# Patient Record
Sex: FEMALE | Race: WHITE | Employment: OTHER | URBAN - METROPOLITAN AREA
[De-identification: names, ages, dates, MRNs, and addresses within clinical notes are randomized per-mention and may not be internally consistent; named-entity substitution may affect disease eponyms.]

---

## 2017-01-11 ENCOUNTER — HOSPITAL ENCOUNTER (OUTPATIENT)
Dept: CARDIOLOGY | Age: 72
Discharge: HOME | End: 2017-01-11
Payer: MEDICARE

## 2017-01-11 PROCEDURE — 93299 HC INTERROG REMOTE LOOP RECORDER: CPT

## 2017-03-09 ENCOUNTER — HOSPITAL ENCOUNTER (EMERGENCY)
Age: 72
Discharge: HOME | End: 2017-03-09
Payer: MEDICARE

## 2017-03-09 VITALS
HEART RATE: 64 BPM | DIASTOLIC BLOOD PRESSURE: 89 MMHG | TEMPERATURE: 98.2 F | OXYGEN SATURATION: 96 % | SYSTOLIC BLOOD PRESSURE: 175 MMHG | RESPIRATION RATE: 18 BRPM

## 2017-03-09 DIAGNOSIS — T78.3XXA ANGIOEDEMA, INITIAL ENCOUNTER: ICD-10-CM

## 2017-03-09 DIAGNOSIS — T78.40XA ALLERGIC REACTION, INITIAL ENCOUNTER: Primary | ICD-10-CM

## 2017-03-09 PROCEDURE — 96375 TX/PRO/DX INJ NEW DRUG ADDON: CPT

## 2017-03-09 PROCEDURE — 96374 THER/PROPH/DIAG INJ IV PUSH: CPT

## 2017-03-09 PROCEDURE — 6360000002 HC RX W HCPCS: Performed by: PHYSICIAN ASSISTANT

## 2017-03-09 PROCEDURE — 96376 TX/PRO/DX INJ SAME DRUG ADON: CPT

## 2017-03-09 PROCEDURE — 99282 EMERGENCY DEPT VISIT SF MDM: CPT

## 2017-03-09 PROCEDURE — 2500000003 HC RX 250 WO HCPCS: Performed by: PHYSICIAN ASSISTANT

## 2017-03-09 PROCEDURE — S0028 INJECTION, FAMOTIDINE, 20 MG: HCPCS | Performed by: PHYSICIAN ASSISTANT

## 2017-03-09 RX ORDER — PREDNISONE 20 MG/1
50 TABLET ORAL DAILY
Qty: 5 TABLET | Refills: 0 | Status: SHIPPED | OUTPATIENT
Start: 2017-03-09 | End: 2017-03-14

## 2017-03-09 RX ORDER — DIPHENHYDRAMINE HYDROCHLORIDE 50 MG/ML
25 INJECTION INTRAMUSCULAR; INTRAVENOUS ONCE
Status: COMPLETED | OUTPATIENT
Start: 2017-03-09 | End: 2017-03-09

## 2017-03-09 RX ORDER — METHYLPREDNISOLONE SODIUM SUCCINATE 125 MG/2ML
125 INJECTION, POWDER, LYOPHILIZED, FOR SOLUTION INTRAMUSCULAR; INTRAVENOUS ONCE
Status: COMPLETED | OUTPATIENT
Start: 2017-03-09 | End: 2017-03-09

## 2017-03-09 RX ORDER — LORATADINE 10 MG/1
10 TABLET ORAL DAILY
Qty: 5 TABLET | Refills: 0 | Status: SHIPPED | OUTPATIENT
Start: 2017-03-09 | End: 2017-03-14

## 2017-03-09 RX ADMIN — FAMOTIDINE 20 MG: 10 INJECTION, SOLUTION INTRAVENOUS at 10:21

## 2017-03-09 RX ADMIN — METHYLPREDNISOLONE SODIUM SUCCINATE 125 MG: 125 INJECTION, POWDER, FOR SOLUTION INTRAMUSCULAR; INTRAVENOUS at 11:46

## 2017-03-09 RX ADMIN — DIPHENHYDRAMINE HYDROCHLORIDE 25 MG: 50 INJECTION, SOLUTION INTRAMUSCULAR; INTRAVENOUS at 11:49

## 2017-03-09 RX ADMIN — DIPHENHYDRAMINE HYDROCHLORIDE 25 MG: 50 INJECTION, SOLUTION INTRAMUSCULAR; INTRAVENOUS at 10:22

## 2017-03-09 RX ADMIN — METHYLPREDNISOLONE SODIUM SUCCINATE 125 MG: 125 INJECTION, POWDER, FOR SOLUTION INTRAMUSCULAR; INTRAVENOUS at 10:21

## 2017-03-09 ASSESSMENT — ENCOUNTER SYMPTOMS
DIARRHEA: 0
WHEEZING: 0
CHEST TIGHTNESS: 0
STRIDOR: 0
ABDOMINAL DISTENTION: 0
RHINORRHEA: 0
SHORTNESS OF BREATH: 0
COUGH: 0
NAUSEA: 0
BACK PAIN: 0
SORE THROAT: 0
CONSTIPATION: 0
COLOR CHANGE: 0
EYE DISCHARGE: 0
ABDOMINAL PAIN: 0
CHOKING: 0
VOMITING: 0

## 2017-03-27 ENCOUNTER — HOSPITAL ENCOUNTER (EMERGENCY)
Age: 72
Discharge: HOME | End: 2017-03-28
Attending: EMERGENCY MEDICINE
Payer: MEDICARE

## 2017-03-27 DIAGNOSIS — T78.3XXD ANGIOEDEMA, SUBSEQUENT ENCOUNTER: Primary | ICD-10-CM

## 2017-03-27 PROCEDURE — 6360000002 HC RX W HCPCS: Performed by: EMERGENCY MEDICINE

## 2017-03-27 PROCEDURE — 99282 EMERGENCY DEPT VISIT SF MDM: CPT

## 2017-03-27 PROCEDURE — S0028 INJECTION, FAMOTIDINE, 20 MG: HCPCS | Performed by: EMERGENCY MEDICINE

## 2017-03-27 PROCEDURE — 2500000003 HC RX 250 WO HCPCS: Performed by: EMERGENCY MEDICINE

## 2017-03-27 RX ORDER — DIPHENHYDRAMINE HYDROCHLORIDE 50 MG/ML
25 INJECTION INTRAMUSCULAR; INTRAVENOUS ONCE
Status: COMPLETED | OUTPATIENT
Start: 2017-03-27 | End: 2017-03-27

## 2017-03-27 RX ORDER — METHYLPREDNISOLONE SODIUM SUCCINATE 125 MG/2ML
125 INJECTION, POWDER, LYOPHILIZED, FOR SOLUTION INTRAMUSCULAR; INTRAVENOUS ONCE
Status: COMPLETED | OUTPATIENT
Start: 2017-03-27 | End: 2017-03-27

## 2017-03-27 RX ADMIN — DIPHENHYDRAMINE HYDROCHLORIDE 25 MG: 50 INJECTION, SOLUTION INTRAMUSCULAR; INTRAVENOUS at 22:47

## 2017-03-27 RX ADMIN — FAMOTIDINE 20 MG: 10 INJECTION INTRAVENOUS at 22:47

## 2017-03-27 RX ADMIN — METHYLPREDNISOLONE SODIUM SUCCINATE 125 MG: 125 INJECTION, POWDER, FOR SOLUTION INTRAMUSCULAR; INTRAVENOUS at 22:47

## 2017-03-27 ASSESSMENT — ENCOUNTER SYMPTOMS
EYE DISCHARGE: 0
RHINORRHEA: 0
COUGH: 0
PHOTOPHOBIA: 0
CHEST TIGHTNESS: 0
WHEEZING: 0
SHORTNESS OF BREATH: 0
TROUBLE SWALLOWING: 1
ABDOMINAL DISTENTION: 0
ABDOMINAL PAIN: 0
VOMITING: 0
SORE THROAT: 0

## 2017-03-28 ENCOUNTER — OFFICE VISIT (OUTPATIENT)
Dept: FAMILY MEDICINE CLINIC | Age: 72
End: 2017-03-28

## 2017-03-28 VITALS
HEIGHT: 65 IN | DIASTOLIC BLOOD PRESSURE: 84 MMHG | RESPIRATION RATE: 23 BRPM | OXYGEN SATURATION: 97 % | WEIGHT: 222.2 LBS | HEART RATE: 78 BPM | SYSTOLIC BLOOD PRESSURE: 144 MMHG | BODY MASS INDEX: 37.02 KG/M2 | TEMPERATURE: 98.2 F

## 2017-03-28 VITALS
TEMPERATURE: 98.6 F | OXYGEN SATURATION: 99 % | SYSTOLIC BLOOD PRESSURE: 149 MMHG | DIASTOLIC BLOOD PRESSURE: 71 MMHG | RESPIRATION RATE: 16 BRPM | HEART RATE: 78 BPM

## 2017-03-28 DIAGNOSIS — T78.3XXA ANGIOEDEMA, INITIAL ENCOUNTER: Primary | ICD-10-CM

## 2017-03-28 DIAGNOSIS — L50.9 URTICARIA: ICD-10-CM

## 2017-03-28 PROCEDURE — 96374 THER/PROPH/DIAG INJ IV PUSH: CPT

## 2017-03-28 PROCEDURE — 3014F SCREEN MAMMO DOC REV: CPT | Performed by: FAMILY MEDICINE

## 2017-03-28 PROCEDURE — G8427 DOCREV CUR MEDS BY ELIG CLIN: HCPCS | Performed by: FAMILY MEDICINE

## 2017-03-28 PROCEDURE — 4040F PNEUMOC VAC/ADMIN/RCVD: CPT | Performed by: FAMILY MEDICINE

## 2017-03-28 PROCEDURE — 1090F PRES/ABSN URINE INCON ASSESS: CPT | Performed by: FAMILY MEDICINE

## 2017-03-28 PROCEDURE — 1036F TOBACCO NON-USER: CPT | Performed by: FAMILY MEDICINE

## 2017-03-28 PROCEDURE — 3017F COLORECTAL CA SCREEN DOC REV: CPT | Performed by: FAMILY MEDICINE

## 2017-03-28 PROCEDURE — 96375 TX/PRO/DX INJ NEW DRUG ADDON: CPT

## 2017-03-28 PROCEDURE — G8417 CALC BMI ABV UP PARAM F/U: HCPCS | Performed by: FAMILY MEDICINE

## 2017-03-28 PROCEDURE — G8399 PT W/DXA RESULTS DOCUMENT: HCPCS | Performed by: FAMILY MEDICINE

## 2017-03-28 PROCEDURE — G8484 FLU IMMUNIZE NO ADMIN: HCPCS | Performed by: FAMILY MEDICINE

## 2017-03-28 PROCEDURE — 99213 OFFICE O/P EST LOW 20 MIN: CPT | Performed by: FAMILY MEDICINE

## 2017-03-28 PROCEDURE — 1123F ACP DISCUSS/DSCN MKR DOCD: CPT | Performed by: FAMILY MEDICINE

## 2017-03-28 RX ORDER — METHYLPREDNISOLONE 4 MG/1
4 TABLET ORAL SEE ADMIN INSTRUCTIONS
COMMUNITY
End: 2017-06-01

## 2017-03-28 RX ORDER — EPINEPHRINE 0.3 MG/.3ML
0.3 INJECTION SUBCUTANEOUS ONCE
Qty: 0.3 ML | Refills: 0 | Status: SHIPPED | OUTPATIENT
Start: 2017-03-28 | End: 2018-04-27 | Stop reason: SDUPTHER

## 2017-03-28 RX ORDER — METHYLPREDNISOLONE 4 MG/1
TABLET ORAL
Qty: 1 KIT | Refills: 0 | Status: SHIPPED | OUTPATIENT
Start: 2017-03-28 | End: 2017-03-28

## 2017-03-28 ASSESSMENT — ENCOUNTER SYMPTOMS
ABDOMINAL PAIN: 0
SHORTNESS OF BREATH: 0
NAUSEA: 0
VOMITING: 0

## 2017-04-13 ENCOUNTER — HOSPITAL ENCOUNTER (OUTPATIENT)
Dept: CARDIOLOGY | Age: 72
Discharge: HOME | End: 2017-04-13
Payer: MEDICARE

## 2017-04-13 PROCEDURE — 93299 HC INTERROG REMOTE LOOP RECORDER: CPT

## 2017-04-24 RX ORDER — DOXAZOSIN MESYLATE 4 MG/1
TABLET ORAL
Qty: 90 TABLET | Refills: 0 | Status: SHIPPED | OUTPATIENT
Start: 2017-04-24 | End: 2017-07-27 | Stop reason: SDUPTHER

## 2017-05-01 RX ORDER — SERTRALINE HYDROCHLORIDE 100 MG/1
TABLET, FILM COATED ORAL
Qty: 90 TABLET | Refills: 0 | Status: SHIPPED | OUTPATIENT
Start: 2017-05-01 | End: 2017-07-07 | Stop reason: SDUPTHER

## 2017-06-01 ENCOUNTER — OFFICE VISIT (OUTPATIENT)
Dept: FAMILY MEDICINE CLINIC | Age: 72
End: 2017-06-01

## 2017-06-01 VITALS
SYSTOLIC BLOOD PRESSURE: 138 MMHG | DIASTOLIC BLOOD PRESSURE: 84 MMHG | HEIGHT: 65 IN | OXYGEN SATURATION: 96 % | RESPIRATION RATE: 16 BRPM | HEART RATE: 74 BPM | TEMPERATURE: 98.9 F | BODY MASS INDEX: 36.99 KG/M2 | WEIGHT: 222 LBS

## 2017-06-01 DIAGNOSIS — M46.1 SACROILIAC INFLAMMATION (HCC): ICD-10-CM

## 2017-06-01 DIAGNOSIS — I10 ESSENTIAL HYPERTENSION: ICD-10-CM

## 2017-06-01 DIAGNOSIS — J45.41 RAD (REACTIVE AIRWAY DISEASE), MODERATE PERSISTENT, WITH ACUTE EXACERBATION: Primary | ICD-10-CM

## 2017-06-01 PROCEDURE — G8399 PT W/DXA RESULTS DOCUMENT: HCPCS | Performed by: FAMILY MEDICINE

## 2017-06-01 PROCEDURE — 3014F SCREEN MAMMO DOC REV: CPT | Performed by: FAMILY MEDICINE

## 2017-06-01 PROCEDURE — 1090F PRES/ABSN URINE INCON ASSESS: CPT | Performed by: FAMILY MEDICINE

## 2017-06-01 PROCEDURE — 1123F ACP DISCUSS/DSCN MKR DOCD: CPT | Performed by: FAMILY MEDICINE

## 2017-06-01 PROCEDURE — 4040F PNEUMOC VAC/ADMIN/RCVD: CPT | Performed by: FAMILY MEDICINE

## 2017-06-01 PROCEDURE — G8417 CALC BMI ABV UP PARAM F/U: HCPCS | Performed by: FAMILY MEDICINE

## 2017-06-01 PROCEDURE — G8427 DOCREV CUR MEDS BY ELIG CLIN: HCPCS | Performed by: FAMILY MEDICINE

## 2017-06-01 PROCEDURE — 99213 OFFICE O/P EST LOW 20 MIN: CPT | Performed by: FAMILY MEDICINE

## 2017-06-01 PROCEDURE — 1036F TOBACCO NON-USER: CPT | Performed by: FAMILY MEDICINE

## 2017-06-01 PROCEDURE — 3017F COLORECTAL CA SCREEN DOC REV: CPT | Performed by: FAMILY MEDICINE

## 2017-06-01 RX ORDER — CEFUROXIME AXETIL 250 MG/1
250 TABLET ORAL 2 TIMES DAILY
Qty: 20 TABLET | Refills: 0 | Status: SHIPPED | OUTPATIENT
Start: 2017-06-01 | End: 2017-06-11

## 2017-06-01 RX ORDER — PREDNISONE 10 MG/1
TABLET ORAL
Qty: 30 TABLET | Refills: 0 | Status: SHIPPED | OUTPATIENT
Start: 2017-06-01 | End: 2017-12-12 | Stop reason: ALTCHOICE

## 2017-07-07 RX ORDER — SERTRALINE HYDROCHLORIDE 100 MG/1
TABLET, FILM COATED ORAL
Qty: 90 TABLET | Refills: 0 | Status: SHIPPED | OUTPATIENT
Start: 2017-07-07 | End: 2017-07-27 | Stop reason: SDUPTHER

## 2017-07-24 ENCOUNTER — HOSPITAL ENCOUNTER (OUTPATIENT)
Dept: CARDIOLOGY | Age: 72
Discharge: HOME | End: 2017-07-24
Payer: MEDICARE

## 2017-07-24 PROCEDURE — 93299 HC INTERROG REMOTE LOOP RECORDER: CPT

## 2017-07-28 RX ORDER — DOXAZOSIN MESYLATE 4 MG/1
TABLET ORAL
Qty: 90 TABLET | Refills: 0 | Status: SHIPPED | OUTPATIENT
Start: 2017-07-28 | End: 2017-11-16 | Stop reason: SDUPTHER

## 2017-07-28 RX ORDER — SERTRALINE HYDROCHLORIDE 100 MG/1
TABLET, FILM COATED ORAL
Qty: 90 TABLET | Refills: 0 | Status: SHIPPED | OUTPATIENT
Start: 2017-07-28 | End: 2017-12-12 | Stop reason: SDUPTHER

## 2017-07-28 RX ORDER — CARVEDILOL 25 MG/1
TABLET ORAL
Qty: 180 TABLET | Refills: 1 | Status: SHIPPED | OUTPATIENT
Start: 2017-07-28 | End: 2017-11-16 | Stop reason: SDUPTHER

## 2017-10-24 ENCOUNTER — HOSPITAL ENCOUNTER (OUTPATIENT)
Dept: CARDIOLOGY | Age: 72
Discharge: HOME | End: 2017-10-24
Payer: MEDICARE

## 2017-10-24 PROCEDURE — 93299 HC INTERROG REMOTE LOOP RECORDER: CPT

## 2017-11-16 RX ORDER — DOXAZOSIN MESYLATE 4 MG/1
TABLET ORAL
Qty: 15 TABLET | Refills: 0 | Status: SHIPPED | OUTPATIENT
Start: 2017-11-16 | End: 2017-12-12 | Stop reason: SDUPTHER

## 2017-11-16 RX ORDER — CARVEDILOL 25 MG/1
TABLET ORAL
Qty: 30 TABLET | Refills: 0 | Status: SHIPPED | OUTPATIENT
Start: 2017-11-16 | End: 2017-12-12 | Stop reason: SDUPTHER

## 2017-12-04 ENCOUNTER — CARE COORDINATION (OUTPATIENT)
Dept: CARE COORDINATION | Age: 72
End: 2017-12-04

## 2017-12-04 NOTE — LETTER
12/4/2017              Chris Butts,[address]      [facility] have been selected by your primary care provider to participate in a Care Coordination Program that provides additional support and resources to provide a higher level of care to our patients. One of those resources is a Nurse Care Coordinator, Andrew Tapia who can offer support in managing the health of our patients who have chronic health conditions, multiple health conditions, and or complex health needs. Examples of the types of support Andrew Tapia RN will provide include service coordination (finding providers in your network and community, assistance in scheduling services, ensuring test results are available, etc), education, and promoting your ability to follow your doctor's recommended treatment plan. You have been selected to take part in this unique program that will include one on one interaction with Andrew Tapia RN. Andrew Tapia RN will work with you following some of your appointments with me in our office. She/He will also contact you via phone to help you learn and understand how to manage your health and work towards your chosen health goals. I hope that you will be as excited by this program as we are. Andrew Tapia RN will be contacting you in the next week to speak with you regarding your plan of care.     Sincerely,     Renaee Collet, MD

## 2017-12-04 NOTE — CARE COORDINATION
Ambulatory Care Coordination Note  12/4/2017  CM Risk Score: 5  Beck Mortality Risk Score: 5.58    ACC: Guerline Astudillo, MELE    Summary Note: Mailed out [facility] intro letter to follow up on previous MyChart contact. Prior to Admission medications    Medication Sig Start Date End Date Taking? Authorizing Provider   carvedilol (COREG) 25 MG tablet Take 1 tablet by mouth two  times daily with meals 11/16/17   Davidson Dozier MD   doxazosin (CARDURA) 4 MG tablet Take 1 tablet by mouth  nightly 11/16/17   Davidson Dozier MD   HYDROcodone-acetaminophen (NORCO) 5-325 MG per tablet Take 1 tablet by mouth 2 times daily as needed for Pain . 9/5/17 9/20/17  Adrienne Garcia MD   tiZANidine (ZANAFLEX) 4 MG tablet Take 1 tablet by mouth 2 times daily 9/5/17   Adrienne Garcia MD   sertraline (ZOLOFT) 100 MG tablet Take 1 tablet by mouth  daily 7/28/17   Davidson Dozier MD   ADVAIR DISKUS 500-50 MCG/DOSE diskus inhaler Use 1 puff every 12 hours 7/3/17   Davidson Dozier MD   predniSONE (DELTASONE) 10 MG tablet 40mg daily x 4d, 30mg daily x 3d, 20mg x 2d, 10mg x 1d, then d/c 6/1/17   Davidson Dozier MD   lidocaine (XYLOCAINE) 5 % ointment Apply topically as needed.  5/1/17   Adrienne Garcia MD   EPINEPHrine (EPIPEN 2-DE) 0.3 MG/0.3ML SOAJ injection Inject 0.3 mLs into the muscle once for 1 dose Use as directed for allergic reaction 3/28/17 3/28/17  Brenda Rondon MD   Multiple Vitamins-Minerals (MULTIVITAMIN PO) Take by mouth    Historical Provider, MD   Respiratory Therapy Supplies (NEBULIZER AIR TUBE/PLUGS) MISC As directed 3/7/16   Davidson Dozier MD   albuterol sulfate HFA (PROAIR HFA) 108 (90 BASE) MCG/ACT inhaler Inhale 2 puffs into the lungs every 6 hours as needed for Wheezing 1/25/16   Davidson Dozier MD   aspirin 325 MG EC tablet Take 325 mg by mouth every 6 hours as needed for Pain    Historical Provider, MD       Future Appointments  Date Time Provider [place]   12/12/2017 2:00

## 2017-12-12 ENCOUNTER — OFFICE VISIT (OUTPATIENT)
Dept: FAMILY MEDICINE CLINIC | Age: 72
End: 2017-12-12

## 2017-12-12 VITALS
WEIGHT: 218.9 LBS | OXYGEN SATURATION: 96 % | SYSTOLIC BLOOD PRESSURE: 136 MMHG | RESPIRATION RATE: 16 BRPM | DIASTOLIC BLOOD PRESSURE: 82 MMHG | BODY MASS INDEX: 36.47 KG/M2 | TEMPERATURE: 96.9 F | HEART RATE: 65 BPM | HEIGHT: 65 IN

## 2017-12-12 DIAGNOSIS — F32.A ANXIETY AND DEPRESSION: ICD-10-CM

## 2017-12-12 DIAGNOSIS — E78.00 PURE HYPERCHOLESTEROLEMIA: ICD-10-CM

## 2017-12-12 DIAGNOSIS — M17.11 PRIMARY OSTEOARTHRITIS OF RIGHT KNEE: Primary | ICD-10-CM

## 2017-12-12 DIAGNOSIS — I10 ESSENTIAL HYPERTENSION: ICD-10-CM

## 2017-12-12 DIAGNOSIS — F41.9 ANXIETY AND DEPRESSION: ICD-10-CM

## 2017-12-12 PROCEDURE — 99214 OFFICE O/P EST MOD 30 MIN: CPT | Performed by: FAMILY MEDICINE

## 2017-12-12 PROCEDURE — G8399 PT W/DXA RESULTS DOCUMENT: HCPCS | Performed by: FAMILY MEDICINE

## 2017-12-12 PROCEDURE — 1090F PRES/ABSN URINE INCON ASSESS: CPT | Performed by: FAMILY MEDICINE

## 2017-12-12 PROCEDURE — G8427 DOCREV CUR MEDS BY ELIG CLIN: HCPCS | Performed by: FAMILY MEDICINE

## 2017-12-12 PROCEDURE — 3014F SCREEN MAMMO DOC REV: CPT | Performed by: FAMILY MEDICINE

## 2017-12-12 PROCEDURE — G8484 FLU IMMUNIZE NO ADMIN: HCPCS | Performed by: FAMILY MEDICINE

## 2017-12-12 PROCEDURE — 1123F ACP DISCUSS/DSCN MKR DOCD: CPT | Performed by: FAMILY MEDICINE

## 2017-12-12 PROCEDURE — 4040F PNEUMOC VAC/ADMIN/RCVD: CPT | Performed by: FAMILY MEDICINE

## 2017-12-12 PROCEDURE — 1036F TOBACCO NON-USER: CPT | Performed by: FAMILY MEDICINE

## 2017-12-12 PROCEDURE — G8417 CALC BMI ABV UP PARAM F/U: HCPCS | Performed by: FAMILY MEDICINE

## 2017-12-12 PROCEDURE — 3017F COLORECTAL CA SCREEN DOC REV: CPT | Performed by: FAMILY MEDICINE

## 2017-12-12 RX ORDER — CARVEDILOL 25 MG/1
TABLET ORAL
Qty: 90 TABLET | Refills: 1 | Status: SHIPPED | OUTPATIENT
Start: 2017-12-12 | End: 2018-03-20 | Stop reason: SDUPTHER

## 2017-12-12 RX ORDER — DOXAZOSIN MESYLATE 4 MG/1
TABLET ORAL
Qty: 90 TABLET | Refills: 1 | Status: SHIPPED | OUTPATIENT
Start: 2017-12-12 | End: 2018-05-09 | Stop reason: SDUPTHER

## 2017-12-12 RX ORDER — SERTRALINE HYDROCHLORIDE 100 MG/1
TABLET, FILM COATED ORAL
Qty: 90 TABLET | Refills: 1 | Status: SHIPPED | OUTPATIENT
Start: 2017-12-12 | End: 2018-05-09 | Stop reason: SDUPTHER

## 2017-12-12 RX ORDER — SERTRALINE HYDROCHLORIDE 100 MG/1
TABLET, FILM COATED ORAL
Qty: 90 TABLET | Refills: 0 | Status: SHIPPED | OUTPATIENT
Start: 2017-12-12 | End: 2017-12-12 | Stop reason: SDUPTHER

## 2017-12-12 RX ORDER — CELECOXIB 200 MG/1
200 CAPSULE ORAL DAILY
Qty: 30 CAPSULE | Refills: 0 | Status: SHIPPED | OUTPATIENT
Start: 2017-12-12 | End: 2018-01-05

## 2017-12-12 ASSESSMENT — ENCOUNTER SYMPTOMS: ABDOMINAL PAIN: 0

## 2017-12-13 ENCOUNTER — CARE COORDINATION (OUTPATIENT)
Dept: CARE COORDINATION | Age: 72
End: 2017-12-13

## 2017-12-20 ENCOUNTER — OFFICE VISIT (OUTPATIENT)
Dept: FAMILY MEDICINE CLINIC | Age: 72
End: 2017-12-20

## 2017-12-20 VITALS
HEIGHT: 65 IN | OXYGEN SATURATION: 97 % | RESPIRATION RATE: 18 BRPM | DIASTOLIC BLOOD PRESSURE: 82 MMHG | BODY MASS INDEX: 36.65 KG/M2 | WEIGHT: 220 LBS | HEART RATE: 74 BPM | SYSTOLIC BLOOD PRESSURE: 132 MMHG | TEMPERATURE: 98.2 F

## 2017-12-20 DIAGNOSIS — J21.9 ACUTE BRONCHIOLITIS DUE TO UNSPECIFIED ORGANISM: Primary | ICD-10-CM

## 2017-12-20 DIAGNOSIS — J45.41 MODERATE PERSISTENT REACTIVE AIRWAY DISEASE WITH ACUTE EXACERBATION: ICD-10-CM

## 2017-12-20 PROCEDURE — 1090F PRES/ABSN URINE INCON ASSESS: CPT | Performed by: FAMILY MEDICINE

## 2017-12-20 PROCEDURE — 4040F PNEUMOC VAC/ADMIN/RCVD: CPT | Performed by: FAMILY MEDICINE

## 2017-12-20 PROCEDURE — G8417 CALC BMI ABV UP PARAM F/U: HCPCS | Performed by: FAMILY MEDICINE

## 2017-12-20 PROCEDURE — 1036F TOBACCO NON-USER: CPT | Performed by: FAMILY MEDICINE

## 2017-12-20 PROCEDURE — 3017F COLORECTAL CA SCREEN DOC REV: CPT | Performed by: FAMILY MEDICINE

## 2017-12-20 PROCEDURE — G8399 PT W/DXA RESULTS DOCUMENT: HCPCS | Performed by: FAMILY MEDICINE

## 2017-12-20 PROCEDURE — G8427 DOCREV CUR MEDS BY ELIG CLIN: HCPCS | Performed by: FAMILY MEDICINE

## 2017-12-20 PROCEDURE — G8484 FLU IMMUNIZE NO ADMIN: HCPCS | Performed by: FAMILY MEDICINE

## 2017-12-20 PROCEDURE — 1123F ACP DISCUSS/DSCN MKR DOCD: CPT | Performed by: FAMILY MEDICINE

## 2017-12-20 PROCEDURE — 3014F SCREEN MAMMO DOC REV: CPT | Performed by: FAMILY MEDICINE

## 2017-12-20 PROCEDURE — 99213 OFFICE O/P EST LOW 20 MIN: CPT | Performed by: FAMILY MEDICINE

## 2017-12-20 RX ORDER — CEFDINIR 300 MG/1
300 CAPSULE ORAL 2 TIMES DAILY
Qty: 20 CAPSULE | Refills: 0 | Status: SHIPPED | OUTPATIENT
Start: 2017-12-20 | End: 2017-12-30

## 2017-12-20 RX ORDER — PREDNISONE 10 MG/1
TABLET ORAL
Qty: 40 TABLET | Refills: 0 | Status: SHIPPED | OUTPATIENT
Start: 2017-12-20 | End: 2018-01-19 | Stop reason: ALTCHOICE

## 2017-12-20 ASSESSMENT — ENCOUNTER SYMPTOMS
CHEST TIGHTNESS: 0
COUGH: 1
GASTROINTESTINAL NEGATIVE: 1
EYES NEGATIVE: 1
RHINORRHEA: 0
SORE THROAT: 1

## 2017-12-20 NOTE — PROGRESS NOTES
Patient is seen in follow up for   Chief Complaint   Patient presents with    Cough     x 4 days     Chest Congestion     Cough   This is a chronic problem. The current episode started in the past 7 days. The problem has been waxing and waning. The problem occurs constantly. The cough is productive of sputum. Associated symptoms include a sore throat. Pertinent negatives include no fever or rhinorrhea. She has tried nothing for the symptoms. The treatment provided no relief.        Past Medical History:   Diagnosis Date    Hyperlipidemia     Hypertension     Stroke (cerebrum) ([facility] [address] 08/01/2015    TIA (transient ischemic attack)      Patient Active Problem List    Diagnosis Date Noted    Sacroiliac inflammation ([facility] [address] 08/23/2016    Spondylosis of lumbosacral joint without myelopathy 06/14/2016    RAD (reactive airway disease) 06/19/2014    Hypertension     Hyperlipidemia      Past Surgical History:   Procedure Laterality Date    APPENDECTOMY      CATARACT REMOVAL      HAND SURGERY      RIGHT    TONSILLECTOMY AND ADENOIDECTOMY       Family History   Problem Relation Age of Onset    Cancer Mother      BREAST, COLON    High Blood Pressure Father      Social History     Social History    Marital status:      Spouse name: N/A    Number of children: N/A    Years of education: N/A     Social History Main Topics    Smoking status: Never Smoker    Smokeless tobacco: Never Used    Alcohol use No    Drug use: No    Sexual activity: Not Asked     Other Topics Concern    None     Social History Narrative    ** Merged History Encounter **          Current Outpatient Prescriptions   Medication Sig Dispense Refill    predniSONE (DELTASONE) 10 MG tablet 4 po for 4 days 3 po for 4 days 2 po for 4 days 1 po for 4 days 40 tablet 0    cefdinir (OMNICEF) 300 MG capsule Take 1 capsule by mouth 2 times daily for 10 days 20 capsule 0    sertraline (ZOLOFT) 100 MG tablet Take 1 tablet by mouth  daily 90 tablet 1    carvedilol (COREG) 25 MG tablet Take 1 tablet by mouth two  times daily with meals 90 tablet 1    doxazosin (CARDURA) 4 MG tablet Take 1 tablet by mouth  nightly 90 tablet 1    celecoxib (CELEBREX) 200 MG capsule Take 1 capsule by mouth daily 30 capsule 0    tiZANidine (ZANAFLEX) 4 MG tablet Take 1 tablet by mouth 2 times daily 60 tablet 2    ADVAIR DISKUS 500-50 MCG/DOSE diskus inhaler Use 1 puff every 12 hours 120 each 0    lidocaine (XYLOCAINE) 5 % ointment Apply topically as needed. 2 Tube 0    Multiple Vitamins-Minerals (MULTIVITAMIN PO) Take by mouth      Respiratory Therapy Supplies (NEBULIZER AIR TUBE/PLUGS) MISC As directed 1 each 1    albuterol sulfate HFA (PROAIR HFA) 108 (90 BASE) MCG/ACT inhaler Inhale 2 puffs into the lungs every 6 hours as needed for Wheezing 1 Inhaler 3    aspirin 325 MG EC tablet Take 325 mg by mouth every 6 hours as needed for Pain      HYDROcodone-acetaminophen (NORCO) 5-325 MG per tablet Take 1 tablet by mouth 2 times daily as needed for Pain . 30 tablet 0    EPINEPHrine (EPIPEN 2-DE) 0.3 MG/0.3ML SOAJ injection Inject 0.3 mLs into the muscle once for 1 dose Use as directed for allergic reaction 0.3 mL 0     No current facility-administered medications for this visit. Current Outpatient Prescriptions on File Prior to Visit   Medication Sig Dispense Refill    sertraline (ZOLOFT) 100 MG tablet Take 1 tablet by mouth  daily 90 tablet 1    carvedilol (COREG) 25 MG tablet Take 1 tablet by mouth two  times daily with meals 90 tablet 1    doxazosin (CARDURA) 4 MG tablet Take 1 tablet by mouth  nightly 90 tablet 1    celecoxib (CELEBREX) 200 MG capsule Take 1 capsule by mouth daily 30 capsule 0    tiZANidine (ZANAFLEX) 4 MG tablet Take 1 tablet by mouth 2 times daily 60 tablet 2    ADVAIR DISKUS 500-50 MCG/DOSE diskus inhaler Use 1 puff every 12 hours 120 each 0    lidocaine (XYLOCAINE) 5 % ointment Apply topically as needed.  2 Tube 0    Multiple Vitamins-Minerals (MULTIVITAMIN PO) Take by mouth      Respiratory Therapy Supplies (NEBULIZER AIR TUBE/PLUGS) MISC As directed 1 each 1    albuterol sulfate HFA (PROAIR HFA) 108 (90 BASE) MCG/ACT inhaler Inhale 2 puffs into the lungs every 6 hours as needed for Wheezing 1 Inhaler 3    aspirin 325 MG EC tablet Take 325 mg by mouth every 6 hours as needed for Pain      HYDROcodone-acetaminophen (NORCO) 5-325 MG per tablet Take 1 tablet by mouth 2 times daily as needed for Pain . 30 tablet 0    EPINEPHrine (EPIPEN 2-DE) 0.3 MG/0.3ML SOAJ injection Inject 0.3 mLs into the muscle once for 1 dose Use as directed for allergic reaction 0.3 mL 0     No current facility-administered medications on file prior to visit. Allergies   Allergen Reactions    Praluent [Alirocumab] Anaphylaxis    Questran [Cholestyramine] Anaphylaxis and Swelling    Floxin [Ofloxacin]     Lidocaine Oint & Men-Meth Jemal Swelling    Lovaza [Omega-3-Acid Ethyl Esters]     Niacin And Related     Statins     Tricor [Fenofibrate]      Health Maintenance   Topic Date Due    DTaP/Tdap/Td vaccine (1 - Tdap) 04/13/1964    Pneumococcal low/med risk (2 of 2 - PPSV23) 10/28/2017    Breast cancer screen  06/15/2018    Lipid screen  03/05/2021    Colon cancer screen colonoscopy  03/19/2023    Zostavax vaccine  Completed    DEXA (modify frequency per FRAX score)  Completed    Flu vaccine  Completed       Review of Systems    Review of Systems   Constitutional: Negative for activity change, appetite change, fatigue and fever. HENT: Positive for congestion and sore throat. Negative for rhinorrhea. Eyes: Negative. Respiratory: Positive for cough. Negative for chest tightness. Cardiovascular: Negative. Gastrointestinal: Negative. Endocrine: Negative. Genitourinary: Negative. Musculoskeletal: Negative. Skin: Negative. Neurological: Negative for dizziness, light-headedness and numbness. Hematological: Negative.

## 2018-01-04 ENCOUNTER — TELEPHONE (OUTPATIENT)
Dept: FAMILY MEDICINE CLINIC | Age: 73
End: 2018-01-04

## 2018-01-04 NOTE — TELEPHONE ENCOUNTER
Pt finished with all meds, steroids and such and is still having terrible wheezing, wants to know what you advise now. Please let pt know.

## 2018-01-05 ENCOUNTER — OFFICE VISIT (OUTPATIENT)
Dept: FAMILY MEDICINE CLINIC | Age: 73
End: 2018-01-05

## 2018-01-05 VITALS
OXYGEN SATURATION: 96 % | RESPIRATION RATE: 18 BRPM | DIASTOLIC BLOOD PRESSURE: 80 MMHG | HEART RATE: 76 BPM | TEMPERATURE: 98.6 F | SYSTOLIC BLOOD PRESSURE: 130 MMHG

## 2018-01-05 DIAGNOSIS — J32.9 CHRONIC SINUSITIS, UNSPECIFIED LOCATION: ICD-10-CM

## 2018-01-05 DIAGNOSIS — J45.41 MODERATE PERSISTENT REACTIVE AIRWAY DISEASE WITH ACUTE EXACERBATION: Primary | ICD-10-CM

## 2018-01-05 PROCEDURE — G8417 CALC BMI ABV UP PARAM F/U: HCPCS | Performed by: FAMILY MEDICINE

## 2018-01-05 PROCEDURE — 1036F TOBACCO NON-USER: CPT | Performed by: FAMILY MEDICINE

## 2018-01-05 PROCEDURE — 3014F SCREEN MAMMO DOC REV: CPT | Performed by: FAMILY MEDICINE

## 2018-01-05 PROCEDURE — 99213 OFFICE O/P EST LOW 20 MIN: CPT | Performed by: FAMILY MEDICINE

## 2018-01-05 PROCEDURE — G8484 FLU IMMUNIZE NO ADMIN: HCPCS | Performed by: FAMILY MEDICINE

## 2018-01-05 PROCEDURE — G8427 DOCREV CUR MEDS BY ELIG CLIN: HCPCS | Performed by: FAMILY MEDICINE

## 2018-01-05 PROCEDURE — G8399 PT W/DXA RESULTS DOCUMENT: HCPCS | Performed by: FAMILY MEDICINE

## 2018-01-05 PROCEDURE — 3017F COLORECTAL CA SCREEN DOC REV: CPT | Performed by: FAMILY MEDICINE

## 2018-01-05 PROCEDURE — 1090F PRES/ABSN URINE INCON ASSESS: CPT | Performed by: FAMILY MEDICINE

## 2018-01-05 PROCEDURE — 1123F ACP DISCUSS/DSCN MKR DOCD: CPT | Performed by: FAMILY MEDICINE

## 2018-01-05 PROCEDURE — 4040F PNEUMOC VAC/ADMIN/RCVD: CPT | Performed by: FAMILY MEDICINE

## 2018-01-05 RX ORDER — CLARITHROMYCIN 500 MG/1
500 TABLET, COATED ORAL 2 TIMES DAILY
Qty: 20 TABLET | Refills: 0 | Status: SHIPPED | OUTPATIENT
Start: 2018-01-05 | End: 2018-01-15

## 2018-01-05 RX ORDER — PREDNISONE 10 MG/1
TABLET ORAL
Qty: 30 TABLET | Refills: 0 | Status: SHIPPED | OUTPATIENT
Start: 2018-01-05 | End: 2018-01-19 | Stop reason: ALTCHOICE

## 2018-01-05 ASSESSMENT — ENCOUNTER SYMPTOMS
ABDOMINAL PAIN: 0
VOMITING: 0
CHEST TIGHTNESS: 1

## 2018-01-05 NOTE — PROGRESS NOTES
0    tiZANidine (ZANAFLEX) 4 MG tablet Take 1 tablet by mouth 2 times daily 60 tablet 2    lidocaine (XYLOCAINE) 5 % ointment Apply topically as needed. 2 Tube 0    EPINEPHrine (EPIPEN 2-DE) 0.3 MG/0.3ML SOAJ injection Inject 0.3 mLs into the muscle once for 1 dose Use as directed for allergic reaction 0.3 mL 0    Multiple Vitamins-Minerals (MULTIVITAMIN PO) Take by mouth      Respiratory Therapy Supplies (NEBULIZER AIR TUBE/PLUGS) MISC As directed 1 each 1    albuterol sulfate HFA (PROAIR HFA) 108 (90 BASE) MCG/ACT inhaler Inhale 2 puffs into the lungs every 6 hours as needed for Wheezing 1 Inhaler 3    aspirin 325 MG EC tablet Take 325 mg by mouth every 6 hours as needed for Pain       No current facility-administered medications for this visit. Family History   Problem Relation Age of Onset    Cancer Mother      BREAST, COLON    High Blood Pressure Father      Past Medical History:   Diagnosis Date    Hyperlipidemia     Hypertension     Stroke (cerebrum) ([facility] [address] 08/01/2015    TIA (transient ischemic attack)      Objective:   /80   Pulse 76   Temp 98.6 °F (37 °C) (Tympanic)   Resp 18   SpO2 96%     Physical Exam  Heent: T.Ms normal Nares patent but mucosa swollen Mild pharyngeal injection. No                Exudate. No lip or tongue lesions. Neck: Minimal anter cervical adenopathy. No masses or thyroid asymmetry  Lungs: faint end expiratory wheeze bilaterally. No rales. Breath sounds equal  Heart: Rate reg   No murmur  Gen;:  No acute distress  Assessment:      1.  Moderate persistent reactive airway disease with acute exacerbation  XR CHEST STANDARD (2 VW)   2. Chronic sinusitis, unspecified location         Plan:    continue inhalers   Orders Placed This Encounter   Procedures    XR CHEST STANDARD (2 VW)     Standing Status:   Future     Number of Occurrences:   1     Standing Expiration Date:   1/5/2019     Orders Placed This Encounter   Medications    predniSONE ([place] [facility]) 10

## 2018-01-10 ENCOUNTER — TELEPHONE (OUTPATIENT)
Dept: FAMILY MEDICINE CLINIC | Age: 73
End: 2018-01-10

## 2018-01-19 ENCOUNTER — OFFICE VISIT (OUTPATIENT)
Dept: FAMILY MEDICINE CLINIC | Age: 73
End: 2018-01-19

## 2018-01-19 VITALS
WEIGHT: 221 LBS | SYSTOLIC BLOOD PRESSURE: 122 MMHG | BODY MASS INDEX: 36.82 KG/M2 | TEMPERATURE: 99.4 F | OXYGEN SATURATION: 94 % | RESPIRATION RATE: 17 BRPM | DIASTOLIC BLOOD PRESSURE: 68 MMHG | HEIGHT: 65 IN | HEART RATE: 72 BPM

## 2018-01-19 DIAGNOSIS — J45.41 RAD (REACTIVE AIRWAY DISEASE), MODERATE PERSISTENT, WITH ACUTE EXACERBATION: Primary | ICD-10-CM

## 2018-01-19 PROCEDURE — 3017F COLORECTAL CA SCREEN DOC REV: CPT | Performed by: FAMILY MEDICINE

## 2018-01-19 PROCEDURE — G8417 CALC BMI ABV UP PARAM F/U: HCPCS | Performed by: FAMILY MEDICINE

## 2018-01-19 PROCEDURE — 99213 OFFICE O/P EST LOW 20 MIN: CPT | Performed by: FAMILY MEDICINE

## 2018-01-19 PROCEDURE — 4040F PNEUMOC VAC/ADMIN/RCVD: CPT | Performed by: FAMILY MEDICINE

## 2018-01-19 PROCEDURE — 3014F SCREEN MAMMO DOC REV: CPT | Performed by: FAMILY MEDICINE

## 2018-01-19 PROCEDURE — G8399 PT W/DXA RESULTS DOCUMENT: HCPCS | Performed by: FAMILY MEDICINE

## 2018-01-19 PROCEDURE — G8484 FLU IMMUNIZE NO ADMIN: HCPCS | Performed by: FAMILY MEDICINE

## 2018-01-19 PROCEDURE — G8427 DOCREV CUR MEDS BY ELIG CLIN: HCPCS | Performed by: FAMILY MEDICINE

## 2018-01-19 PROCEDURE — 1090F PRES/ABSN URINE INCON ASSESS: CPT | Performed by: FAMILY MEDICINE

## 2018-01-19 PROCEDURE — 1123F ACP DISCUSS/DSCN MKR DOCD: CPT | Performed by: FAMILY MEDICINE

## 2018-01-19 PROCEDURE — 1036F TOBACCO NON-USER: CPT | Performed by: FAMILY MEDICINE

## 2018-01-19 ASSESSMENT — ENCOUNTER SYMPTOMS: ABDOMINAL PAIN: 0

## 2018-01-23 ENCOUNTER — HOSPITAL ENCOUNTER (OUTPATIENT)
Dept: CARDIOLOGY | Age: 73
Discharge: HOME | End: 2018-01-23
Payer: MEDICARE

## 2018-01-23 PROCEDURE — 93299 HC INTERROG REMOTE LOOP RECORDER: CPT

## 2018-03-20 DIAGNOSIS — I10 ESSENTIAL HYPERTENSION: ICD-10-CM

## 2018-03-20 RX ORDER — CARVEDILOL 25 MG/1
TABLET ORAL
Qty: 180 TABLET | Refills: 0 | Status: SHIPPED | OUTPATIENT
Start: 2018-03-20 | End: 2018-05-10 | Stop reason: SDUPTHER

## 2018-04-23 ENCOUNTER — HOSPITAL ENCOUNTER (OUTPATIENT)
Dept: CARDIOLOGY | Age: 73
Discharge: HOME | End: 2018-04-23
Payer: MEDICARE

## 2018-04-23 PROCEDURE — 93299 HC INTERROG REMOTE LOOP RECORDER: CPT

## 2018-04-27 ENCOUNTER — OFFICE VISIT (OUTPATIENT)
Dept: FAMILY MEDICINE CLINIC | Age: 73
End: 2018-04-27
Payer: MEDICARE

## 2018-04-27 VITALS
TEMPERATURE: 97.8 F | SYSTOLIC BLOOD PRESSURE: 122 MMHG | RESPIRATION RATE: 15 BRPM | WEIGHT: 217 LBS | HEIGHT: 65 IN | BODY MASS INDEX: 36.15 KG/M2 | HEART RATE: 61 BPM | DIASTOLIC BLOOD PRESSURE: 68 MMHG

## 2018-04-27 DIAGNOSIS — R31.9 HEMATURIA, UNSPECIFIED TYPE: ICD-10-CM

## 2018-04-27 DIAGNOSIS — R31.9 HEMATURIA, UNSPECIFIED TYPE: Primary | ICD-10-CM

## 2018-04-27 DIAGNOSIS — R22.0 TONGUE SWELLING: ICD-10-CM

## 2018-04-27 LAB
BILIRUBIN, POC: NORMAL
BLOOD URINE, POC: NORMAL
CLARITY, POC: NORMAL
COLOR, POC: NORMAL
GLUCOSE URINE, POC: NORMAL
KETONES, POC: NORMAL
LEUKOCYTE EST, POC: NORMAL
NITRITE, POC: NORMAL
PH, POC: 6
PROTEIN, POC: NORMAL
SPECIFIC GRAVITY, POC: 1.02
UROBILINOGEN, POC: NORMAL

## 2018-04-27 PROCEDURE — 81002 URINALYSIS NONAUTO W/O SCOPE: CPT | Performed by: FAMILY MEDICINE

## 2018-04-27 PROCEDURE — 1036F TOBACCO NON-USER: CPT | Performed by: FAMILY MEDICINE

## 2018-04-27 PROCEDURE — 3017F COLORECTAL CA SCREEN DOC REV: CPT | Performed by: FAMILY MEDICINE

## 2018-04-27 PROCEDURE — 99213 OFFICE O/P EST LOW 20 MIN: CPT | Performed by: FAMILY MEDICINE

## 2018-04-27 PROCEDURE — 1090F PRES/ABSN URINE INCON ASSESS: CPT | Performed by: FAMILY MEDICINE

## 2018-04-27 PROCEDURE — 1123F ACP DISCUSS/DSCN MKR DOCD: CPT | Performed by: FAMILY MEDICINE

## 2018-04-27 PROCEDURE — G8399 PT W/DXA RESULTS DOCUMENT: HCPCS | Performed by: FAMILY MEDICINE

## 2018-04-27 PROCEDURE — G8427 DOCREV CUR MEDS BY ELIG CLIN: HCPCS | Performed by: FAMILY MEDICINE

## 2018-04-27 PROCEDURE — 4040F PNEUMOC VAC/ADMIN/RCVD: CPT | Performed by: FAMILY MEDICINE

## 2018-04-27 PROCEDURE — G8417 CALC BMI ABV UP PARAM F/U: HCPCS | Performed by: FAMILY MEDICINE

## 2018-04-27 RX ORDER — EPINEPHRINE 0.3 MG/.3ML
0.3 INJECTION SUBCUTANEOUS ONCE
Qty: 0.3 ML | Refills: 0 | Status: SHIPPED | OUTPATIENT
Start: 2018-04-27 | End: 2018-05-09 | Stop reason: SDUPTHER

## 2018-04-27 ASSESSMENT — ENCOUNTER SYMPTOMS
ABDOMINAL PAIN: 0
SHORTNESS OF BREATH: 0

## 2018-04-29 LAB — URINE CULTURE, ROUTINE: NORMAL

## 2018-05-03 ENCOUNTER — TELEPHONE (OUTPATIENT)
Dept: FAMILY MEDICINE CLINIC | Age: 73
End: 2018-05-03

## 2018-05-04 ENCOUNTER — HOSPITAL ENCOUNTER (OUTPATIENT)
Dept: CT IMAGING | Age: 73
Discharge: HOME | End: 2018-05-06
Payer: MEDICARE

## 2018-05-04 VITALS
HEART RATE: 61 BPM | BODY MASS INDEX: 35.82 KG/M2 | HEIGHT: 65 IN | RESPIRATION RATE: 16 BRPM | WEIGHT: 215 LBS | DIASTOLIC BLOOD PRESSURE: 85 MMHG | SYSTOLIC BLOOD PRESSURE: 150 MMHG

## 2018-05-04 DIAGNOSIS — R31.9 HEMATURIA, UNSPECIFIED TYPE: ICD-10-CM

## 2018-05-04 DIAGNOSIS — R31.9 HEMATURIA, UNSPECIFIED TYPE: Primary | ICD-10-CM

## 2018-05-04 PROCEDURE — 74176 CT ABD & PELVIS W/O CONTRAST: CPT

## 2018-05-09 RX ORDER — EPINEPHRINE 0.3 MG/.3ML
0.3 INJECTION SUBCUTANEOUS ONCE
Qty: 0.3 ML | Refills: 0 | Status: ON HOLD | OUTPATIENT
Start: 2018-05-09 | End: 2019-05-16

## 2018-05-10 DIAGNOSIS — I10 ESSENTIAL HYPERTENSION: ICD-10-CM

## 2018-05-10 RX ORDER — CARVEDILOL 25 MG/1
TABLET ORAL
Qty: 180 TABLET | Refills: 0 | Status: SHIPPED | OUTPATIENT
Start: 2018-05-10 | End: 2018-08-14 | Stop reason: SDUPTHER

## 2018-05-15 ENCOUNTER — OFFICE VISIT (OUTPATIENT)
Dept: FAMILY MEDICINE CLINIC | Age: 73
End: 2018-05-15
Payer: MEDICARE

## 2018-05-15 VITALS
HEIGHT: 65 IN | DIASTOLIC BLOOD PRESSURE: 80 MMHG | HEART RATE: 66 BPM | TEMPERATURE: 99 F | SYSTOLIC BLOOD PRESSURE: 122 MMHG | WEIGHT: 222.1 LBS | OXYGEN SATURATION: 96 % | RESPIRATION RATE: 16 BRPM | BODY MASS INDEX: 37 KG/M2

## 2018-05-15 DIAGNOSIS — E78.00 PURE HYPERCHOLESTEROLEMIA: ICD-10-CM

## 2018-05-15 DIAGNOSIS — K42.9 PERIUMBILICAL HERNIA: ICD-10-CM

## 2018-05-15 DIAGNOSIS — I10 ESSENTIAL HYPERTENSION: ICD-10-CM

## 2018-05-15 DIAGNOSIS — R31.9 HEMATURIA, UNSPECIFIED TYPE: Primary | ICD-10-CM

## 2018-05-15 DIAGNOSIS — F41.9 ANXIETY AND DEPRESSION: ICD-10-CM

## 2018-05-15 DIAGNOSIS — R53.83 OTHER FATIGUE: ICD-10-CM

## 2018-05-15 DIAGNOSIS — F32.A ANXIETY AND DEPRESSION: ICD-10-CM

## 2018-05-15 PROCEDURE — 99214 OFFICE O/P EST MOD 30 MIN: CPT | Performed by: FAMILY MEDICINE

## 2018-05-15 PROCEDURE — 3017F COLORECTAL CA SCREEN DOC REV: CPT | Performed by: FAMILY MEDICINE

## 2018-05-15 PROCEDURE — G8427 DOCREV CUR MEDS BY ELIG CLIN: HCPCS | Performed by: FAMILY MEDICINE

## 2018-05-15 PROCEDURE — 3288F FALL RISK ASSESSMENT DOCD: CPT | Performed by: FAMILY MEDICINE

## 2018-05-15 PROCEDURE — 1036F TOBACCO NON-USER: CPT | Performed by: FAMILY MEDICINE

## 2018-05-15 PROCEDURE — 4040F PNEUMOC VAC/ADMIN/RCVD: CPT | Performed by: FAMILY MEDICINE

## 2018-05-15 PROCEDURE — 1123F ACP DISCUSS/DSCN MKR DOCD: CPT | Performed by: FAMILY MEDICINE

## 2018-05-15 PROCEDURE — G8417 CALC BMI ABV UP PARAM F/U: HCPCS | Performed by: FAMILY MEDICINE

## 2018-05-15 PROCEDURE — G8510 SCR DEP NEG, NO PLAN REQD: HCPCS | Performed by: FAMILY MEDICINE

## 2018-05-15 PROCEDURE — 1090F PRES/ABSN URINE INCON ASSESS: CPT | Performed by: FAMILY MEDICINE

## 2018-05-15 PROCEDURE — G8399 PT W/DXA RESULTS DOCUMENT: HCPCS | Performed by: FAMILY MEDICINE

## 2018-05-15 ASSESSMENT — ENCOUNTER SYMPTOMS
ABDOMINAL PAIN: 0
SHORTNESS OF BREATH: 0

## 2018-05-15 ASSESSMENT — PATIENT HEALTH QUESTIONNAIRE - PHQ9
2. FEELING DOWN, DEPRESSED OR HOPELESS: 0
SUM OF ALL RESPONSES TO PHQ9 QUESTIONS 1 & 2: 0
SUM OF ALL RESPONSES TO PHQ QUESTIONS 1-9: 0
1. LITTLE INTEREST OR PLEASURE IN DOING THINGS: 0

## 2018-05-24 LAB
A/G RATIO: 1.3 (ref 0.9–2.4)
ALBUMIN: 4 G/DL (ref 3.4–5)
ALP BLD-CCNC: 71 U/L (ref 45–117)
ALT SERPL-CCNC: 12 U/L (ref 7–45)
ANION GAP SERPL CALCULATED.3IONS-SCNC: 12 MMOL/L (ref 10–20)
AST SERPL-CCNC: 23 U/L (ref 13–39)
BASOPHILS # BLD: 0.7 % (ref 0.1–1.2)
BASOPHILS ABSOLUTE: 0.04 10*3/UL (ref 0.01–0.07)
BICARBONATE: 30 MMOL/L (ref 21–32)
BILIRUB SERPL-MCNC: 0.4 MG/DL (ref 0–1.2)
BUN / CREAT RATIO: 21 (ref 5–25)
CALCIUM SERPL-MCNC: 9.2 MG/DL (ref 8.6–10.3)
CHLORIDE BLD-SCNC: 102 MMOL/L (ref 98–107)
CHOLESTEROL/HDL RATIO: 4.3
CHOLESTEROL: 173 MG/DL
CREAT SERPL-MCNC: 0.7 MG/DL (ref 0.5–1.05)
EOSINOPHIL # BLD: 9.8 % (ref 0–8.1)
EOSINOPHILS ABSOLUTE: 0.55 10*3/UL (ref 0.04–0.5)
ERYTHROCYTE [DISTWIDTH] IN BLOOD BY AUTOMATED COUNT: 13.2 % (ref 12–15.4)
ERYTHROCYTE [DISTWIDTH] IN BLOOD BY AUTOMATED COUNT: 45 FL (ref 39.3–48.6)
FOLATE: >23.3 NG/ML
GFR CALCULATED: >60
GLUCOSE: 107 MG/DL (ref 70–100)
HCT VFR BLD CALC: 40.3 % (ref 36.5–46.6)
HDLC SERPL-MCNC: 40 MG/DL
HEMOGLOBIN: 13 G/DL (ref 11.8–15.3)
IMMATURE GRANS (ABS): 0 10*3/UL
IMMATURE GRANULOCYTES: 0 %
LDL CHOLESTEROL CALCULATED: 91 MG/DL
LYMPHOCYTES # BLD: 24.9 % (ref 15.7–50.5)
LYMPHOCYTES ABSOLUTE: 1.4 10*3/UL (ref 0.4–2.84)
MCH RBC QN AUTO: 29.4 PG (ref 27.5–33)
MCHC RBC AUTO-ENTMCNC: 32.3 G/DL (ref 30.1–35)
MCV RBC AUTO: 91.2 FL (ref 85.4–100)
MONOCYTES # BLD: 7.3 % (ref 4.8–12.7)
MONOCYTES ABSOLUTE: 0.41 10*3/UL (ref 0.25–0.83)
NEUTROPHILS ABSOLUTE: 3.23 10*3/UL (ref 1.95–6.85)
NEUTROPHILS: 57.3 % (ref 36.8–73.2)
PLATELET # BLD: 182 10*3/UL (ref 155–404)
PMV BLD AUTO: 9.7 FL (ref 9.9–12.1)
POTASSIUM SERPL-SCNC: 3.8 MMOL/L (ref 3.5–5.1)
RBC: 4.42 10*6/UL (ref 3.85–5.1)
SODIUM BLD-SCNC: 140 MMOL/L (ref 136–145)
TOTAL PROTEIN: 7.1 G/DL (ref 6.4–8.2)
TRIGL SERPL-MCNC: 208 MG/DL
TSH SERPL DL<=0.05 MIU/L-ACNC: 3.47 MU/L (ref 0.44–3.98)
UREA NITROGEN: 15 MG/DL (ref 6–23)
VITAMIN B-12: 616 PG/ML (ref 211–911)
VLDLC SERPL CALC-MCNC: 42 MG/DL
WBC: 5.6 10*3/UL (ref 4.4–9.9)

## 2018-05-25 ENCOUNTER — TELEPHONE (OUTPATIENT)
Dept: FAMILY MEDICINE CLINIC | Age: 73
End: 2018-05-25

## 2018-05-25 ENCOUNTER — OFFICE VISIT (OUTPATIENT)
Dept: FAMILY MEDICINE CLINIC | Age: 73
End: 2018-05-25
Payer: MEDICARE

## 2018-05-25 VITALS
HEART RATE: 70 BPM | OXYGEN SATURATION: 98 % | WEIGHT: 222.4 LBS | SYSTOLIC BLOOD PRESSURE: 148 MMHG | DIASTOLIC BLOOD PRESSURE: 88 MMHG | BODY MASS INDEX: 37.05 KG/M2 | RESPIRATION RATE: 14 BRPM | HEIGHT: 65 IN | TEMPERATURE: 99.3 F

## 2018-05-25 DIAGNOSIS — I10 ESSENTIAL HYPERTENSION: ICD-10-CM

## 2018-05-25 DIAGNOSIS — F32.A ANXIETY AND DEPRESSION: Primary | ICD-10-CM

## 2018-05-25 DIAGNOSIS — F41.9 ANXIETY AND DEPRESSION: Primary | ICD-10-CM

## 2018-05-25 DIAGNOSIS — R53.83 OTHER FATIGUE: ICD-10-CM

## 2018-05-25 DIAGNOSIS — M23.91 LOCKING OF RIGHT KNEE: ICD-10-CM

## 2018-05-25 DIAGNOSIS — R31.9 HEMATURIA, UNSPECIFIED TYPE: ICD-10-CM

## 2018-05-25 DIAGNOSIS — E78.00 PURE HYPERCHOLESTEROLEMIA: ICD-10-CM

## 2018-05-25 DIAGNOSIS — M25.361 KNEE INSTABILITY, RIGHT: ICD-10-CM
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
## 2018-05-25 PROCEDURE — 4040F PNEUMOC VAC/ADMIN/RCVD: CPT | Performed by: FAMILY MEDICINE

## 2018-05-25 PROCEDURE — G8399 PT W/DXA RESULTS DOCUMENT: HCPCS | Performed by: FAMILY MEDICINE

## 2018-05-25 PROCEDURE — 1090F PRES/ABSN URINE INCON ASSESS: CPT | Performed by: FAMILY MEDICINE

## 2018-05-25 PROCEDURE — 1123F ACP DISCUSS/DSCN MKR DOCD: CPT | Performed by: FAMILY MEDICINE

## 2018-05-25 PROCEDURE — 3017F COLORECTAL CA SCREEN DOC REV: CPT | Performed by: FAMILY MEDICINE

## 2018-05-25 PROCEDURE — G8427 DOCREV CUR MEDS BY ELIG CLIN: HCPCS | Performed by: FAMILY MEDICINE

## 2018-05-25 PROCEDURE — G8417 CALC BMI ABV UP PARAM F/U: HCPCS | Performed by: FAMILY MEDICINE

## 2018-05-25 PROCEDURE — 99214 OFFICE O/P EST MOD 30 MIN: CPT | Performed by: FAMILY MEDICINE

## 2018-05-25 PROCEDURE — 1036F TOBACCO NON-USER: CPT | Performed by: FAMILY MEDICINE

## 2018-05-25 RX ORDER — CELECOXIB 200 MG/1
200 CAPSULE ORAL DAILY
Qty: 30 CAPSULE | Refills: 0 | Status: ON HOLD | OUTPATIENT
Start: 2018-05-25 | End: 2019-05-16

## 2018-05-26 ENCOUNTER — PATIENT MESSAGE (OUTPATIENT)
Dept: FAMILY MEDICINE CLINIC | Age: 73
End: 2018-05-26

## 2018-05-28 ASSESSMENT — ENCOUNTER SYMPTOMS
SHORTNESS OF BREATH: 0
ABDOMINAL PAIN: 0

## 2018-05-29 ENCOUNTER — PATIENT MESSAGE (OUTPATIENT)
Dept: FAMILY MEDICINE CLINIC | Age: 73
End: 2018-05-29

## 2018-05-29 ENCOUNTER — TELEPHONE (OUTPATIENT)
Dept: FAMILY MEDICINE CLINIC | Age: 73
End: 2018-05-29

## 2018-06-05 ENCOUNTER — HOSPITAL ENCOUNTER (OUTPATIENT)
Dept: MRI IMAGING | Age: 73
Discharge: HOME | End: 2018-06-07
Payer: MEDICARE

## 2018-06-05 DIAGNOSIS — M23.91 LOCKING OF RIGHT KNEE: ICD-10-CM
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
## 2018-06-05 DIAGNOSIS — M25.361 KNEE INSTABILITY, RIGHT: ICD-10-CM

## 2018-06-05 PROCEDURE — 73721 MRI JNT OF LWR EXTRE W/O DYE: CPT

## 2018-06-08 ENCOUNTER — PATIENT MESSAGE (OUTPATIENT)
Dept: FAMILY MEDICINE CLINIC | Age: 73
End: 2018-06-08

## 2018-06-13 ENCOUNTER — OFFICE VISIT (OUTPATIENT)
Dept: FAMILY MEDICINE CLINIC | Age: 73
End: 2018-06-13
Payer: MEDICARE

## 2018-06-13 VITALS
SYSTOLIC BLOOD PRESSURE: 118 MMHG | OXYGEN SATURATION: 96 % | HEART RATE: 65 BPM | WEIGHT: 220.9 LBS | TEMPERATURE: 98.4 F | BODY MASS INDEX: 36.8 KG/M2 | DIASTOLIC BLOOD PRESSURE: 82 MMHG | HEIGHT: 65 IN | RESPIRATION RATE: 14 BRPM

## 2018-06-13 DIAGNOSIS — S83.241A ACUTE MEDIAL MENISCUS TEAR OF RIGHT KNEE, INITIAL ENCOUNTER: Primary | ICD-10-CM

## 2018-06-13 DIAGNOSIS — F41.9 ANXIETY AND DEPRESSION: ICD-10-CM

## 2018-06-13 DIAGNOSIS — F32.A ANXIETY AND DEPRESSION: ICD-10-CM

## 2018-06-13 PROCEDURE — 3017F COLORECTAL CA SCREEN DOC REV: CPT | Performed by: FAMILY MEDICINE

## 2018-06-13 PROCEDURE — 99213 OFFICE O/P EST LOW 20 MIN: CPT | Performed by: FAMILY MEDICINE

## 2018-06-13 PROCEDURE — 4040F PNEUMOC VAC/ADMIN/RCVD: CPT | Performed by: FAMILY MEDICINE

## 2018-06-13 PROCEDURE — 1036F TOBACCO NON-USER: CPT | Performed by: FAMILY MEDICINE

## 2018-06-13 PROCEDURE — G8399 PT W/DXA RESULTS DOCUMENT: HCPCS | Performed by: FAMILY MEDICINE

## 2018-06-13 PROCEDURE — 1090F PRES/ABSN URINE INCON ASSESS: CPT | Performed by: FAMILY MEDICINE

## 2018-06-13 PROCEDURE — 1123F ACP DISCUSS/DSCN MKR DOCD: CPT | Performed by: FAMILY MEDICINE

## 2018-06-13 PROCEDURE — G8417 CALC BMI ABV UP PARAM F/U: HCPCS | Performed by: FAMILY MEDICINE

## 2018-06-13 PROCEDURE — G8427 DOCREV CUR MEDS BY ELIG CLIN: HCPCS | Performed by: FAMILY MEDICINE

## 2018-06-13 RX ORDER — SERTRALINE HYDROCHLORIDE 100 MG/1
TABLET, FILM COATED ORAL
Qty: 135 TABLET | Refills: 0 | Status: SHIPPED | OUTPATIENT
Start: 2018-06-13 | End: 2018-08-08 | Stop reason: SDUPTHER

## 2018-06-19 ENCOUNTER — OFFICE VISIT (OUTPATIENT)
Dept: UROLOGY | Age: 73
End: 2018-06-19
Payer: MEDICARE

## 2018-06-19 VITALS
HEIGHT: 65 IN | SYSTOLIC BLOOD PRESSURE: 134 MMHG | HEART RATE: 64 BPM | WEIGHT: 220 LBS | BODY MASS INDEX: 36.65 KG/M2 | DIASTOLIC BLOOD PRESSURE: 80 MMHG

## 2018-06-19 DIAGNOSIS — R31.0 GROSS HEMATURIA: Primary | ICD-10-CM

## 2018-06-19 LAB
BILIRUBIN, POC: NORMAL
BLOOD URINE, POC: NORMAL
CLARITY, POC: CLEAR
COLOR, POC: YELLOW
GLUCOSE URINE, POC: 100
KETONES, POC: 15
LEUKOCYTE EST, POC: NORMAL
NITRITE, POC: NORMAL
PH, POC: 5.5
PROTEIN, POC: 30
SPECIFIC GRAVITY, POC: >1.03
UROBILINOGEN, POC: 0.2

## 2018-06-19 PROCEDURE — 1090F PRES/ABSN URINE INCON ASSESS: CPT | Performed by: UROLOGY

## 2018-06-19 PROCEDURE — G8399 PT W/DXA RESULTS DOCUMENT: HCPCS | Performed by: UROLOGY

## 2018-06-19 PROCEDURE — 1036F TOBACCO NON-USER: CPT | Performed by: UROLOGY

## 2018-06-19 PROCEDURE — 99203 OFFICE O/P NEW LOW 30 MIN: CPT | Performed by: UROLOGY

## 2018-06-19 PROCEDURE — 81003 URINALYSIS AUTO W/O SCOPE: CPT | Performed by: UROLOGY

## 2018-06-19 PROCEDURE — 1123F ACP DISCUSS/DSCN MKR DOCD: CPT | Performed by: UROLOGY

## 2018-06-19 PROCEDURE — G8427 DOCREV CUR MEDS BY ELIG CLIN: HCPCS | Performed by: UROLOGY

## 2018-06-19 PROCEDURE — 3017F COLORECTAL CA SCREEN DOC REV: CPT | Performed by: UROLOGY

## 2018-06-19 PROCEDURE — 4040F PNEUMOC VAC/ADMIN/RCVD: CPT | Performed by: UROLOGY

## 2018-06-19 PROCEDURE — G8417 CALC BMI ABV UP PARAM F/U: HCPCS | Performed by: UROLOGY

## 2018-06-19 RX ORDER — MELOXICAM 15 MG/1
15 TABLET ORAL
Status: ON HOLD | COMMUNITY
Start: 2018-06-19 | End: 2019-05-16

## 2018-06-19 ASSESSMENT — ENCOUNTER SYMPTOMS
RESPIRATORY NEGATIVE: 1
GASTROINTESTINAL NEGATIVE: 1

## 2018-07-02 ENCOUNTER — HOSPITAL ENCOUNTER (OUTPATIENT)
Dept: GENERAL RADIOLOGY | Age: 73
Discharge: HOME | End: 2018-07-04
Payer: MEDICARE

## 2018-07-02 ENCOUNTER — HOSPITAL ENCOUNTER (OUTPATIENT)
Dept: CT IMAGING | Age: 73
Discharge: HOME | End: 2018-07-04
Payer: MEDICARE

## 2018-07-02 VITALS — BODY MASS INDEX: 35.82 KG/M2 | WEIGHT: 215 LBS | HEIGHT: 65 IN

## 2018-07-02 DIAGNOSIS — R31.0 GROSS HEMATURIA: ICD-10-CM

## 2018-07-02 PROCEDURE — 74018 RADEX ABDOMEN 1 VIEW: CPT

## 2018-07-02 PROCEDURE — 2580000003 HC RX 258: Performed by: UROLOGY

## 2018-07-02 PROCEDURE — 74178 CT ABD&PLV WO CNTR FLWD CNTR: CPT

## 2018-07-02 PROCEDURE — 6360000004 HC RX CONTRAST MEDICATION: Performed by: UROLOGY

## 2018-07-02 RX ORDER — SODIUM CHLORIDE 0.9 % (FLUSH) 0.9 %
10 SYRINGE (ML) INJECTION
Status: COMPLETED | OUTPATIENT
Start: 2018-07-02 | End: 2018-07-02

## 2018-07-02 RX ADMIN — IOPAMIDOL 100 ML: 755 INJECTION, SOLUTION INTRAVENOUS at 14:35

## 2018-07-02 RX ADMIN — Medication 10 ML: at 14:36

## 2018-07-20 ENCOUNTER — PROCEDURE VISIT (OUTPATIENT)
Dept: UROLOGY | Age: 73
End: 2018-07-20
Payer: MEDICARE

## 2018-07-20 VITALS
SYSTOLIC BLOOD PRESSURE: 138 MMHG | BODY MASS INDEX: 36.32 KG/M2 | HEIGHT: 65 IN | WEIGHT: 218 LBS | HEART RATE: 62 BPM | DIASTOLIC BLOOD PRESSURE: 90 MMHG

## 2018-07-20 DIAGNOSIS — R31.0 GROSS HEMATURIA: Primary | ICD-10-CM

## 2018-07-20 LAB
BILIRUBIN, POC: ABNORMAL
BLOOD URINE, POC: ABNORMAL
CLARITY, POC: CLEAR
COLOR, POC: YELLOW
GLUCOSE URINE, POC: 100
KETONES, POC: ABNORMAL
LEUKOCYTE EST, POC: ABNORMAL
NITRITE, POC: ABNORMAL
PH, POC: 5.5
PROTEIN, POC: 100
SPECIFIC GRAVITY, POC: 1.02
UROBILINOGEN, POC: 0.2

## 2018-07-20 PROCEDURE — G8427 DOCREV CUR MEDS BY ELIG CLIN: HCPCS | Performed by: UROLOGY

## 2018-07-20 PROCEDURE — 1101F PT FALLS ASSESS-DOCD LE1/YR: CPT | Performed by: UROLOGY

## 2018-07-20 PROCEDURE — 1090F PRES/ABSN URINE INCON ASSESS: CPT | Performed by: UROLOGY

## 2018-07-20 PROCEDURE — 3017F COLORECTAL CA SCREEN DOC REV: CPT | Performed by: UROLOGY

## 2018-07-20 PROCEDURE — 52000 CYSTOURETHROSCOPY: CPT | Performed by: UROLOGY

## 2018-07-20 PROCEDURE — 81003 URINALYSIS AUTO W/O SCOPE: CPT | Performed by: UROLOGY

## 2018-07-20 PROCEDURE — G8417 CALC BMI ABV UP PARAM F/U: HCPCS | Performed by: UROLOGY

## 2018-07-20 PROCEDURE — 1123F ACP DISCUSS/DSCN MKR DOCD: CPT | Performed by: UROLOGY

## 2018-07-20 PROCEDURE — G8399 PT W/DXA RESULTS DOCUMENT: HCPCS | Performed by: UROLOGY

## 2018-07-20 PROCEDURE — 1036F TOBACCO NON-USER: CPT | Performed by: UROLOGY

## 2018-07-20 PROCEDURE — 4040F PNEUMOC VAC/ADMIN/RCVD: CPT | Performed by: UROLOGY

## 2018-07-20 PROCEDURE — 99213 OFFICE O/P EST LOW 20 MIN: CPT | Performed by: UROLOGY

## 2018-07-20 RX ORDER — CEPHALEXIN 500 MG/1
500 CAPSULE ORAL ONCE
Qty: 1 CAPSULE | Refills: 0 | COMMUNITY
Start: 2018-07-20 | End: 2018-07-20

## 2018-07-20 ASSESSMENT — ENCOUNTER SYMPTOMS: ABDOMINAL PAIN: 0

## 2018-07-20 NOTE — PROGRESS NOTES
gas pattern. There is symmetrical excretion of contrast in renal collecting systems. The left proximal renal collecting system is slightly more dilated than the right side. The scan    shows a cyst medial to the renal pelvis, impressing upon the pelvis. The cyst may partially obstruct the left renal pelvis. Both ureters are not dilated. The uterus is anteverted and impresses upon the superior aspect of bladder. Bladder is otherwise    unremarkable. Small bowel is nondilated. No bowel obstruction. There is no evidence of free air. No pathologic calcifications are identified. There are degenerative changes in the lower lumbar spine.           Impression       MINIMAL DILATATION OF THE LEFT PROXIMAL RENAL COLLECTING SYSTEM. THERE IS A KNOWN CYST AT THE LEVEL OF THE RENAL PELVIS WHICH MAY BE PARTIALLY OBSTRUCTING THE LEFT UPJ. THERE IS CONTRAST IN THE URETERS AND URETERS ARE NOT DILATED.       NONOBSTRUCTIVE BOWEL GAS PATTERN.       NO FREE AIR.         7/3/2018 12:19 PM Hubert, Chpo Incoming Radiant Results From [facility] Balaji Ramos MD Results   Radiation Dose Estimates     No radiation information found for this patient   Narrative   EXAMINATION: CT UROGRAM CT RENAL MASS PROTOCOL        DATE AND TIME:7/2/2018 1:55 PM       CLINICAL HISTORY: Hematuria R31.0 Gross hematuria ICD10        COMPARISON: May 4, 2018       Technique: Initial precontrast axial images were performed through the abdomen.  Following this, helical CT was performed through the abdomen and pelvis utilizing 100 cc of Optiray 320.  Images were obtained in the nephrographic and excretory renal    phases.  Coronal reconstructions were performed during excretory renal phase. All CT scans at this facility use dose modulation, iterative reconstruction, and/or weight based dosing when appropriate to reduce radiation dose to as low as reasonably    achievable.  No enteric contrast was administered.       Findings:Kidneys:          Kidneys: No  2:20 PM Unknown   100    Bilirubin, UA 07/20/2018  2:20 PM Unknown   small    Ketones, UA 07/20/2018  2:20 PM Unknown   trace    Spec Grav, UA 07/20/2018  2:20 PM Unknown   1.025    Blood, UA POC 07/20/2018  2:20 PM Unknown   neg    pH, UA 07/20/2018  2:20 PM Unknown   5.5    Protein, UA POC 07/20/2018  2:20 PM Unknown   100    Urobilinogen, UA 07/20/2018  2:20 PM Unknown   0.2    Leukocytes, UA 07/20/2018  2:20 PM Unknown   small    Nitrite, UA 07/20/2018  2:20 PM Unknown   neg          Cystoscopy Procedure Note    Pre-operative Diagnosis: Gross hematuria    Post-operative Diagnosis: Same plus benign findings    Surgeon: Tai Mak     Assistants: Sidney Parks. ZOE Momin    Anesthesia: Local anesthesia topical 2% lidocaine gel    Procedure Details   The risks, benefits, complications, treatment options, and expected outcomes were discussed with the patient. The patient concurred with the proposed plan, giving informed consent. Cystoscopy was performed today under local anesthesia, using sterile technique. The patient was placed in the supine position, prepped and draped in the usual sterile fashion. A flexible cystoscope was used to inspect the entire bladder including retroflexion. Findings:  Urethra:normal  Bladder: Normal mucosa without bladder tumors, stones, clots or FB  Ureteral orifice(s) were normal . Ureteral orifice(s) were in the normal location. Specimens: None                 Complications:  None; patient tolerated the procedure well. Disposition: To home. Condition: stable    Assessment: This is a 74 yo female with h/o HTN, CVA/TIA causing balance issues, OA/Lt total knee, Asthma, and now a negative hematuria evaluation. The CT shows no stones. I reassured the patient of these findings and she will follow-up if gets recurrent hematuria. Plan:      1. F/U prn  2.  Keflex 500 mg po x one prior

## 2018-07-25 ENCOUNTER — HOSPITAL ENCOUNTER (OUTPATIENT)
Dept: CARDIOLOGY | Age: 73
Discharge: HOME | End: 2018-07-25
Payer: MEDICARE

## 2018-07-25 PROCEDURE — 93299 HC INTERROG REMOTE LOOP RECORDER: CPT

## 2018-08-08 DIAGNOSIS — F41.9 ANXIETY AND DEPRESSION: ICD-10-CM

## 2018-08-08 DIAGNOSIS — F32.A ANXIETY AND DEPRESSION: ICD-10-CM

## 2018-08-08 RX ORDER — SERTRALINE HYDROCHLORIDE 100 MG/1
TABLET, FILM COATED ORAL
Qty: 135 TABLET | Refills: 0 | Status: SHIPPED | OUTPATIENT
Start: 2018-08-08 | End: 2018-11-05 | Stop reason: SDUPTHER

## 2018-08-14 DIAGNOSIS — I10 ESSENTIAL HYPERTENSION: ICD-10-CM

## 2018-08-14 RX ORDER — CARVEDILOL 25 MG/1
TABLET ORAL
Qty: 180 TABLET | Refills: 0 | Status: SHIPPED | OUTPATIENT
Start: 2018-08-14 | End: 2018-11-05 | Stop reason: SDUPTHER

## 2018-08-14 RX ORDER — DOXAZOSIN MESYLATE 4 MG/1
TABLET ORAL
Qty: 90 TABLET | Refills: 0 | Status: SHIPPED | OUTPATIENT
Start: 2018-08-14 | End: 2018-11-05 | Stop reason: SDUPTHER

## 2018-10-22 ENCOUNTER — HOSPITAL ENCOUNTER (OUTPATIENT)
Dept: CARDIOLOGY | Age: 73
Discharge: HOME | End: 2018-10-22
Payer: MEDICARE

## 2018-10-22 PROCEDURE — 93299 HC INTERROG REMOTE LOOP RECORDER: CPT

## 2018-11-05 DIAGNOSIS — F32.A ANXIETY AND DEPRESSION: ICD-10-CM

## 2018-11-05 DIAGNOSIS — F41.9 ANXIETY AND DEPRESSION: ICD-10-CM

## 2018-11-05 DIAGNOSIS — I10 ESSENTIAL HYPERTENSION: ICD-10-CM

## 2018-11-05 RX ORDER — DOXAZOSIN MESYLATE 4 MG/1
TABLET ORAL
Qty: 90 TABLET | Refills: 0 | Status: SHIPPED | OUTPATIENT
Start: 2018-11-05 | End: 2019-01-12 | Stop reason: SDUPTHER

## 2018-11-05 RX ORDER — CARVEDILOL 25 MG/1
TABLET ORAL
Qty: 180 TABLET | Refills: 0 | Status: SHIPPED | OUTPATIENT
Start: 2018-11-05 | End: 2019-01-12 | Stop reason: SDUPTHER

## 2018-11-05 RX ORDER — SERTRALINE HYDROCHLORIDE 100 MG/1
TABLET, FILM COATED ORAL
Qty: 135 TABLET | Refills: 0 | Status: SHIPPED | OUTPATIENT
Start: 2018-11-05 | End: 2019-01-12 | Stop reason: SDUPTHER

## 2018-11-07 ENCOUNTER — HOSPITAL ENCOUNTER (OUTPATIENT)
Dept: WOMENS IMAGING | Age: 73
Discharge: HOME | End: 2018-11-09
Payer: MEDICARE

## 2018-11-07 DIAGNOSIS — Z12.31 ENCOUNTER FOR SCREENING MAMMOGRAM FOR BREAST CANCER: ICD-10-CM

## 2018-11-07 PROCEDURE — 77063 BREAST TOMOSYNTHESIS BI: CPT

## 2018-12-26 ENCOUNTER — OFFICE VISIT (OUTPATIENT)
Dept: FAMILY MEDICINE CLINIC | Age: 73
End: 2018-12-26
Payer: MEDICARE

## 2018-12-26 ENCOUNTER — HOSPITAL ENCOUNTER (EMERGENCY)
Age: 73
Discharge: HOME | End: 2018-12-26
Attending: EMERGENCY MEDICINE
Payer: MEDICARE

## 2018-12-26 VITALS
OXYGEN SATURATION: 98 % | WEIGHT: 215 LBS | HEIGHT: 65 IN | HEART RATE: 94 BPM | DIASTOLIC BLOOD PRESSURE: 74 MMHG | TEMPERATURE: 98.7 F | SYSTOLIC BLOOD PRESSURE: 141 MMHG | BODY MASS INDEX: 35.82 KG/M2 | RESPIRATION RATE: 20 BRPM

## 2018-12-26 VITALS
WEIGHT: 224.6 LBS | HEIGHT: 65 IN | SYSTOLIC BLOOD PRESSURE: 132 MMHG | HEART RATE: 63 BPM | TEMPERATURE: 98.7 F | OXYGEN SATURATION: 99 % | BODY MASS INDEX: 37.42 KG/M2 | DIASTOLIC BLOOD PRESSURE: 84 MMHG

## 2018-12-26 DIAGNOSIS — H10.33 ACUTE CONJUNCTIVITIS OF BOTH EYES, UNSPECIFIED ACUTE CONJUNCTIVITIS TYPE: Primary | ICD-10-CM

## 2018-12-26 DIAGNOSIS — H01.00B BLEPHARITIS OF BOTH UPPER AND LOWER EYELID OF LEFT EYE, UNSPECIFIED TYPE: ICD-10-CM

## 2018-12-26 DIAGNOSIS — B30.9 ACUTE VIRAL CONJUNCTIVITIS OF BOTH EYES: Primary | ICD-10-CM

## 2018-12-26 PROCEDURE — 99282 EMERGENCY DEPT VISIT SF MDM: CPT

## 2018-12-26 PROCEDURE — 99211 OFF/OP EST MAY X REQ PHY/QHP: CPT | Performed by: NURSE PRACTITIONER

## 2018-12-26 RX ORDER — PREDNISOLONE ACETATE 10 MG/ML
1 SUSPENSION/ DROPS OPHTHALMIC 4 TIMES DAILY
Qty: 1 BOTTLE | Refills: 0 | Status: SHIPPED | OUTPATIENT
Start: 2018-12-26 | End: 2019-01-02

## 2018-12-26 RX ORDER — AMOXICILLIN AND CLAVULANATE POTASSIUM 875; 125 MG/1; MG/1
1 TABLET, FILM COATED ORAL 2 TIMES DAILY
Qty: 20 TABLET | Refills: 0 | Status: SHIPPED | OUTPATIENT
Start: 2018-12-26 | End: 2019-01-05

## 2018-12-26 ASSESSMENT — VISUAL ACUITY
OD: 20/40
OU: 20/30
OS: 20/50

## 2018-12-26 ASSESSMENT — ENCOUNTER SYMPTOMS
BACK PAIN: 0
SHORTNESS OF BREATH: 0
EYE REDNESS: 1
COUGH: 0
PHOTOPHOBIA: 0
ABDOMINAL PAIN: 0
EYE PAIN: 1
EYE ITCHING: 0
EYE DISCHARGE: 1

## 2018-12-26 ASSESSMENT — PAIN SCALES - GENERAL: PAINLEVEL_OUTOF10: 5

## 2018-12-26 NOTE — ED PROVIDER NOTES
DISPOSITION/PLAN   DISPOSITION Decision To Discharge 12/26/2018 02:07:58 PM          RUMA Mensah CNP (electronically signed)  Attending Emergency Physician        RUMA Mensah CNP  12/26/18 [id number]    Attending Supervisory Note/Shared Visit   I have personally performed a face to face diagnostic evaluation on this patient. I have reviewed the mid-levels findings and agree.   History and Exam by me shows Conjunctivitis and preseptal cellulitis      Alida Kirby DO  Attending Emergency Physician         Alida Kirby DO  12/26/18 [id number]

## 2018-12-29 ENCOUNTER — OFFICE VISIT (OUTPATIENT)
Dept: FAMILY MEDICINE CLINIC | Age: 73
End: 2018-12-29
Payer: MEDICARE

## 2018-12-29 VITALS
HEART RATE: 80 BPM | BODY MASS INDEX: 35.78 KG/M2 | DIASTOLIC BLOOD PRESSURE: 68 MMHG | HEIGHT: 65 IN | TEMPERATURE: 98.3 F | SYSTOLIC BLOOD PRESSURE: 122 MMHG | RESPIRATION RATE: 18 BRPM

## 2018-12-29 DIAGNOSIS — F41.9 ANXIETY AND DEPRESSION: ICD-10-CM

## 2018-12-29 DIAGNOSIS — J45.41 RAD (REACTIVE AIRWAY DISEASE), MODERATE PERSISTENT, WITH ACUTE EXACERBATION: ICD-10-CM

## 2018-12-29 DIAGNOSIS — F32.A ANXIETY AND DEPRESSION: ICD-10-CM

## 2018-12-29 DIAGNOSIS — I10 ESSENTIAL HYPERTENSION: Primary | ICD-10-CM

## 2018-12-29 DIAGNOSIS — H10.33 ACUTE CONJUNCTIVITIS OF BOTH EYES, UNSPECIFIED ACUTE CONJUNCTIVITIS TYPE: ICD-10-CM

## 2018-12-29 PROCEDURE — 1090F PRES/ABSN URINE INCON ASSESS: CPT | Performed by: FAMILY MEDICINE

## 2018-12-29 PROCEDURE — 1036F TOBACCO NON-USER: CPT | Performed by: FAMILY MEDICINE

## 2018-12-29 PROCEDURE — G8399 PT W/DXA RESULTS DOCUMENT: HCPCS | Performed by: FAMILY MEDICINE

## 2018-12-29 PROCEDURE — 1123F ACP DISCUSS/DSCN MKR DOCD: CPT | Performed by: FAMILY MEDICINE

## 2018-12-29 PROCEDURE — 99214 OFFICE O/P EST MOD 30 MIN: CPT | Performed by: FAMILY MEDICINE

## 2018-12-29 PROCEDURE — 3017F COLORECTAL CA SCREEN DOC REV: CPT | Performed by: FAMILY MEDICINE

## 2018-12-29 PROCEDURE — G8484 FLU IMMUNIZE NO ADMIN: HCPCS | Performed by: FAMILY MEDICINE

## 2018-12-29 PROCEDURE — G8417 CALC BMI ABV UP PARAM F/U: HCPCS | Performed by: FAMILY MEDICINE

## 2018-12-29 PROCEDURE — 4040F PNEUMOC VAC/ADMIN/RCVD: CPT | Performed by: FAMILY MEDICINE

## 2018-12-29 PROCEDURE — G8427 DOCREV CUR MEDS BY ELIG CLIN: HCPCS | Performed by: FAMILY MEDICINE

## 2018-12-29 PROCEDURE — 1101F PT FALLS ASSESS-DOCD LE1/YR: CPT | Performed by: FAMILY MEDICINE

## 2018-12-29 RX ORDER — LOTEPREDNOL ETABONATE 5 MG/G
OINTMENT OPHTHALMIC
Refills: 0 | Status: ON HOLD | COMMUNITY
Start: 2018-12-26 | End: 2019-05-16

## 2018-12-29 RX ORDER — DOXYCYCLINE HYCLATE 50 MG/1
TABLET, FILM COATED ORAL
COMMUNITY
End: 2018-12-29 | Stop reason: ALTCHOICE

## 2018-12-29 ASSESSMENT — ENCOUNTER SYMPTOMS
ABDOMINAL PAIN: 0
SHORTNESS OF BREATH: 0

## 2018-12-29 NOTE — PROGRESS NOTES
History Main Topics    Smoking status: Never Smoker    Smokeless tobacco: Never Used    Alcohol use No    Drug use: No    Sexual activity: Not Asked     Other Topics Concern    None     Social History Narrative    ** Merged History Encounter **          Current Outpatient Prescriptions   Medication Sig Dispense Refill    fluorometholone (FML) 0.1 % ophthalmic ointment Apply lightly to skin around eyes three times a day x 3 days, then twice a day x 3 days then once a day x 3 days then d/c      LOTEMAX 0.5 % OINT   0    ADVAIR DISKUS 500-50 MCG/DOSE diskus inhaler USE 1 PUFF EVERY 12 HOURS 120 each 0    prednisoLONE acetate (PRED FORTE) 1 % ophthalmic suspension Place 1 drop into both eyes 4 times daily for 7 days 1 Bottle 0    amoxicillin-clavulanate (AUGMENTIN) 875-125 MG per tablet Take 1 tablet by mouth 2 times daily for 10 days 20 tablet 0    HYDROcodone-acetaminophen (NORCO) 5-325 MG per tablet Take 1 tablet by mouth 2 times daily as needed for Pain for up to 30 days. . 30 tablet 0    carvedilol (COREG) 25 MG tablet TAKE 1 TABLET BY MOUTH TWO  TIMES DAILY WITH MEALS 180 tablet 0    doxazosin (CARDURA) 4 MG tablet TAKE 1 TABLET BY MOUTH  NIGHTLY 90 tablet 0    sertraline (ZOLOFT) 100 MG tablet TAKE 1 AND 1/2 TABLETS  DAILY 135 tablet 0    meloxicam (MOBIC) 15 MG tablet Take 15 mg by mouth      celecoxib (CELEBREX) 200 MG capsule Take 1 capsule by mouth daily 30 capsule 0    albuterol sulfate HFA (PROAIR HFA) 108 (90 Base) MCG/ACT inhaler Inhale 2 puffs into the lungs every 6 hours as needed for Wheezing 1 Inhaler 0    tiZANidine (ZANAFLEX) 4 MG tablet Take 1 tablet by mouth 2 times daily 60 tablet 2    lidocaine (XYLOCAINE) 5 % ointment Apply topically as needed.  2 Tube 0    Multiple Vitamins-Minerals (MULTIVITAMIN PO) Take by mouth daily       Respiratory Therapy Supplies (NEBULIZER AIR TUBE/PLUGS) MISC As directed 1 each 1    aspirin 325 MG EC tablet Take 325 mg by mouth every 6 hours as

## 2019-01-12 DIAGNOSIS — I10 ESSENTIAL HYPERTENSION: ICD-10-CM

## 2019-01-12 DIAGNOSIS — F32.A ANXIETY AND DEPRESSION: ICD-10-CM

## 2019-01-12 DIAGNOSIS — F41.9 ANXIETY AND DEPRESSION: ICD-10-CM

## 2019-01-13 RX ORDER — CARVEDILOL 25 MG/1
TABLET ORAL
Qty: 180 TABLET | Refills: 1 | Status: SHIPPED | OUTPATIENT
Start: 2019-01-13 | End: 2019-07-29 | Stop reason: SDUPTHER

## 2019-01-13 RX ORDER — DOXAZOSIN MESYLATE 4 MG/1
TABLET ORAL
Qty: 90 TABLET | Refills: 1 | Status: SHIPPED | OUTPATIENT
Start: 2019-01-13 | End: 2019-07-29 | Stop reason: SDUPTHER

## 2019-01-13 RX ORDER — SERTRALINE HYDROCHLORIDE 100 MG/1
TABLET, FILM COATED ORAL
Qty: 135 TABLET | Refills: 1 | Status: SHIPPED | OUTPATIENT
Start: 2019-01-13 | End: 2019-08-07 | Stop reason: SDUPTHER

## 2019-01-21 ENCOUNTER — HOSPITAL ENCOUNTER (OUTPATIENT)
Dept: CARDIOLOGY | Age: 74
Discharge: HOME | End: 2019-01-21
Payer: MEDICARE

## 2019-01-21 PROCEDURE — 93299 HC INTERROG REMOTE LOOP RECORDER: CPT

## 2019-03-01 ENCOUNTER — OFFICE VISIT (OUTPATIENT)
Dept: FAMILY MEDICINE CLINIC | Age: 74
End: 2019-03-01
Payer: MEDICARE

## 2019-03-01 VITALS
RESPIRATION RATE: 16 BRPM | OXYGEN SATURATION: 97 % | WEIGHT: 225 LBS | HEIGHT: 65 IN | SYSTOLIC BLOOD PRESSURE: 136 MMHG | TEMPERATURE: 97.6 F | DIASTOLIC BLOOD PRESSURE: 82 MMHG | HEART RATE: 63 BPM | BODY MASS INDEX: 37.49 KG/M2

## 2019-03-01 DIAGNOSIS — I10 ESSENTIAL HYPERTENSION: ICD-10-CM

## 2019-03-01 DIAGNOSIS — E78.00 PURE HYPERCHOLESTEROLEMIA: ICD-10-CM

## 2019-03-01 DIAGNOSIS — Z23 NEED FOR PROPHYLACTIC VACCINATION AND INOCULATION AGAINST VARICELLA: ICD-10-CM

## 2019-03-01 DIAGNOSIS — F32.A ANXIETY AND DEPRESSION: ICD-10-CM

## 2019-03-01 DIAGNOSIS — Z23 NEED FOR PROPHYLACTIC VACCINATION AGAINST STREPTOCOCCUS PNEUMONIAE (PNEUMOCOCCUS): ICD-10-CM

## 2019-03-01 DIAGNOSIS — F41.9 ANXIETY AND DEPRESSION: ICD-10-CM

## 2019-03-01 DIAGNOSIS — M47.817 SPONDYLOSIS OF LUMBOSACRAL JOINT WITHOUT MYELOPATHY: ICD-10-CM

## 2019-03-01 DIAGNOSIS — M46.1 SACROILIAC INFLAMMATION (HCC): ICD-10-CM

## 2019-03-01 DIAGNOSIS — K42.9 PERIUMBILICAL HERNIA: ICD-10-CM

## 2019-03-01 DIAGNOSIS — Z23 NEED FOR PROPHYLACTIC VACCINATION WITH TETANUS-DIPHTHERIA (TD): ICD-10-CM

## 2019-03-01 DIAGNOSIS — Z00.00 ROUTINE GENERAL MEDICAL EXAMINATION AT A HEALTH CARE FACILITY: Primary | ICD-10-CM

## 2019-03-01 DIAGNOSIS — J45.41 RAD (REACTIVE AIRWAY DISEASE), MODERATE PERSISTENT, WITH ACUTE EXACERBATION: ICD-10-CM

## 2019-03-01 PROCEDURE — G8484 FLU IMMUNIZE NO ADMIN: HCPCS | Performed by: FAMILY MEDICINE

## 2019-03-01 PROCEDURE — G0438 PPPS, INITIAL VISIT: HCPCS | Performed by: FAMILY MEDICINE

## 2019-03-01 PROCEDURE — 4040F PNEUMOC VAC/ADMIN/RCVD: CPT | Performed by: FAMILY MEDICINE

## 2019-03-01 RX ORDER — TETANUS AND DIPHTHERIA TOXOIDS ADSORBED 2; 2 [LF]/.5ML; [LF]/.5ML
0.5 INJECTION INTRAMUSCULAR ONCE
Qty: 0.5 ML | Refills: 0 | Status: SHIPPED | OUTPATIENT
Start: 2019-03-01 | End: 2019-03-01

## 2019-03-01 ASSESSMENT — ANXIETY QUESTIONNAIRES: GAD7 TOTAL SCORE: 1

## 2019-03-01 ASSESSMENT — PATIENT HEALTH QUESTIONNAIRE - PHQ9
SUM OF ALL RESPONSES TO PHQ QUESTIONS 1-9: 2
SUM OF ALL RESPONSES TO PHQ QUESTIONS 1-9: 2

## 2019-03-01 ASSESSMENT — LIFESTYLE VARIABLES: HOW OFTEN DO YOU HAVE A DRINK CONTAINING ALCOHOL: 0

## 2019-04-24 ENCOUNTER — HOSPITAL ENCOUNTER (OUTPATIENT)
Dept: CARDIOLOGY | Age: 74
Discharge: HOME | End: 2019-04-24
Payer: MEDICARE

## 2019-04-24 PROCEDURE — 93299 HC INTERROG REMOTE LOOP RECORDER: CPT

## 2019-05-01 ENCOUNTER — OFFICE VISIT (OUTPATIENT)
Dept: FAMILY MEDICINE CLINIC | Age: 74
End: 2019-05-01
Payer: MEDICARE

## 2019-05-01 VITALS
HEIGHT: 65 IN | SYSTOLIC BLOOD PRESSURE: 126 MMHG | RESPIRATION RATE: 10 BRPM | OXYGEN SATURATION: 100 % | HEART RATE: 69 BPM | WEIGHT: 226.2 LBS | TEMPERATURE: 98.5 F | BODY MASS INDEX: 37.69 KG/M2 | DIASTOLIC BLOOD PRESSURE: 82 MMHG

## 2019-05-01 DIAGNOSIS — R35.0 FREQUENCY OF URINATION: Primary | ICD-10-CM

## 2019-05-01 DIAGNOSIS — K42.9 PERIUMBILICAL HERNIA: ICD-10-CM

## 2019-05-01 DIAGNOSIS — K40.90 UNILATERAL INGUINAL HERNIA WITHOUT OBSTRUCTION OR GANGRENE, RECURRENCE NOT SPECIFIED: ICD-10-CM

## 2019-05-01 LAB
BILIRUBIN, POC: ABNORMAL
BLOOD URINE, POC: ABNORMAL
CLARITY, POC: ABNORMAL
COLOR, POC: ABNORMAL
GLUCOSE URINE, POC: ABNORMAL
KETONES, POC: ABNORMAL
LEUKOCYTE EST, POC: ABNORMAL
NITRITE, POC: ABNORMAL
PH, POC: 6
PROTEIN, POC: ABNORMAL
SPECIFIC GRAVITY, POC: 1.02
UROBILINOGEN, POC: ABNORMAL

## 2019-05-01 PROCEDURE — 3017F COLORECTAL CA SCREEN DOC REV: CPT | Performed by: FAMILY MEDICINE

## 2019-05-01 PROCEDURE — 4040F PNEUMOC VAC/ADMIN/RCVD: CPT | Performed by: FAMILY MEDICINE

## 2019-05-01 PROCEDURE — G8427 DOCREV CUR MEDS BY ELIG CLIN: HCPCS | Performed by: FAMILY MEDICINE

## 2019-05-01 PROCEDURE — 1123F ACP DISCUSS/DSCN MKR DOCD: CPT | Performed by: FAMILY MEDICINE

## 2019-05-01 PROCEDURE — G8417 CALC BMI ABV UP PARAM F/U: HCPCS | Performed by: FAMILY MEDICINE

## 2019-05-01 PROCEDURE — G8399 PT W/DXA RESULTS DOCUMENT: HCPCS | Performed by: FAMILY MEDICINE

## 2019-05-01 PROCEDURE — 81003 URINALYSIS AUTO W/O SCOPE: CPT | Performed by: FAMILY MEDICINE

## 2019-05-01 PROCEDURE — 1090F PRES/ABSN URINE INCON ASSESS: CPT | Performed by: FAMILY MEDICINE

## 2019-05-01 PROCEDURE — 99213 OFFICE O/P EST LOW 20 MIN: CPT | Performed by: FAMILY MEDICINE

## 2019-05-01 PROCEDURE — 1036F TOBACCO NON-USER: CPT | Performed by: FAMILY MEDICINE

## 2019-05-01 RX ORDER — NITROFURANTOIN 25; 75 MG/1; MG/1
100 CAPSULE ORAL 2 TIMES DAILY
Qty: 14 CAPSULE | Refills: 0 | Status: SHIPPED | OUTPATIENT
Start: 2019-05-01 | End: 2019-05-15

## 2019-05-01 ASSESSMENT — ENCOUNTER SYMPTOMS
ABDOMINAL DISTENTION: 0
BACK PAIN: 0

## 2019-05-01 NOTE — PROGRESS NOTES
Subjective:      Patient ID: Sushila Orosco is a 75 y.o. female    HPI  Here with foul odor to urine and increased frequency of urination and some urinary urgency. Normal bm yesterday. No hematuria. Would like to get referral for inguinal and umbilical hernia which she had noted previously    Review of Systems   Constitutional: Negative for chills and fever. Gastrointestinal: Negative for abdominal distention. Musculoskeletal: Negative for back pain. Skin: Negative for rash. Neurological: Negative for weakness.      Reviewed allergy, medical, social, surgical, family and med list changes and updated   Files     Social History     Socioeconomic History    Marital status:      Spouse name: None    Number of children: None    Years of education: None    Highest education level: None   Occupational History    None   Social Needs    Financial resource strain: None    Food insecurity:     Worry: None     Inability: None    Transportation needs:     Medical: None     Non-medical: None   Tobacco Use    Smoking status: Never Smoker    Smokeless tobacco: Never Used   Substance and Sexual Activity    Alcohol use: No    Drug use: No    Sexual activity: None   Lifestyle    Physical activity:     Days per week: None     Minutes per session: None    Stress: None   Relationships    Social connections:     Talks on phone: None     Gets together: None     Attends [employer] service: None     Active member of club or organization: None     Attends meetings of clubs or organizations: None     Relationship status: None    Intimate partner violence:     Fear of current or ex partner: None     Emotionally abused: None     Physically abused: None     Forced sexual activity: None   Other Topics Concern    None   Social History Narrative    ** Merged History Encounter **          Current Outpatient Medications   Medication Sig Dispense Refill    carvedilol (COREG) 25 MG tablet TAKE 1 TABLET BY MOUTH TWO TIMES DAILY WITH MEALS 180 tablet 1    sertraline (ZOLOFT) 100 MG tablet TAKE 1 AND 1/2 TABLETS  DAILY 135 tablet 1    doxazosin (CARDURA) 4 MG tablet TAKE 1 TABLET BY MOUTH  NIGHTLY 90 tablet 1    fluorometholone (FML) 0.1 % ophthalmic ointment Apply lightly to skin around eyes three times a day x 3 days, then twice a day x 3 days then once a day x 3 days then d/c      LOTEMAX 0.5 % OINT   0    ADVAIR DISKUS 500-50 MCG/DOSE diskus inhaler USE 1 PUFF EVERY 12 HOURS 120 each 0    meloxicam (MOBIC) 15 MG tablet Take 15 mg by mouth      celecoxib (CELEBREX) 200 MG capsule Take 1 capsule by mouth daily 30 capsule 0    albuterol sulfate HFA (PROAIR HFA) 108 (90 Base) MCG/ACT inhaler Inhale 2 puffs into the lungs every 6 hours as needed for Wheezing 1 Inhaler 0    tiZANidine (ZANAFLEX) 4 MG tablet Take 1 tablet by mouth 2 times daily 60 tablet 2    lidocaine (XYLOCAINE) 5 % ointment Apply topically as needed. 2 Tube 0    Multiple Vitamins-Minerals (MULTIVITAMIN PO) Take by mouth daily       Respiratory Therapy Supplies (NEBULIZER AIR TUBE/PLUGS) MISC As directed 1 each 1    aspirin 325 MG EC tablet Take 325 mg by mouth every 6 hours as needed for Pain      HYDROcodone-acetaminophen (NORCO) 5-325 MG per tablet Take 1 tablet by mouth 2 times daily as needed for Pain for up to 30 days. 30 tablet 0    EPINEPHrine (EPIPEN 2-DE) 0.3 MG/0.3ML SOAJ injection Inject 0.3 mLs into the muscle once for 1 dose Use as directed for allergic reaction.  Please give generic 0.3 mL 0     Current Facility-Administered Medications   Medication Dose Route Frequency Provider Last Rate Last Dose    betamethasone acetate-betamethasone sodium phosphate (CELESTONE) injection 6 mg  6 mg Intra-Lesional Once Treasure Cespedes MD         Family History   Problem Relation Age of Onset    Cancer Mother         BREAST, COLON    High Blood Pressure Father      Past Medical History:   Diagnosis Date    Hyperlipidemia     Hypertension  Stroke (cerebrum) ([facility] [address] 08/01/2015    TIA (transient ischemic attack)      Objective:   /82   Pulse 69   Temp 98.5 °F (36.9 °C) (Tympanic)   Resp 10   Ht 5' 5\" (1.651 m)   Wt 226 lb 3.2 oz (102.6 kg)   LMP  (LMP Unknown)   SpO2 100%   BMI 37.64 kg/m²     Physical Exam  Lungs:Clear  Equal b.s bilaterally  Heart:  Rate reg     No murmur  Back:       No  CVA tenderness  Abdomen: B.S present   Soft            No Tenderness          No Rebound. Not reducible periumbilical hernia noted                     No hepatosplenomegaly. No masses. Gen:      No acute distress  Skin:      No rashes   Assessment:        Diagnosis Orders   1. Frequency of urination  POCT Urinalysis No Micro (Auto)    Urine Culture   2. Periumbilical hernia     3. Unilateral inguinal hernia without obstruction or gangrene, recurrence not specified        Plan:      Orders Placed This Encounter   Procedures    Urine Culture     Standing Status:   Future     Standing Expiration Date:   5/1/2020     Order Specific Question:   Specify (ex-cath, midstream, cysto, etc)?      Answer:   clean catch   Rivas Pickett MD, General Surgery, [place]     Referral Priority:   Routine     Referral Type:   Eval and Treat     Referral Reason:   Specialty Services Required     Referred to Provider:   Shira Foreman MD     Requested Specialty:   General Surgery     Number of Visits Requested:   1    POCT Urinalysis No Micro (Auto)     Orders Placed This Encounter   Medications    nitrofurantoin, macrocrystal-monohydrate, (MACROBID) 100 MG capsule     Sig: Take 1 capsule by mouth 2 times daily for 14 days     Dispense:  14 capsule     Refill:  0   f/u in next few months after fasting blood work

## 2019-05-02 DIAGNOSIS — R35.0 FREQUENCY OF URINATION: ICD-10-CM

## 2019-05-04 LAB — URINE CULTURE, ROUTINE: NORMAL

## 2019-05-15 ENCOUNTER — OFFICE VISIT (OUTPATIENT)
Dept: SURGERY | Age: 74
End: 2019-05-15
Payer: MEDICARE

## 2019-05-15 VITALS
BODY MASS INDEX: 37.85 KG/M2 | SYSTOLIC BLOOD PRESSURE: 128 MMHG | WEIGHT: 227.2 LBS | DIASTOLIC BLOOD PRESSURE: 78 MMHG | HEIGHT: 65 IN | TEMPERATURE: 97.1 F

## 2019-05-15 DIAGNOSIS — K42.0 UMBILICAL HERNIA WITH OBSTRUCTION, WITHOUT GANGRENE: ICD-10-CM

## 2019-05-15 DIAGNOSIS — K40.90 LEFT INGUINAL HERNIA: ICD-10-CM

## 2019-05-15 PROCEDURE — G8427 DOCREV CUR MEDS BY ELIG CLIN: HCPCS | Performed by: SURGERY

## 2019-05-15 PROCEDURE — G8399 PT W/DXA RESULTS DOCUMENT: HCPCS | Performed by: SURGERY

## 2019-05-15 PROCEDURE — 1123F ACP DISCUSS/DSCN MKR DOCD: CPT | Performed by: SURGERY

## 2019-05-15 PROCEDURE — 1090F PRES/ABSN URINE INCON ASSESS: CPT | Performed by: SURGERY

## 2019-05-15 PROCEDURE — G8417 CALC BMI ABV UP PARAM F/U: HCPCS | Performed by: SURGERY

## 2019-05-15 PROCEDURE — 1036F TOBACCO NON-USER: CPT | Performed by: SURGERY

## 2019-05-15 PROCEDURE — 4040F PNEUMOC VAC/ADMIN/RCVD: CPT | Performed by: SURGERY

## 2019-05-15 PROCEDURE — 99202 OFFICE O/P NEW SF 15 MIN: CPT | Performed by: SURGERY

## 2019-05-15 PROCEDURE — 3017F COLORECTAL CA SCREEN DOC REV: CPT | Performed by: SURGERY

## 2019-05-16 ENCOUNTER — HOSPITAL ENCOUNTER (OUTPATIENT)
Dept: CARDIAC CATH/INVASIVE PROCEDURES | Age: 74
Discharge: HOME | End: 2019-05-16
Attending: INTERNAL MEDICINE | Admitting: INTERNAL MEDICINE
Payer: MEDICARE

## 2019-05-16 PROCEDURE — 6360000002 HC RX W HCPCS

## 2019-05-16 PROCEDURE — 6360000002 HC RX W HCPCS: Performed by: INTERNAL MEDICINE

## 2019-05-16 PROCEDURE — 2500000003 HC RX 250 WO HCPCS

## 2019-05-16 PROCEDURE — 33286 RMVL SUBQ CAR RHYTHM MNTR: CPT | Performed by: INTERNAL MEDICINE

## 2019-05-16 PROCEDURE — 6370000000 HC RX 637 (ALT 250 FOR IP)

## 2019-05-16 PROCEDURE — 2580000003 HC RX 258: Performed by: INTERNAL MEDICINE

## 2019-05-16 RX ORDER — SODIUM CHLORIDE 0.9 % (FLUSH) 0.9 %
10 SYRINGE (ML) INJECTION EVERY 12 HOURS SCHEDULED
Status: DISCONTINUED | OUTPATIENT
Start: 2019-05-16 | End: 2019-05-16 | Stop reason: HOSPADM

## 2019-05-16 RX ORDER — SODIUM CHLORIDE 9 MG/ML
INJECTION, SOLUTION INTRAVENOUS CONTINUOUS
Status: DISCONTINUED | OUTPATIENT
Start: 2019-05-16 | End: 2019-05-16 | Stop reason: HOSPADM

## 2019-05-16 RX ADMIN — SODIUM CHLORIDE: 9 INJECTION, SOLUTION INTRAVENOUS at 13:32

## 2019-05-16 RX ADMIN — DEXTROSE MONOHYDRATE 1 G: 5 INJECTION INTRAVENOUS at 13:32

## 2019-05-16 NOTE — PROGRESS NOTES
Patient is resting in bed with family at the bedside. Offered something to drink. Dr. Lalo Murray talked with the patient and family.

## 2019-05-16 NOTE — PROGRESS NOTES
Patient will ambulate on the unit, return to cart and remove saline lock. Will give discharge instructions and verbalize understanding.

## 2019-05-17 NOTE — OP NOTE
[address]                      [facility], [address] [facility]                                OPERATIVE REPORT    PATIENT NAME: Ho Fisher                      :        1945  MED REC NO:   [id number]                            ROOM:  ACCOUNT NO:   [de-identified]                           ADMIT DATE: 2019  PROVIDER:     Nereida Harris MD    DATE OF PROCEDURE:  2019    OPERATION PERFORMED:  Removal of loop recorder. PATIENT'S HISTORY:  Please refer to the history and physical on the  patient's medical chart. History of loop recorder implanted for syncope  and palpitations. So far the patient is doing well. The patient  requests removal of her loop recorder. PROCEDURE NARRATIVE:  The risks, benefits, and possible complications  were explained to the patient and informed consent was obtained. The  patient was in a fasting state. A grounding pad was placed. Self-adhesive anterior and posterior defibrillation pads were applied. Defibrillator waveform was set to biphasic. The patient was setup for  continuous monitoring of 12-lead EKG and pulse oximetry. Blood pressure  was monitored. The procedure was performed under IV conscious sedation. The upper chest was prepped and draped in the usual sterile fashion. After preoperative IV antibiotic was completely infused, subcutaneous  tissues just around the device pocket were infiltrated with lidocaine 1%  for local anesthesia. A small incision was made with a #15 scalpel. The loop recorder was removed with traction and counter traction. The  skin was closed with Dermabond. Pressure dressing was applied. The  patient left the EP laboratory in stable condition. COMPLICATIONS:  The patient tolerated the procedure well without any  complications or incidents. No complications occurred. SUMMARY:  Successful removal of a loop recorder. FOLLOWUP:  1.   The patient will be seen in the office in 7 days for wound  assessment. 2.  The patient was instructed accordingly for bleeding, swelling, or  signs of infection.         Sangeeta Lin MD    D: 05/16/2019 14:24:52       T: 05/16/2019 15:06:21     [email or web address]  Job#: [id number]     Doc#: [id number]    CC:

## 2019-05-20 ENCOUNTER — HOSPITAL ENCOUNTER (OUTPATIENT)
Dept: PREADMISSION TESTING | Age: 74
Discharge: HOME | End: 2019-05-24
Payer: MEDICARE

## 2019-05-20 VITALS
RESPIRATION RATE: 22 BRPM | HEART RATE: 58 BPM | DIASTOLIC BLOOD PRESSURE: 87 MMHG | TEMPERATURE: 97.7 F | BODY MASS INDEX: 38.05 KG/M2 | SYSTOLIC BLOOD PRESSURE: 163 MMHG | WEIGHT: 228.4 LBS | HEIGHT: 65 IN | OXYGEN SATURATION: 98 %

## 2019-05-20 LAB
ANION GAP SERPL CALCULATED.3IONS-SCNC: 15 MEQ/L (ref 9–15)
BUN BLDV-MCNC: 15 MG/DL (ref 8–23)
CALCIUM SERPL-MCNC: 9.2 MG/DL (ref 8.5–9.9)
CHLORIDE BLD-SCNC: 101 MEQ/L (ref 95–107)
CO2: 25 MEQ/L (ref 20–31)
CREAT SERPL-MCNC: 0.75 MG/DL (ref 0.5–0.9)
EKG ATRIAL RATE: 57 BPM
EKG P AXIS: 62 DEGREES
EKG P-R INTERVAL: 138 MS
EKG Q-T INTERVAL: 470 MS
EKG QRS DURATION: 76 MS
EKG QTC CALCULATION (BAZETT): 457 MS
EKG R AXIS: -3 DEGREES
EKG T AXIS: 30 DEGREES
EKG VENTRICULAR RATE: 57 BPM
GFR AFRICAN AMERICAN: >60
GFR NON-AFRICAN AMERICAN: >60
GLUCOSE BLD-MCNC: 108 MG/DL (ref 70–99)
HCT VFR BLD CALC: 37.5 % (ref 37–47)
HEMOGLOBIN: 12.8 G/DL (ref 12–16)
MCH RBC QN AUTO: 30.7 PG (ref 27–31.3)
MCHC RBC AUTO-ENTMCNC: 34.3 % (ref 33–37)
MCV RBC AUTO: 89.6 FL (ref 82–100)
PDW BLD-RTO: 13.9 % (ref 11.5–14.5)
PLATELET # BLD: 192 K/UL (ref 130–400)
POTASSIUM SERPL-SCNC: 3.9 MEQ/L (ref 3.4–4.9)
RBC # BLD: 4.19 M/UL (ref 4.2–5.4)
SODIUM BLD-SCNC: 141 MEQ/L (ref 135–144)
WBC # BLD: 6.6 K/UL (ref 4.8–10.8)

## 2019-05-20 PROCEDURE — 85027 COMPLETE CBC AUTOMATED: CPT

## 2019-05-20 PROCEDURE — 80048 BASIC METABOLIC PNL TOTAL CA: CPT

## 2019-05-20 PROCEDURE — 93005 ELECTROCARDIOGRAM TRACING: CPT

## 2019-05-20 RX ORDER — SODIUM CHLORIDE, SODIUM LACTATE, POTASSIUM CHLORIDE, CALCIUM CHLORIDE 600; 310; 30; 20 MG/100ML; MG/100ML; MG/100ML; MG/100ML
INJECTION, SOLUTION INTRAVENOUS CONTINUOUS
Status: CANCELLED | OUTPATIENT
Start: 2019-05-20

## 2019-05-20 RX ORDER — SODIUM CHLORIDE 0.9 % (FLUSH) 0.9 %
10 SYRINGE (ML) INJECTION EVERY 12 HOURS SCHEDULED
Status: CANCELLED | OUTPATIENT
Start: 2019-05-20

## 2019-05-20 RX ORDER — SODIUM CHLORIDE 0.9 % (FLUSH) 0.9 %
10 SYRINGE (ML) INJECTION PRN
Status: CANCELLED | OUTPATIENT
Start: 2019-05-20

## 2019-05-20 RX ORDER — SODIUM CHLORIDE 9 MG/ML
INJECTION, SOLUTION INTRAVENOUS CONTINUOUS
Status: CANCELLED | OUTPATIENT
Start: 2019-05-23

## 2019-05-22 ENCOUNTER — ANESTHESIA EVENT (OUTPATIENT)
Dept: OPERATING ROOM | Age: 74
End: 2019-05-22
Payer: MEDICARE

## 2019-05-22 NOTE — ANESTHESIA PRE PROCEDURE
Department of Anesthesiology  Preprocedure Note       Name:  Fili Fitzpatrick   Age:  75 y.o.  :  1945                                          MRN:  [id number]         Date:  2019      Surgeon: Ariel Greer):  Yoli Morris MD    Procedure: REPAIR UMBILICAL (N/A )  AND LEFT INGUINAL HERNIA'S, 90 MINS, LATEX FREE (Left )    Medications prior to admission:   Prior to Admission medications    Medication Sig Start Date End Date Taking? Authorizing Provider   HYDROcodone-acetaminophen (NORCO) 5-325 MG per tablet Take 0.5 tablets by mouth 2 times daily as needed for Pain for up to 30 days.  19 Yes Rich Jaime MD   carvedilol (COREG) 25 MG tablet TAKE 1 TABLET BY MOUTH TWO  TIMES DAILY WITH MEALS  Patient taking differently: Take 25 mg by mouth 2 times daily TAKE 1 TABLET BY MOUTH TWO  TIMES DAILY WITH MEALS 19  Yes Rafy Oliveros MD   sertraline (ZOLOFT) 100 MG tablet TAKE 1 AND 1/2 TABLETS  DAILY  Patient taking differently: Take 150 mg by mouth daily TAKE 1 AND 1/2 TABLETS  DAILY 19  Yes Rafy Oliveros MD   doxazosin (CARDURA) 4 MG tablet TAKE 1 TABLET BY MOUTH  NIGHTLY  Patient taking differently: Take 4 mg by mouth nightly TAKE 1 TABLET BY MOUTH  NIGHTLY 19  Yes aRfy Oliveros MD   ADVAIR DISKUS 500-50 MCG/DOSE diskus inhaler USE 1 PUFF EVERY 12 HOURS 18   Rafy Oliveros MD   Multiple Vitamins-Minerals (MULTIVITAMIN PO) Take by mouth daily     Historical Provider, MD       Current medications:    Current Facility-Administered Medications   Medication Dose Route Frequency Provider Last Rate Last Dose    sodium chloride flush 0.9 % injection 10 mL  10 mL Intravenous 2 times per day RUMA Baumann CNP        sodium chloride flush 0.9 % injection 10 mL  10 mL Intravenous PRN RUMA Baumann CNP        0.9 % sodium chloride infusion   Intravenous Continuous Yoli Morris  mL/hr at 19 0639      cefTRIAXone (ROCEPHIN) 1 g IVPB in 50 Encounters:   05/23/19 228 lb (103.4 kg)   05/20/19 228 lb 6.4 oz (103.6 kg)   05/15/19 227 lb 3.2 oz (103.1 kg)     Body mass index is 37.94 kg/m². CBC:   Lab Results   Component Value Date    WBC 6.6 05/20/2019    RBC 4.19 05/20/2019    RBC 4.42 05/24/2018    HGB 12.8 05/20/2019    HCT 37.5 05/20/2019    MCV 89.6 05/20/2019    RDW 13.9 05/20/2019     05/20/2019       CMP:   Lab Results   Component Value Date     05/20/2019    K 3.9 05/20/2019     05/20/2019    CO2 25 05/20/2019    BUN 15 05/20/2019    CREATININE 0.75 05/20/2019    GFRAA >60.0 05/20/2019    AGRATIO 1.3 05/24/2018    LABGLOM >60.0 05/20/2019    GLUCOSE 108 05/20/2019    GLUCOSE 107 05/24/2018    PROT 7.1 05/24/2018    CALCIUM 9.2 05/20/2019    BILITOT 0.4 05/24/2018    ALKPHOS 71 05/24/2018    AST 23 05/24/2018    ALT 12 05/24/2018       POC Tests: No results for input(s): POCGLU, POCNA, POCK, POCCL, POCBUN, POCHEMO, POCHCT in the last 72 hours.     Coags:   Lab Results   Component Value Date    PROTIME 9.9 08/04/2015    PROTIME 10.1 11/22/2011    INR 1.0 08/04/2015    APTT 33.9 08/04/2015       HCG (If Applicable): No results found for: PREGTESTUR, PREGSERUM, HCG, HCGQUANT     ABGs: No results found for: PHART, PO2ART, MLU3OYJ, RGB8QFO, BEART, L1YOTXAN     Type & Screen (If Applicable):  No results found for: LABABO, [address]    Anesthesia Evaluation  Patient summary reviewed and Nursing notes reviewed no history of anesthetic complications:   Airway: Mallampati: II  TM distance: >3 FB   Neck ROM: full  Mouth opening: > = 3 FB Dental: normal exam         Pulmonary:Negative Pulmonary ROS and normal exam  breath sounds clear to auscultation                             Cardiovascular:  Exercise tolerance: good (>4 METS),   (+) hypertension:, dysrhythmias: atrial fibrillation, hyperlipidemia      ECG reviewed  Rhythm: regular  Rate: normal           Beta Blocker:  Dose within 24 Hrs      ROS comment: Sinus bradycardia  Otherwise

## 2019-05-23 ENCOUNTER — ANESTHESIA (OUTPATIENT)
Dept: OPERATING ROOM | Age: 74
End: 2019-05-23
Payer: MEDICARE

## 2019-05-23 ENCOUNTER — HOSPITAL ENCOUNTER (OUTPATIENT)
Age: 74
Setting detail: OUTPATIENT SURGERY
Discharge: HOME | End: 2019-05-23
Attending: SURGERY | Admitting: SURGERY
Payer: MEDICARE

## 2019-05-23 VITALS — TEMPERATURE: 97.7 F | SYSTOLIC BLOOD PRESSURE: 238 MMHG | DIASTOLIC BLOOD PRESSURE: 102 MMHG | OXYGEN SATURATION: 95 %

## 2019-05-23 VITALS
OXYGEN SATURATION: 98 % | TEMPERATURE: 98.5 F | DIASTOLIC BLOOD PRESSURE: 63 MMHG | HEART RATE: 88 BPM | RESPIRATION RATE: 16 BRPM | BODY MASS INDEX: 37.99 KG/M2 | WEIGHT: 228 LBS | SYSTOLIC BLOOD PRESSURE: 133 MMHG | HEIGHT: 65 IN

## 2019-05-23 DIAGNOSIS — G89.18 POST-OP PAIN: ICD-10-CM

## 2019-05-23 DIAGNOSIS — K40.90 LEFT INGUINAL HERNIA: ICD-10-CM

## 2019-05-23 DIAGNOSIS — K42.0 UMBILICAL HERNIA WITH OBSTRUCTION, WITHOUT GANGRENE: Primary | ICD-10-CM

## 2019-05-23 PROCEDURE — 6370000000 HC RX 637 (ALT 250 FOR IP): Performed by: STUDENT IN AN ORGANIZED HEALTH CARE EDUCATION/TRAINING PROGRAM

## 2019-05-23 PROCEDURE — 94640 AIRWAY INHALATION TREATMENT: CPT

## 2019-05-23 PROCEDURE — 2500000003 HC RX 250 WO HCPCS: Performed by: NURSE ANESTHETIST, CERTIFIED REGISTERED

## 2019-05-23 PROCEDURE — 3600000002 HC SURGERY LEVEL 2 BASE: Performed by: SURGERY

## 2019-05-23 PROCEDURE — 6370000000 HC RX 637 (ALT 250 FOR IP): Performed by: SURGERY

## 2019-05-23 PROCEDURE — 7100000010 HC PHASE II RECOVERY - FIRST 15 MIN: Performed by: SURGERY

## 2019-05-23 PROCEDURE — 6360000002 HC RX W HCPCS: Performed by: STUDENT IN AN ORGANIZED HEALTH CARE EDUCATION/TRAINING PROGRAM

## 2019-05-23 PROCEDURE — 2580000003 HC RX 258: Performed by: SURGERY

## 2019-05-23 PROCEDURE — 7100000001 HC PACU RECOVERY - ADDTL 15 MIN: Performed by: SURGERY

## 2019-05-23 PROCEDURE — 49505 PRP I/HERN INIT REDUC >5 YR: CPT | Performed by: SURGERY

## 2019-05-23 PROCEDURE — 7100000000 HC PACU RECOVERY - FIRST 15 MIN: Performed by: SURGERY

## 2019-05-23 PROCEDURE — 3700000001 HC ADD 15 MINUTES (ANESTHESIA): Performed by: SURGERY

## 2019-05-23 PROCEDURE — 2580000003 HC RX 258: Performed by: NURSE ANESTHETIST, CERTIFIED REGISTERED

## 2019-05-23 PROCEDURE — 76942 ECHO GUIDE FOR BIOPSY: CPT | Performed by: STUDENT IN AN ORGANIZED HEALTH CARE EDUCATION/TRAINING PROGRAM

## 2019-05-23 PROCEDURE — 6360000002 HC RX W HCPCS: Performed by: NURSE ANESTHETIST, CERTIFIED REGISTERED

## 2019-05-23 PROCEDURE — 3700000000 HC ANESTHESIA ATTENDED CARE: Performed by: SURGERY

## 2019-05-23 PROCEDURE — 7100000011 HC PHASE II RECOVERY - ADDTL 15 MIN: Performed by: SURGERY

## 2019-05-23 PROCEDURE — 6360000002 HC RX W HCPCS: Performed by: SURGERY

## 2019-05-23 PROCEDURE — 2709999900 HC NON-CHARGEABLE SUPPLY: Performed by: SURGERY

## 2019-05-23 PROCEDURE — 94760 N-INVAS EAR/PLS OXIMETRY 1: CPT

## 2019-05-23 PROCEDURE — 49587 REPAIR UMBILICAL HERN,5+Y/O,STRANG: CPT | Performed by: SURGERY

## 2019-05-23 PROCEDURE — 6360000002 HC RX W HCPCS: Performed by: ANESTHESIOLOGY

## 2019-05-23 PROCEDURE — 3600000012 HC SURGERY LEVEL 2 ADDTL 15MIN: Performed by: SURGERY

## 2019-05-23 RX ORDER — DEXAMETHASONE SODIUM PHOSPHATE 10 MG/ML
INJECTION INTRAMUSCULAR; INTRAVENOUS PRN
Status: DISCONTINUED | OUTPATIENT
Start: 2019-05-23 | End: 2019-05-23 | Stop reason: SDUPTHER

## 2019-05-23 RX ORDER — MORPHINE SULFATE 2 MG/ML
2 INJECTION, SOLUTION INTRAMUSCULAR; INTRAVENOUS
Status: DISCONTINUED | OUTPATIENT
Start: 2019-05-23 | End: 2019-05-23 | Stop reason: HOSPADM

## 2019-05-23 RX ORDER — WOUND DRESSING ADHESIVE - LIQUID
LIQUID MISCELLANEOUS PRN
Status: DISCONTINUED | OUTPATIENT
Start: 2019-05-23 | End: 2019-05-23 | Stop reason: ALTCHOICE

## 2019-05-23 RX ORDER — FENTANYL CITRATE 50 UG/ML
INJECTION, SOLUTION INTRAMUSCULAR; INTRAVENOUS PRN
Status: DISCONTINUED | OUTPATIENT
Start: 2019-05-23 | End: 2019-05-23 | Stop reason: SDUPTHER

## 2019-05-23 RX ORDER — ALBUTEROL SULFATE 2.5 MG/3ML
SOLUTION RESPIRATORY (INHALATION)
Status: DISCONTINUED
Start: 2019-05-23 | End: 2019-05-23 | Stop reason: HOSPADM

## 2019-05-23 RX ORDER — HYDRALAZINE HYDROCHLORIDE 20 MG/ML
INJECTION INTRAMUSCULAR; INTRAVENOUS PRN
Status: DISCONTINUED | OUTPATIENT
Start: 2019-05-23 | End: 2019-05-23 | Stop reason: SDUPTHER

## 2019-05-23 RX ORDER — SODIUM CHLORIDE 0.9 % (FLUSH) 0.9 %
10 SYRINGE (ML) INJECTION PRN
Status: DISCONTINUED | OUTPATIENT
Start: 2019-05-23 | End: 2019-05-23 | Stop reason: HOSPADM

## 2019-05-23 RX ORDER — SODIUM CHLORIDE 0.9 % (FLUSH) 0.9 %
10 SYRINGE (ML) INJECTION EVERY 12 HOURS SCHEDULED
Status: DISCONTINUED | OUTPATIENT
Start: 2019-05-23 | End: 2019-05-23 | Stop reason: HOSPADM

## 2019-05-23 RX ORDER — HYDROCODONE BITARTRATE AND ACETAMINOPHEN 5; 325 MG/1; MG/1
2 TABLET ORAL PRN
Status: COMPLETED | OUTPATIENT
Start: 2019-05-23 | End: 2019-05-23

## 2019-05-23 RX ORDER — OXYCODONE HYDROCHLORIDE AND ACETAMINOPHEN 5; 325 MG/1; MG/1
1 TABLET ORAL EVERY 6 HOURS PRN
Qty: 28 TABLET | Refills: 0 | Status: SHIPPED | OUTPATIENT
Start: 2019-05-23 | End: 2019-05-30

## 2019-05-23 RX ORDER — LIDOCAINE HYDROCHLORIDE 20 MG/ML
INJECTION, SOLUTION INTRAVENOUS PRN
Status: DISCONTINUED | OUTPATIENT
Start: 2019-05-23 | End: 2019-05-23 | Stop reason: SDUPTHER

## 2019-05-23 RX ORDER — PROPOFOL 10 MG/ML
INJECTION, EMULSION INTRAVENOUS PRN
Status: DISCONTINUED | OUTPATIENT
Start: 2019-05-23 | End: 2019-05-23 | Stop reason: SDUPTHER

## 2019-05-23 RX ORDER — HYDROCODONE BITARTRATE AND ACETAMINOPHEN 5; 325 MG/1; MG/1
1 TABLET ORAL PRN
Status: COMPLETED | OUTPATIENT
Start: 2019-05-23 | End: 2019-05-23

## 2019-05-23 RX ORDER — OXYCODONE HYDROCHLORIDE AND ACETAMINOPHEN 5; 325 MG/1; MG/1
2 TABLET ORAL EVERY 4 HOURS PRN
Status: DISCONTINUED | OUTPATIENT
Start: 2019-05-23 | End: 2019-05-23 | Stop reason: HOSPADM

## 2019-05-23 RX ORDER — MORPHINE SULFATE 4 MG/ML
4 INJECTION, SOLUTION INTRAMUSCULAR; INTRAVENOUS
Status: DISCONTINUED | OUTPATIENT
Start: 2019-05-23 | End: 2019-05-23 | Stop reason: HOSPADM

## 2019-05-23 RX ORDER — ROCURONIUM BROMIDE 10 MG/ML
INJECTION, SOLUTION INTRAVENOUS PRN
Status: DISCONTINUED | OUTPATIENT
Start: 2019-05-23 | End: 2019-05-23 | Stop reason: SDUPTHER

## 2019-05-23 RX ORDER — DIPHENHYDRAMINE HYDROCHLORIDE 50 MG/ML
12.5 INJECTION INTRAMUSCULAR; INTRAVENOUS
Status: DISCONTINUED | OUTPATIENT
Start: 2019-05-23 | End: 2019-05-23 | Stop reason: HOSPADM

## 2019-05-23 RX ORDER — METOCLOPRAMIDE HYDROCHLORIDE 5 MG/ML
10 INJECTION INTRAMUSCULAR; INTRAVENOUS
Status: DISCONTINUED | OUTPATIENT
Start: 2019-05-23 | End: 2019-05-23 | Stop reason: HOSPADM

## 2019-05-23 RX ORDER — OXYCODONE HYDROCHLORIDE AND ACETAMINOPHEN 5; 325 MG/1; MG/1
1 TABLET ORAL EVERY 4 HOURS PRN
Status: DISCONTINUED | OUTPATIENT
Start: 2019-05-23 | End: 2019-05-23 | Stop reason: HOSPADM

## 2019-05-23 RX ORDER — ONDANSETRON 2 MG/ML
4 INJECTION INTRAMUSCULAR; INTRAVENOUS EVERY 6 HOURS PRN
Status: DISCONTINUED | OUTPATIENT
Start: 2019-05-23 | End: 2019-05-23 | Stop reason: HOSPADM

## 2019-05-23 RX ORDER — ALBUTEROL SULFATE 2.5 MG/3ML
2.5 SOLUTION RESPIRATORY (INHALATION) ONCE
Status: COMPLETED | OUTPATIENT
Start: 2019-05-23 | End: 2019-05-23

## 2019-05-23 RX ORDER — ROPIVACAINE HYDROCHLORIDE 5 MG/ML
INJECTION, SOLUTION EPIDURAL; INFILTRATION; PERINEURAL
Status: DISCONTINUED | OUTPATIENT
Start: 2019-05-23 | End: 2019-05-23 | Stop reason: SDUPTHER

## 2019-05-23 RX ORDER — ONDANSETRON 2 MG/ML
4 INJECTION INTRAMUSCULAR; INTRAVENOUS
Status: COMPLETED | OUTPATIENT
Start: 2019-05-23 | End: 2019-05-23

## 2019-05-23 RX ORDER — SODIUM CHLORIDE, SODIUM LACTATE, POTASSIUM CHLORIDE, CALCIUM CHLORIDE 600; 310; 30; 20 MG/100ML; MG/100ML; MG/100ML; MG/100ML
INJECTION, SOLUTION INTRAVENOUS CONTINUOUS PRN
Status: DISCONTINUED | OUTPATIENT
Start: 2019-05-23 | End: 2019-05-23 | Stop reason: SDUPTHER

## 2019-05-23 RX ORDER — MEPERIDINE HYDROCHLORIDE 25 MG/ML
12.5 INJECTION INTRAMUSCULAR; INTRAVENOUS; SUBCUTANEOUS EVERY 5 MIN PRN
Status: DISCONTINUED | OUTPATIENT
Start: 2019-05-23 | End: 2019-05-23 | Stop reason: HOSPADM

## 2019-05-23 RX ORDER — FENTANYL CITRATE 50 UG/ML
50 INJECTION, SOLUTION INTRAMUSCULAR; INTRAVENOUS EVERY 10 MIN PRN
Status: DISCONTINUED | OUTPATIENT
Start: 2019-05-23 | End: 2019-05-23 | Stop reason: HOSPADM

## 2019-05-23 RX ORDER — SODIUM CHLORIDE 9 MG/ML
INJECTION, SOLUTION INTRAVENOUS CONTINUOUS
Status: DISCONTINUED | OUTPATIENT
Start: 2019-05-23 | End: 2019-05-23 | Stop reason: HOSPADM

## 2019-05-23 RX ORDER — MAGNESIUM HYDROXIDE 1200 MG/15ML
LIQUID ORAL CONTINUOUS PRN
Status: COMPLETED | OUTPATIENT
Start: 2019-05-23 | End: 2019-05-23

## 2019-05-23 RX ADMIN — SODIUM CHLORIDE: 9 INJECTION, SOLUTION INTRAVENOUS at 09:43

## 2019-05-23 RX ADMIN — SODIUM CHLORIDE, POTASSIUM CHLORIDE, SODIUM LACTATE AND CALCIUM CHLORIDE: 600; 310; 30; 20 INJECTION, SOLUTION INTRAVENOUS at 07:28

## 2019-05-23 RX ADMIN — ROCURONIUM BROMIDE 50 MG: 10 INJECTION INTRAVENOUS at 07:34

## 2019-05-23 RX ADMIN — ALBUTEROL SULFATE 2.5 MG: 2.5 SOLUTION RESPIRATORY (INHALATION) at 08:52

## 2019-05-23 RX ADMIN — LIDOCAINE HYDROCHLORIDE 100 MG: 20 INJECTION, SOLUTION INTRAVENOUS at 07:34

## 2019-05-23 RX ADMIN — FENTANYL CITRATE 25 MCG: 50 INJECTION, SOLUTION INTRAMUSCULAR; INTRAVENOUS at 08:52

## 2019-05-23 RX ADMIN — SUGAMMADEX 200 MG: 100 INJECTION, SOLUTION INTRAVENOUS at 08:36

## 2019-05-23 RX ADMIN — DEXAMETHASONE SODIUM PHOSPHATE 10 MG: 10 INJECTION INTRAMUSCULAR; INTRAVENOUS at 07:40

## 2019-05-23 RX ADMIN — ONDANSETRON 4 MG: 2 INJECTION INTRAMUSCULAR; INTRAVENOUS at 08:26

## 2019-05-23 RX ADMIN — ROPIVACAINE HYDROCHLORIDE 30 ML: 5 INJECTION, SOLUTION EPIDURAL; INFILTRATION; PERINEURAL at 09:34

## 2019-05-23 RX ADMIN — ALBUTEROL SULFATE 2.5 MG: 2.5 SOLUTION RESPIRATORY (INHALATION) at 11:18

## 2019-05-23 RX ADMIN — CEFTRIAXONE SODIUM 1 G: 1 INJECTION, POWDER, FOR SOLUTION INTRAMUSCULAR; INTRAVENOUS at 07:38

## 2019-05-23 RX ADMIN — HYDRALAZINE HYDROCHLORIDE 10 MG: 20 INJECTION INTRAMUSCULAR; INTRAVENOUS at 08:53

## 2019-05-23 RX ADMIN — FENTANYL CITRATE 50 MCG: 50 INJECTION, SOLUTION INTRAMUSCULAR; INTRAVENOUS at 07:34

## 2019-05-23 RX ADMIN — SODIUM CHLORIDE: 9 INJECTION, SOLUTION INTRAVENOUS at 06:39

## 2019-05-23 RX ADMIN — FENTANYL CITRATE 25 MCG: 50 INJECTION, SOLUTION INTRAMUSCULAR; INTRAVENOUS at 08:55

## 2019-05-23 RX ADMIN — PROPOFOL 200 MG: 10 INJECTION, EMULSION INTRAVENOUS at 07:34

## 2019-05-23 RX ADMIN — HYDROCODONE BITARTRATE AND ACETAMINOPHEN 1 TABLET: 5; 325 TABLET ORAL at 10:29

## 2019-05-23 ASSESSMENT — PULMONARY FUNCTION TESTS
PIF_VALUE: 22
PIF_VALUE: 16
PIF_VALUE: 24
PIF_VALUE: 1
PIF_VALUE: 22
PIF_VALUE: 23
PIF_VALUE: 11
PIF_VALUE: 2
PIF_VALUE: 23
PIF_VALUE: 4
PIF_VALUE: 23
PIF_VALUE: 24
PIF_VALUE: 22
PIF_VALUE: 20
PIF_VALUE: 20
PIF_VALUE: 22
PIF_VALUE: 23
PIF_VALUE: 23
PIF_VALUE: 21
PIF_VALUE: 2
PIF_VALUE: 0
PIF_VALUE: 0
PIF_VALUE: 24
PIF_VALUE: 36
PIF_VALUE: 24
PIF_VALUE: 19
PIF_VALUE: 23
PIF_VALUE: 23
PIF_VALUE: 16
PIF_VALUE: 3
PIF_VALUE: 22
PIF_VALUE: 21
PIF_VALUE: 22
PIF_VALUE: 24
PIF_VALUE: 29
PIF_VALUE: 28
PIF_VALUE: 25
PIF_VALUE: 3
PIF_VALUE: 23
PIF_VALUE: 0
PIF_VALUE: 25
PIF_VALUE: 32
PIF_VALUE: 23
PIF_VALUE: 21
PIF_VALUE: 5
PIF_VALUE: 24
PIF_VALUE: 22
PIF_VALUE: 2
PIF_VALUE: 22
PIF_VALUE: 23
PIF_VALUE: 22
PIF_VALUE: 21
PIF_VALUE: 22
PIF_VALUE: 22
PIF_VALUE: 24
PIF_VALUE: 21
PIF_VALUE: 22
PIF_VALUE: 4
PIF_VALUE: 15
PIF_VALUE: 24
PIF_VALUE: 22
PIF_VALUE: 15
PIF_VALUE: 2
PIF_VALUE: 23
PIF_VALUE: 23
PIF_VALUE: 22
PIF_VALUE: 20
PIF_VALUE: 23
PIF_VALUE: 16
PIF_VALUE: 24
PIF_VALUE: 25
PIF_VALUE: 24
PIF_VALUE: 21
PIF_VALUE: 23
PIF_VALUE: 23
PIF_VALUE: 22
PIF_VALUE: 20
PIF_VALUE: 30
PIF_VALUE: 23
PIF_VALUE: 22
PIF_VALUE: 23
PIF_VALUE: 25
PIF_VALUE: 23
PIF_VALUE: 43
PIF_VALUE: 25
PIF_VALUE: 37
PIF_VALUE: 22
PIF_VALUE: 22

## 2019-05-23 ASSESSMENT — PAIN - FUNCTIONAL ASSESSMENT: PAIN_FUNCTIONAL_ASSESSMENT: 0-10

## 2019-05-23 ASSESSMENT — PAIN SCALES - GENERAL
PAINLEVEL_OUTOF10: 0
PAINLEVEL_OUTOF10: 5

## 2019-05-23 NOTE — ADDENDUM NOTE
Addendum  created 05/23/19 6160 by Paul Plunkett, DO    Child order released for a procedure order, Intraprocedure Blocks edited, Sign clinical note

## 2019-05-23 NOTE — PROGRESS NOTES
Pt voices no pain. Very drowsy and o2 added at 3L and enc deep breaths. Will monitor.   Abd binder applied and ice packs as ordered

## 2019-05-23 NOTE — H&P
See attached note from the office. I have reviewed the history and physical and examined the patient. I find no relevant changes. I have reviewed with the patient and/or family members, during the preoperative office visit the risks, benefits, and alternatives to the procedure.     Maricruz Lawler MD

## 2019-05-23 NOTE — OP NOTE
[address] [place] [address], [address] [facility]                                OPERATIVE REPORT    PATIENT NAME: Chiqui Valverde                      :        1945  MED REC NO:   [id number]                            ROOM:  ACCOUNT NO:   [de-identified]                           ADMIT DATE: 2019  PROVIDER:     Annie Velasquez MD    DATE OF PROCEDURE:  2019    PREOPERATIVE DIAGNOSES:  Reducible left inguinal hernia, reducible  umbilical hernia. POSTOPERATIVE DIAGNOSES:  Reducible left inguinal hernia, reducible  umbilical hernia. PROCEDURES PERFORMED:  Primary repair of left inguinal hernia and  umbilical hernia. SURGEON:  Annie Velasquez MD.    ANESTHESIA:  General endotracheal.    CLINICAL NOTE:  This woman was referred because of left groin pain. Imaging confirmed the presence of a fat-filled left inguinal hernia as  well as a fat-filled umbilical hernia. She desires repair of both  hernia sites. Details of the surgical procedure were reviewed with her. Risks of infection, bleeding, bowel injury, recurrent hernia, etc. were  discussed. Her questions were answered, and she is agreeable to  proceed. OPERATIVE REPORT:  The patient is brought to surgery and placed supine. Flowtrons were applied to the lower extremities. The entire abdomen was  widely prepped with Betadine, and she was sterilely draped. I began  with repair of the left inguinal hernia. A transverse incision was made  parallel to the inguinal ligament. This was carried down through a very  generous subcutaneous fat layer. A Weitlaner was set up for retraction. The patient's abdominal pannus needed to be retracted superiorly by the  first assist throughout the case. The fascia of the external oblique  and inguinal canal was identified and cleared. There was fat protruding  from the external ring.   The round ligament was quite thin and flimsy  and was divided using clamps and ligated with 3-0 Vicryl ties. The  external oblique was then opened along its fibers. These were splayed  medial and lateral.  The ilioinguinal nerve was identified, tagged, and  held in a protected position throughout the case. The hernia sac was  then lifted and  out and appeared to be an indirect sac. It  was inverted and a high ligation done using 0 Vicryl. The floor of the  inguinal canal was then imbricated using 0-Prolene in an interrupted  figure-of-eight manner. The floor of the inguinal canal was actually  fairly sturdy. She was rinsed. The external oblique was reapproximated  using 3-0 Vicryl continuous and the skin was closed in layers using 3-0  Vicryl interrupted deep and 4-0 Vicryl in a subcuticular manner on the  skin. We then moved to the umbilicus. A transverse incision was made  just inferior to the umbilicus. This was carried down through skin and  subcutaneous tissue. The fascia was identified. Handheld tractors were  used. The umbilical sac was skeletonized and opened, it contained only  fat. This was debrided back to the level of the fascia. There was some  bleeding from the edges of the fascia and this was controlled with 3-0  Vicryl sutures. The hernia defect was small on the order of 1 cm and  was closed primarily using 0-Prolene in an interrupted figure-of-eight  manner. The umbilical skin was tacked to the repair. The skin incision  was closed in layers using 3-0 Vicryl interrupted deep and 4-0 Vicryl in  a subcuticular manner on the skin. She was dressed with Steri-Strips,  gauze and tape. Blood loss was minimal.  She was extubated and taken to  the recovery room. The operative details were discussed with her   and children.       Carol Marin MD    D: 05/23/2019 11:34:27       T: 05/23/2019 11:42:28     [email or web address]  Job#: [id number]     Doc#: [id number]    CC:

## 2019-05-24 PROCEDURE — 93010 ELECTROCARDIOGRAM REPORT: CPT | Performed by: INTERNAL MEDICINE

## 2019-05-31 ENCOUNTER — OFFICE VISIT (OUTPATIENT)
Dept: SURGERY | Age: 74
End: 2019-05-31

## 2019-05-31 VITALS
SYSTOLIC BLOOD PRESSURE: 130 MMHG | DIASTOLIC BLOOD PRESSURE: 80 MMHG | HEIGHT: 65 IN | BODY MASS INDEX: 37.65 KG/M2 | WEIGHT: 226 LBS | TEMPERATURE: 97.2 F

## 2019-05-31 DIAGNOSIS — Z09 SURGICAL FOLLOW-UP CARE: Primary | ICD-10-CM

## 2019-05-31 PROBLEM — K42.0 UMBILICAL HERNIA WITH OBSTRUCTION, WITHOUT GANGRENE: Status: RESOLVED | Noted: 2019-05-15 | Resolved: 2019-05-31

## 2019-05-31 PROBLEM — K40.90 LEFT INGUINAL HERNIA: Status: RESOLVED | Noted: 2019-05-15 | Resolved: 2019-05-31

## 2019-05-31 PROCEDURE — 99024 POSTOP FOLLOW-UP VISIT: CPT | Performed by: SURGERY

## 2019-06-03 RX ORDER — EPINEPHRINE 0.3 MG/.3ML
0.3 INJECTION SUBCUTANEOUS ONCE
Qty: 0.3 ML | Refills: 0 | Status: SHIPPED | OUTPATIENT
Start: 2019-06-03 | End: 2021-12-15

## 2019-06-03 NOTE — TELEPHONE ENCOUNTER
Ayla Carmen is requesting medication refill. Rx requested:  Requested Prescriptions     Pending Prescriptions Disp Refills    EPINEPHrine (EPIPEN 2-DE) 0.3 MG/0.3ML SOAJ injection 0.3 mL 0     Sig: Inject 0.3 mLs into the muscle once for 1 dose Use as directed for allergic reaction.  Please give generic       Last Office Visit:   5/1/2019        Next Visit Date:  Future Appointments   Date Time Provider Guero Bradley   6/14/2019  9:30 AM Demetrice Drake  [address]

## 2019-07-29 DIAGNOSIS — I10 ESSENTIAL HYPERTENSION: ICD-10-CM

## 2019-07-29 RX ORDER — DOXAZOSIN MESYLATE 4 MG/1
TABLET ORAL
Qty: 90 TABLET | Refills: 0 | Status: SHIPPED | OUTPATIENT
Start: 2019-07-29 | End: 2019-10-24 | Stop reason: SDUPTHER

## 2019-07-29 RX ORDER — CARVEDILOL 25 MG/1
TABLET ORAL
Qty: 180 TABLET | Refills: 0 | Status: SHIPPED | OUTPATIENT
Start: 2019-07-29 | End: 2019-10-24 | Stop reason: SDUPTHER

## 2019-08-07 ENCOUNTER — TELEPHONE (OUTPATIENT)
Dept: FAMILY MEDICINE CLINIC | Age: 74
End: 2019-08-07

## 2019-08-07 DIAGNOSIS — F32.A ANXIETY AND DEPRESSION: ICD-10-CM

## 2019-08-07 DIAGNOSIS — F41.9 ANXIETY AND DEPRESSION: ICD-10-CM

## 2019-08-07 RX ORDER — SERTRALINE HYDROCHLORIDE 100 MG/1
TABLET, FILM COATED ORAL
Qty: 135 TABLET | Refills: 0 | Status: SHIPPED | OUTPATIENT
Start: 2019-08-07 | End: 2019-08-13 | Stop reason: SDUPTHER

## 2019-08-12 NOTE — TELEPHONE ENCOUNTER
[facility] mail order sent this request but it was sent to [facility] instead. The patient no longer uses [facility], can this be resent over to [facility] mail order pharmacy (I did take out [facility] as a preferred pharmacy)?

## 2019-08-13 DIAGNOSIS — F41.9 ANXIETY AND DEPRESSION: ICD-10-CM

## 2019-08-13 DIAGNOSIS — F32.A ANXIETY AND DEPRESSION: ICD-10-CM

## 2019-08-13 RX ORDER — SERTRALINE HYDROCHLORIDE 100 MG/1
TABLET, FILM COATED ORAL
Qty: 135 TABLET | Refills: 0 | Status: SHIPPED | OUTPATIENT
Start: 2019-08-13 | End: 2019-10-24 | Stop reason: SDUPTHER

## 2019-08-24 ENCOUNTER — HOSPITAL ENCOUNTER (OUTPATIENT)
Dept: MRI IMAGING | Age: 74
Discharge: HOME | End: 2019-08-26
Payer: MEDICARE

## 2019-08-24 DIAGNOSIS — M47.817 SPONDYLOSIS OF LUMBOSACRAL JOINT WITHOUT MYELOPATHY: ICD-10-CM

## 2019-08-24 DIAGNOSIS — G89.4 CHRONIC PAIN SYNDROME: ICD-10-CM

## 2019-08-24 PROCEDURE — 72148 MRI LUMBAR SPINE W/O DYE: CPT

## 2019-10-17 ENCOUNTER — OFFICE VISIT (OUTPATIENT)
Dept: FAMILY MEDICINE CLINIC | Age: 74
End: 2019-10-17
Payer: MEDICARE

## 2019-10-17 VITALS
WEIGHT: 222.8 LBS | TEMPERATURE: 96.8 F | HEART RATE: 78 BPM | BODY MASS INDEX: 37.12 KG/M2 | SYSTOLIC BLOOD PRESSURE: 130 MMHG | DIASTOLIC BLOOD PRESSURE: 72 MMHG | HEIGHT: 65 IN | OXYGEN SATURATION: 96 %

## 2019-10-17 DIAGNOSIS — J40 BRONCHITIS: Primary | ICD-10-CM

## 2019-10-17 DIAGNOSIS — R05.9 COUGH: ICD-10-CM

## 2019-10-17 PROCEDURE — 94640 AIRWAY INHALATION TREATMENT: CPT | Performed by: NURSE PRACTITIONER

## 2019-10-17 PROCEDURE — 1090F PRES/ABSN URINE INCON ASSESS: CPT | Performed by: NURSE PRACTITIONER

## 2019-10-17 PROCEDURE — G8399 PT W/DXA RESULTS DOCUMENT: HCPCS | Performed by: NURSE PRACTITIONER

## 2019-10-17 PROCEDURE — 1036F TOBACCO NON-USER: CPT | Performed by: NURSE PRACTITIONER

## 2019-10-17 PROCEDURE — 4040F PNEUMOC VAC/ADMIN/RCVD: CPT | Performed by: NURSE PRACTITIONER

## 2019-10-17 PROCEDURE — 99214 OFFICE O/P EST MOD 30 MIN: CPT | Performed by: NURSE PRACTITIONER

## 2019-10-17 PROCEDURE — 1123F ACP DISCUSS/DSCN MKR DOCD: CPT | Performed by: NURSE PRACTITIONER

## 2019-10-17 PROCEDURE — 3017F COLORECTAL CA SCREEN DOC REV: CPT | Performed by: NURSE PRACTITIONER

## 2019-10-17 PROCEDURE — G8417 CALC BMI ABV UP PARAM F/U: HCPCS | Performed by: NURSE PRACTITIONER

## 2019-10-17 PROCEDURE — G8484 FLU IMMUNIZE NO ADMIN: HCPCS | Performed by: NURSE PRACTITIONER

## 2019-10-17 PROCEDURE — G8427 DOCREV CUR MEDS BY ELIG CLIN: HCPCS | Performed by: NURSE PRACTITIONER

## 2019-10-17 RX ORDER — ALBUTEROL SULFATE 2.5 MG/3ML
2.5 SOLUTION RESPIRATORY (INHALATION) ONCE
Status: COMPLETED | OUTPATIENT
Start: 2019-10-17 | End: 2019-10-17

## 2019-10-17 RX ORDER — DOXYCYCLINE HYCLATE 100 MG
100 TABLET ORAL 2 TIMES DAILY
Qty: 20 TABLET | Refills: 0 | Status: SHIPPED | OUTPATIENT
Start: 2019-10-17 | End: 2019-10-27

## 2019-10-17 RX ORDER — BENZONATATE 100 MG/1
100 CAPSULE ORAL 3 TIMES DAILY PRN
Qty: 30 CAPSULE | Refills: 0 | Status: SHIPPED | OUTPATIENT
Start: 2019-10-17 | End: 2019-10-24 | Stop reason: ALTCHOICE

## 2019-10-17 RX ORDER — ALBUTEROL SULFATE 2.5 MG/3ML
2.5 SOLUTION RESPIRATORY (INHALATION) EVERY 6 HOURS PRN
Qty: 120 EACH | Refills: 0 | Status: SHIPPED | OUTPATIENT
Start: 2019-10-17 | End: 2020-11-11 | Stop reason: SDUPTHER

## 2019-10-17 RX ORDER — ALBUTEROL SULFATE 90 UG/1
2 AEROSOL, METERED RESPIRATORY (INHALATION) EVERY 6 HOURS PRN
Qty: 1 INHALER | Refills: 0 | Status: SHIPPED | OUTPATIENT
Start: 2019-10-17 | End: 2022-10-13

## 2019-10-17 RX ADMIN — ALBUTEROL SULFATE 2.5 MG: 2.5 SOLUTION RESPIRATORY (INHALATION) at 11:51

## 2019-10-17 ASSESSMENT — ENCOUNTER SYMPTOMS
VOMITING: 0
RHINORRHEA: 1
SINUS PRESSURE: 0
COUGH: 1
SORE THROAT: 1
SHORTNESS OF BREATH: 0
WHEEZING: 1
NAUSEA: 0

## 2019-10-24 ENCOUNTER — OFFICE VISIT (OUTPATIENT)
Dept: FAMILY MEDICINE CLINIC | Age: 74
End: 2019-10-24
Payer: MEDICARE

## 2019-10-24 VITALS
OXYGEN SATURATION: 97 % | DIASTOLIC BLOOD PRESSURE: 70 MMHG | SYSTOLIC BLOOD PRESSURE: 128 MMHG | HEIGHT: 65 IN | WEIGHT: 222 LBS | BODY MASS INDEX: 36.99 KG/M2 | TEMPERATURE: 98.2 F | HEART RATE: 68 BPM

## 2019-10-24 DIAGNOSIS — F41.9 ANXIETY AND DEPRESSION: ICD-10-CM

## 2019-10-24 DIAGNOSIS — J40 BRONCHITIS: Primary | ICD-10-CM

## 2019-10-24 DIAGNOSIS — I10 ESSENTIAL HYPERTENSION: ICD-10-CM

## 2019-10-24 DIAGNOSIS — F32.A ANXIETY AND DEPRESSION: ICD-10-CM

## 2019-10-24 PROCEDURE — 4040F PNEUMOC VAC/ADMIN/RCVD: CPT | Performed by: NURSE PRACTITIONER

## 2019-10-24 PROCEDURE — G8427 DOCREV CUR MEDS BY ELIG CLIN: HCPCS | Performed by: NURSE PRACTITIONER

## 2019-10-24 PROCEDURE — G8417 CALC BMI ABV UP PARAM F/U: HCPCS | Performed by: NURSE PRACTITIONER

## 2019-10-24 PROCEDURE — G8484 FLU IMMUNIZE NO ADMIN: HCPCS | Performed by: NURSE PRACTITIONER

## 2019-10-24 PROCEDURE — G8399 PT W/DXA RESULTS DOCUMENT: HCPCS | Performed by: NURSE PRACTITIONER

## 2019-10-24 PROCEDURE — 1090F PRES/ABSN URINE INCON ASSESS: CPT | Performed by: NURSE PRACTITIONER

## 2019-10-24 PROCEDURE — 3017F COLORECTAL CA SCREEN DOC REV: CPT | Performed by: NURSE PRACTITIONER

## 2019-10-24 PROCEDURE — 1123F ACP DISCUSS/DSCN MKR DOCD: CPT | Performed by: NURSE PRACTITIONER

## 2019-10-24 PROCEDURE — 99214 OFFICE O/P EST MOD 30 MIN: CPT | Performed by: NURSE PRACTITIONER

## 2019-10-24 PROCEDURE — 1036F TOBACCO NON-USER: CPT | Performed by: NURSE PRACTITIONER

## 2019-10-24 RX ORDER — CARVEDILOL 25 MG/1
TABLET ORAL
Qty: 180 TABLET | Refills: 0 | Status: SHIPPED | OUTPATIENT
Start: 2019-10-24 | End: 2019-11-05 | Stop reason: SDUPTHER

## 2019-10-24 RX ORDER — DOXAZOSIN MESYLATE 4 MG/1
TABLET ORAL
Qty: 90 TABLET | Refills: 0 | Status: SHIPPED | OUTPATIENT
Start: 2019-10-24 | End: 2019-11-05 | Stop reason: SDUPTHER

## 2019-10-24 RX ORDER — SERTRALINE HYDROCHLORIDE 100 MG/1
TABLET, FILM COATED ORAL
Qty: 180 TABLET | Refills: 0 | Status: SHIPPED | OUTPATIENT
Start: 2019-10-24 | End: 2020-03-02 | Stop reason: SDUPTHER

## 2019-10-24 ASSESSMENT — ENCOUNTER SYMPTOMS
WHEEZING: 0
COUGH: 1
BLURRED VISION: 0
RHINORRHEA: 0
ORTHOPNEA: 0
SORE THROAT: 0
SHORTNESS OF BREATH: 0

## 2019-12-02 ENCOUNTER — OFFICE VISIT (OUTPATIENT)
Dept: FAMILY MEDICINE CLINIC | Age: 74
End: 2019-12-02
Payer: MEDICARE

## 2019-12-02 VITALS
SYSTOLIC BLOOD PRESSURE: 120 MMHG | OXYGEN SATURATION: 95 % | TEMPERATURE: 97.6 F | DIASTOLIC BLOOD PRESSURE: 80 MMHG | BODY MASS INDEX: 37.32 KG/M2 | WEIGHT: 224 LBS | HEART RATE: 87 BPM | HEIGHT: 65 IN | RESPIRATION RATE: 16 BRPM

## 2019-12-02 DIAGNOSIS — J45.40 MODERATE PERSISTENT ASTHMA WITHOUT COMPLICATION: ICD-10-CM

## 2019-12-02 DIAGNOSIS — F41.9 ANXIETY AND DEPRESSION: ICD-10-CM

## 2019-12-02 DIAGNOSIS — E78.00 PURE HYPERCHOLESTEROLEMIA: ICD-10-CM

## 2019-12-02 DIAGNOSIS — F32.A ANXIETY AND DEPRESSION: ICD-10-CM

## 2019-12-02 DIAGNOSIS — I10 ESSENTIAL HYPERTENSION: Primary | ICD-10-CM

## 2019-12-02 PROCEDURE — G8399 PT W/DXA RESULTS DOCUMENT: HCPCS | Performed by: FAMILY MEDICINE

## 2019-12-02 PROCEDURE — 1123F ACP DISCUSS/DSCN MKR DOCD: CPT | Performed by: FAMILY MEDICINE

## 2019-12-02 PROCEDURE — G8484 FLU IMMUNIZE NO ADMIN: HCPCS | Performed by: FAMILY MEDICINE

## 2019-12-02 PROCEDURE — G8427 DOCREV CUR MEDS BY ELIG CLIN: HCPCS | Performed by: FAMILY MEDICINE

## 2019-12-02 PROCEDURE — 99214 OFFICE O/P EST MOD 30 MIN: CPT | Performed by: FAMILY MEDICINE

## 2019-12-02 PROCEDURE — G8417 CALC BMI ABV UP PARAM F/U: HCPCS | Performed by: FAMILY MEDICINE

## 2019-12-02 PROCEDURE — 4040F PNEUMOC VAC/ADMIN/RCVD: CPT | Performed by: FAMILY MEDICINE

## 2019-12-02 PROCEDURE — 1090F PRES/ABSN URINE INCON ASSESS: CPT | Performed by: FAMILY MEDICINE

## 2019-12-02 PROCEDURE — 1036F TOBACCO NON-USER: CPT | Performed by: FAMILY MEDICINE

## 2019-12-02 PROCEDURE — 3017F COLORECTAL CA SCREEN DOC REV: CPT | Performed by: FAMILY MEDICINE

## 2019-12-02 ASSESSMENT — ENCOUNTER SYMPTOMS
SHORTNESS OF BREATH: 0
ABDOMINAL PAIN: 0

## 2019-12-10 LAB
ALBUMIN SERPL-MCNC: 4.1 G/DL
ALP BLD-CCNC: 79 U/L
ALT SERPL-CCNC: 14 U/L
ANION GAP SERPL CALCULATED.3IONS-SCNC: 16 MMOL/L
AST SERPL-CCNC: 25 U/L
BASOPHILS ABSOLUTE: 0.03 /ΜL
BASOPHILS RELATIVE PERCENT: 0.5 %
BILIRUB SERPL-MCNC: 0.3 MG/DL (ref 0.1–1.4)
BUN BLDV-MCNC: 10 MG/DL
CALCIUM SERPL-MCNC: 9 MG/DL
CHLORIDE BLD-SCNC: 102 MMOL/L
CHOLESTEROL, TOTAL: 204 MG/DL
CHOLESTEROL/HDL RATIO: 2.6
CO2: 25 MMOL/L
CREAT SERPL-MCNC: 0.66 MG/DL
EOSINOPHILS ABSOLUTE: 0.38 /ΜL
EOSINOPHILS RELATIVE PERCENT: 6.6 %
GFR CALCULATED: >60
GLUCOSE BLD-MCNC: 108 MG/DL
HCT VFR BLD CALC: 41 % (ref 36–46)
HDLC SERPL-MCNC: 43 MG/DL (ref 35–70)
HEMOGLOBIN: 13.4 G/DL (ref 12–16)
LDL CHOLESTEROL CALCULATED: 161 MG/DL (ref 0–160)
LYMPHOCYTES ABSOLUTE: 1.24 /ΜL
LYMPHOCYTES RELATIVE PERCENT: 21.6 %
MCH RBC QN AUTO: 29.7 PG
MCHC RBC AUTO-ENTMCNC: 32.7 G/DL
MCV RBC AUTO: 90.9 FL
MONOCYTES ABSOLUTE: 0.33 /ΜL
MONOCYTES RELATIVE PERCENT: 5.7 %
NEUTROPHILS ABSOLUTE: 3.76 /ΜL
NEUTROPHILS RELATIVE PERCENT: 65.8 %
PDW BLD-RTO: 14.1 %
PLATELET # BLD: 194 K/ΜL
PMV BLD AUTO: 10.3 FL
POTASSIUM SERPL-SCNC: 3.8 MMOL/L
RBC # BLD: 4.51 10^6/ΜL
SODIUM BLD-SCNC: 143 MMOL/L
TOTAL PROTEIN: 7.2
TRIGL SERPL-MCNC: 246 MG/DL
VLDLC SERPL CALC-MCNC: 49 MG/DL
WBC # BLD: 5.74 10^3/ML

## 2020-01-31 ENCOUNTER — TELEPHONE (OUTPATIENT)
Dept: FAMILY MEDICINE CLINIC | Age: 75
End: 2020-01-31

## 2020-03-02 ENCOUNTER — OFFICE VISIT (OUTPATIENT)
Dept: FAMILY MEDICINE CLINIC | Age: 75
End: 2020-03-02
Payer: MEDICARE

## 2020-03-02 VITALS
OXYGEN SATURATION: 95 % | DIASTOLIC BLOOD PRESSURE: 82 MMHG | HEIGHT: 65 IN | HEART RATE: 62 BPM | BODY MASS INDEX: 36.65 KG/M2 | WEIGHT: 220 LBS | SYSTOLIC BLOOD PRESSURE: 130 MMHG | RESPIRATION RATE: 14 BRPM | TEMPERATURE: 97.8 F

## 2020-03-02 VITALS
OXYGEN SATURATION: 95 % | HEART RATE: 62 BPM | TEMPERATURE: 97.8 F | BODY MASS INDEX: 36.65 KG/M2 | WEIGHT: 220 LBS | SYSTOLIC BLOOD PRESSURE: 130 MMHG | DIASTOLIC BLOOD PRESSURE: 82 MMHG | HEIGHT: 65 IN

## 2020-03-02 PROBLEM — J45.40 MODERATE PERSISTENT ASTHMA WITHOUT COMPLICATION: Status: ACTIVE | Noted: 2020-03-02

## 2020-03-02 PROBLEM — E66.01 MORBIDLY OBESE (HCC): Status: ACTIVE | Noted: 2020-03-02

## 2020-03-02 PROCEDURE — 1123F ACP DISCUSS/DSCN MKR DOCD: CPT | Performed by: FAMILY MEDICINE

## 2020-03-02 PROCEDURE — G8484 FLU IMMUNIZE NO ADMIN: HCPCS | Performed by: NURSE PRACTITIONER

## 2020-03-02 PROCEDURE — 1090F PRES/ABSN URINE INCON ASSESS: CPT | Performed by: FAMILY MEDICINE

## 2020-03-02 PROCEDURE — G8417 CALC BMI ABV UP PARAM F/U: HCPCS | Performed by: FAMILY MEDICINE

## 2020-03-02 PROCEDURE — 3017F COLORECTAL CA SCREEN DOC REV: CPT | Performed by: FAMILY MEDICINE

## 2020-03-02 PROCEDURE — 1123F ACP DISCUSS/DSCN MKR DOCD: CPT | Performed by: NURSE PRACTITIONER

## 2020-03-02 PROCEDURE — 1036F TOBACCO NON-USER: CPT | Performed by: FAMILY MEDICINE

## 2020-03-02 PROCEDURE — 4040F PNEUMOC VAC/ADMIN/RCVD: CPT | Performed by: NURSE PRACTITIONER

## 2020-03-02 PROCEDURE — 99214 OFFICE O/P EST MOD 30 MIN: CPT | Performed by: FAMILY MEDICINE

## 2020-03-02 PROCEDURE — G8510 SCR DEP NEG, NO PLAN REQD: HCPCS | Performed by: FAMILY MEDICINE

## 2020-03-02 PROCEDURE — G0439 PPPS, SUBSEQ VISIT: HCPCS | Performed by: NURSE PRACTITIONER

## 2020-03-02 PROCEDURE — 3017F COLORECTAL CA SCREEN DOC REV: CPT | Performed by: NURSE PRACTITIONER

## 2020-03-02 PROCEDURE — G8427 DOCREV CUR MEDS BY ELIG CLIN: HCPCS | Performed by: FAMILY MEDICINE

## 2020-03-02 PROCEDURE — 3288F FALL RISK ASSESSMENT DOCD: CPT | Performed by: FAMILY MEDICINE

## 2020-03-02 PROCEDURE — G8484 FLU IMMUNIZE NO ADMIN: HCPCS | Performed by: FAMILY MEDICINE

## 2020-03-02 PROCEDURE — G8399 PT W/DXA RESULTS DOCUMENT: HCPCS | Performed by: FAMILY MEDICINE

## 2020-03-02 PROCEDURE — 4040F PNEUMOC VAC/ADMIN/RCVD: CPT | Performed by: FAMILY MEDICINE

## 2020-03-02 RX ORDER — CARVEDILOL 25 MG/1
TABLET ORAL
Qty: 180 TABLET | Refills: 1 | Status: SHIPPED | OUTPATIENT
Start: 2020-03-02 | End: 2020-09-02 | Stop reason: SDUPTHER

## 2020-03-02 RX ORDER — SERTRALINE HYDROCHLORIDE 100 MG/1
TABLET, FILM COATED ORAL
Qty: 135 TABLET | Refills: 1 | Status: SHIPPED | OUTPATIENT
Start: 2020-03-02 | End: 2020-08-13

## 2020-03-02 RX ORDER — DOXAZOSIN MESYLATE 4 MG/1
TABLET ORAL
Qty: 90 TABLET | Refills: 1 | Status: SHIPPED | OUTPATIENT
Start: 2020-03-02 | End: 2020-09-02 | Stop reason: SDUPTHER

## 2020-03-02 ASSESSMENT — PATIENT HEALTH QUESTIONNAIRE - PHQ9
SUM OF ALL RESPONSES TO PHQ QUESTIONS 1-9: 0
SUM OF ALL RESPONSES TO PHQ QUESTIONS 1-9: 0
2. FEELING DOWN, DEPRESSED OR HOPELESS: 0
SUM OF ALL RESPONSES TO PHQ9 QUESTIONS 1 & 2: 0
1. LITTLE INTEREST OR PLEASURE IN DOING THINGS: 0

## 2020-03-02 ASSESSMENT — ENCOUNTER SYMPTOMS
SHORTNESS OF BREATH: 0
ABDOMINAL PAIN: 0

## 2020-03-02 ASSESSMENT — LIFESTYLE VARIABLES: HOW OFTEN DO YOU HAVE A DRINK CONTAINING ALCOHOL: 0

## 2020-03-02 NOTE — PROGRESS NOTES
Subjective:      Patient ID: Cassandra Tinoco is a 75 y.o. female. HPI  Here in follow up for htn and lipids and depression and rad. Using advair once daily    Ambulating better since knee replacement and is feeling less depressed since last visit. Would like to remain on current dose of zoloft. Review of Systems   Constitutional: Positive for activity change. Respiratory: Negative for shortness of breath. Cardiovascular: Negative for chest pain. Gastrointestinal: Negative for abdominal pain. Musculoskeletal: Positive for arthralgias. Skin: Negative for rash. Neurological: Negative for dizziness and weakness. Treatment Adherence:   Medication compliance:  compliant most of the time  Diet compliance:  compliant most of the time  Weight trend: decreasing  Current exercise: no regular exercise  Barriers: none    Hypertension:  Home blood pressure monitoring: No.  She is adherent to a low sodium diet. Patient denies chest pain. Antihypertensive medication side effects: no medication side effects noted. Use of agents associated with hypertension: none. Sodium (mmol/L)   Date Value   12/10/2019 143    BUN (mg/dL)   Date Value   12/10/2019 10    Glucose (mg/dL)   Date Value   12/10/2019 108   05/24/2018 107 (H)      Potassium (mmol/L)   Date Value   12/10/2019 3.8    CREATININE (no units)   Date Value   12/10/2019 0.66         Hyperlipidemia:  No new myalgias or GI upset on none.      Lab Results   Component Value Date    CHOL 204 12/10/2019    TRIG 246 12/10/2019    HDL 43 12/10/2019    LDLCALC 161 (A) 12/10/2019     Lab Results   Component Value Date    ALT 14 12/10/2019    AST 25 12/10/2019      Reviewed allergy, medical, social, surgical, family and med list changes and updated   Files  Social History     Socioeconomic History    Marital status:      Spouse name: None    Number of children: None    Years of education: None    Highest education

## 2020-03-02 NOTE — PATIENT INSTRUCTIONS
Personalized Preventive Plan for Tutu Sorto - 3/2/2020  Medicare offers a range of preventive health benefits. Some of the tests and screenings are paid in full while other may be subject to a deductible, co-insurance, and/or copay. Some of these benefits include a comprehensive review of your medical history including lifestyle, illnesses that may run in your family, and various assessments and screenings as appropriate. After reviewing your medical record and screening and assessments performed today your provider may have ordered immunizations, labs, imaging, and/or referrals for you. A list of these orders (if applicable) as well as your Preventive Care list are included within your After Visit Summary for your review. Other Preventive Recommendations:    · A preventive eye exam performed by an eye specialist is recommended every 1-2 years to screen for glaucoma; cataracts, macular degeneration, and other eye disorders. · A preventive dental visit is recommended every 6 months. · Try to get at least 150 minutes of exercise per week or 10,000 steps per day on a pedometer . · Order or download the FREE \"Exercise & Physical Activity: Your Everyday Guide\" from [employer]. Call 2-197.987.6441 or search [employer] online. · You need 5464-4517 mg of calcium and 8578-2125 IU of vitamin D per day. It is possible to meet your calcium requirement with diet alone, but a vitamin D supplement is usually necessary to meet this goal.  · When exposed to the sun, use a sunscreen that protects against both UVA and UVB radiation with an SPF of 30 or greater. Reapply every 2 to 3 hours or after sweating, drying off with a towel, or swimming. · Always wear a seat belt when traveling in a car. Always wear a helmet when riding a bicycle or motorcycle.

## 2020-03-02 NOTE — PROGRESS NOTES
Medicare Annual Wellness Visit  Name: Stacey Martin Date: 3/2/2020   MRN: [id number] Sex: Female   Age: 75 y.o. Ethnicity: Non-/Non    : 1945 Race: Cinthya Partida is here for Medicare AWV (Patient presents today for medicare annual wellness exam.) and Health Maintenance (Patient would like cologuard.)    Screenings for behavioral, psychosocial and functional/safety risks, and cognitive dysfunction are all negative except as indicated below. These results, as well as other patient data from the [address] form, are documented in Flowsheets linked to this Encounter. Allergies   Allergen Reactions    Floxin [Ofloxacin] Anaphylaxis    Praluent [Alirocumab] Anaphylaxis    Questran [Cholestyramine] Anaphylaxis and Swelling    Lidocaine Oint & Men-Meth Jemal Swelling    Lovaza [Omega-3-Acid Ethyl Esters]     Niacin And Related     Statins     Tobramycin      rash    Tricor [Fenofibrate]          Prior to Visit Medications    Medication Sig Taking?  Authorizing Provider   carvedilol (COREG) 25 MG tablet TAKE 1 TABLET TWICE DAILY  WITH MEALS Yes Marlena Mattson MD   sertraline (ZOLOFT) 100 MG tablet 1 and 1/2 po q day Yes Marlena Mattson MD   doxazosin (CARDURA) 4 MG tablet TAKE 1 TABLET NIGHTLY Yes Marlena Mattson MD   fluticasone-salmeterol (ADVAIR DISKUS) 500-50 MCG/DOSE diskus inhaler USE 1 PUFF EVERY 12 HOURS Yes Marlena Mattson MD   albuterol sulfate  (90 Base) MCG/ACT inhaler Inhale 2 puffs into the lungs every 6 hours as needed for Wheezing Yes RUMA Sales CNP   albuterol (PROVENTIL) (2.5 MG/3ML) 0.083% nebulizer solution Take 3 mLs by nebulization every 6 hours as needed for Wheezing Yes RUMA Sales CNP   Multiple Vitamins-Minerals (MULTIVITAMIN PO) Take by mouth daily  Yes Historical Provider, MD   HYDROcodone-acetaminophen (NORCO) 5-325 MG per tablet Take 1 tablet by mouth every 8 hours as needed for Pain for up to 14 days. Fide Paez MD   EPINEPHrine (EPIPEN 2-DE) 0.3 MG/0.3ML SOAJ injection Inject 0.3 mLs into the muscle once for 1 dose Use as directed for allergic reaction. 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    TIA (transient ischemic attack)        Past Surgical History:   Procedure Laterality Date    APPENDECTOMY      CATARACT REMOVAL      HAND SURGERY      RIGHT    HERNIA REPAIR Left 5/23/2019    primary repairs  and [facility]    TONSILLECTOMY AND ADENOIDECTOMY      UMBILICAL HERNIA REPAIR N/A 5/23/2019    Primary repair          Family History   Problem Relation Age of Onset    Cancer Mother         BREAST, COLON    High Blood Pressure Father        CareTeam (Including outside providers/suppliers regularly involved in providing care):   Patient Care Team:  Phu Heredia MD as PCP - General (Family Medicine)  Phu Heredia MD as PCP - [facility] Empaneled Provider  Fide Paez MD as Consulting Physician (Pain Management)  Cielo Collado MD as Consulting Physician (Urology)    Wt Readings from Last 3 Encounters:   03/02/20 220 lb (99.8 kg)   03/02/20 220 lb (99.8 kg)   02/11/20 220 lb (99.8 kg)     Vitals:    03/02/20 1441   BP: 130/82   Pulse: 62   Temp: 97.8 °F (36.6 °C)   TempSrc: Temporal   SpO2: 95%   Weight: 220 lb (99.8 kg)   Height: 5' 5\" (1.651 m)     Body mass index is 36.61 kg/m². Based upon direct observation of the patient, evaluation of cognition reveals recent and remote memory intact. Patient's complete Health Risk Assessment and screening values have been reviewed and are found in Flowsheets. The following problems were reviewed today and where indicated follow up appointments were made and/or referrals ordered.      Positive Risk Factor Screenings with Interventions:     Health Habits/Nutrition:  Health Habits/Nutrition  Do you exercise for at least 20 minutes 2-3 times per week?: (!) No  Have you lost any weight without trying in the past 3 months?: No  Do you eat fewer than 2 meals per day?: No  Have you seen a dentist within the past year?: Yes  Body mass index is 36.61 kg/m². Health Habits/Nutrition Interventions:  · Inadequate physical activity:  Pt recently had knee surgery. Is going to therapy regularly    Hearing/Vision:  No exam data present  Hearing/Vision  Do you or your family notice any trouble with your hearing?: (!) Yes  Do you have difficulty driving, watching TV, or doing any of your daily activities because of your eyesight?: (!) Yes  Have you had an eye exam within the past year?: (!) No  Hearing/Vision Interventions:  · Hearing concerns:  patient declines any further evaluation/treatment for hearing issues  · Vision concerns:  patient encouraged to make appointment with his/her eye specialist    Safety:  Safety  Do you have working smoke detectors?: Yes  Have all throw rugs been removed or fastened?: Yes  Do you have non-slip mats or surfaces in all bathtubs/showers?: (!) No  Do all of your stairways have a railing or banister?: Yes  Are your doorways, halls and stairs free of clutter?: Yes  Do you always fasten your seatbelt when you are in a car?: Yes  Safety Interventions:  · Home safety tips provided    ADL:  ADLs  In the past 7 days, did you need help from others to perform any of the following everyday activities? Eating, dressing, grooming, bathing, toileting, or walking/balance?: (!) Walking/Balance  In the past 7 days, did you need help from others to take care of any of the following?  Laundry, housekeeping, banking/finances, shopping, telephone use, food preparation, transportation, or taking medications?: (!) Laundry, Housekeeping  ADL Interventions:  · Patient declines any further evaluation/treatment for this issue    Personalized Preventive Plan   Current Health Maintenance Status  Immunization History   Administered Date(s) Administered    Influenza Vaccine, unspecified formulation 10/12/2015, 10/28/2016    Influenza Virus Vaccine 09/24/2014    Influenza, High Dose (Fluzone 65 yrs and older) 11/01/2017, 10/12/2018    Pneumococcal Conjugate 13-valent (Wizhnlc92) 10/28/2016    Pneumococcal Conjugate 7-valent (Prevnar7) 10/28/2009    Zoster Recombinant (Shingrix) 10/04/2019        Health Maintenance   Topic Date Due    DTaP/Tdap/Td vaccine (1 - Tdap) 04[birth year]/1955    Colon cancer screen colonoscopy  03/19/2016    Pneumococcal 65+ years Vaccine (2 of 2 - PPSV23) 10/28/2017    Annual Wellness Visit (AWV)  05/29/2019    Flu vaccine (1) 09/01/2019    Shingles Vaccine (2 of 2) 11/29/2019    Lipid screen  12/10/2024    DEXA (modify frequency per FRAX score)  Completed    Hepatitis A vaccine  Aged Out    Hepatitis B vaccine  Aged Out    Hib vaccine  Aged Out    Meningococcal (ACWY) vaccine  Aged Out     Recommendations for [employer] Due: see orders and patient instructions/AVS.  . Recommended screening schedule for the next 5-10 years is provided to the patient in written form: see Patient Instructions/AVS.    Tone Alexandre was seen today for medicare awv and health maintenance. Diagnoses and all orders for this visit:    Routine general medical examination at a health care facility    Colon cancer screening            Return for Medicare Annual Wellness Visit in 1 year. Reviewed with the patient: current clinicalstatus, medications, activities and diet. Side effects, adverse effects of the medication prescribedtoday, as well as treatment plan/ rationale and result expectations have been discussedwith the patient who expresses understanding and desires to proceed. Close follow upto evaluate treatment results and for coordination of care. I have reviewedthe patient's medical history in detail and updated the computerized patient record.     Eileen Ribeiro, APRN - CNP

## 2020-05-11 ENCOUNTER — TELEMEDICINE (OUTPATIENT)
Dept: FAMILY MEDICINE CLINIC | Age: 75
End: 2020-05-11
Payer: MEDICARE

## 2020-05-11 PROCEDURE — 99442 PR PHYS/QHP TELEPHONE EVALUATION 11-20 MIN: CPT | Performed by: FAMILY MEDICINE

## 2020-05-11 RX ORDER — PREDNISONE 10 MG/1
TABLET ORAL
Qty: 30 TABLET | Refills: 0 | Status: SHIPPED | OUTPATIENT
Start: 2020-05-11 | End: 2020-09-02 | Stop reason: ALTCHOICE

## 2020-05-11 RX ORDER — AZITHROMYCIN 250 MG/1
250 TABLET, FILM COATED ORAL SEE ADMIN INSTRUCTIONS
Qty: 6 TABLET | Refills: 0 | Status: SHIPPED | OUTPATIENT
Start: 2020-05-11 | End: 2020-05-16

## 2020-05-11 ASSESSMENT — ENCOUNTER SYMPTOMS
DIARRHEA: 0
VOMITING: 0
ABDOMINAL PAIN: 0
WHEEZING: 1

## 2020-05-11 NOTE — PROGRESS NOTES
Subjective:      Patient ID: Melchor Haley is a 67 y.o. female    HPI  Here with 10 days of intermittent wheezing and cough-hx of rad. Was using advair once daily but did increase to bid last week or so. Starting to bring up slightly discolored phlegm over the last few days. No fever or chills. No sore throat. No nasal drainage. No know covid exposure . Using albuterol nebulizer bid--does usually get cough every spring     Review of Systems   Constitutional: Negative for chills and fever. Respiratory: Positive for wheezing. Cardiovascular: Negative for chest pain. Gastrointestinal: Negative for abdominal pain, diarrhea and vomiting. Skin: Negative for rash. Neurological: Negative for weakness. Reviewed allergy, medical, social, surgical, family and med list changes and updated   Files   Melchor Haley is a 67 y.o. female evaluated via telephone on 5/11/2020. Consent:  She and/or health care decision maker is aware that that she may receive a bill for this telephone service, depending on her insurance coverage, and has provided verbal consent to proceed: Yes  Patient accepts tele health phone visit and associated charges   Visit about 15 min     Documentation:  I communicated with the patient and/or health care decision maker    Details of this discussion including any medical advice provided: *  This visit was a telephone encounter. Patient was located at their home. Provider was located at their ___ home or        ___x office. I affirm this is a Patient Initiated Episode with an Established Patient who has not had a related appointment within my department in the past 7 days or scheduled within the next 24 hours.     Total Time: minutes: 11-20 minutes    Note: not billable if this call serves to triage the patient into an appointment for the relevant concern      Meera Alexander   Social History     Socioeconomic History    Marital status:      Spouse name: None    Number of children: None    Years of education: None    Highest education level: None   Occupational History    None   Social Needs    Financial resource strain: None    Food insecurity     Worry: None     Inability: None    Transportation needs     Medical: None     Non-medical: None   Tobacco Use    Smoking status: Never Smoker    Smokeless tobacco: Never Used   Substance and Sexual Activity    Alcohol use: No    Drug use: No    Sexual activity: None   Lifestyle    Physical activity     Days per week: None     Minutes per session: None    Stress: None   Relationships    Social connections     Talks on phone: None     Gets together: None     Attends Adventism service: None     Active member of club or organization: None     Attends meetings of clubs or organizations: None     Relationship status: None    Intimate partner violence     Fear of current or ex partner: None     Emotionally abused: None     Physically abused: None     Forced sexual activity: None   Other Topics Concern    None   Social History Narrative    ** Merged History Encounter **          Current Outpatient Medications   Medication Sig Dispense Refill    Handicap Placard MISC by Does not apply route Starting 3-13-20 to 3-13-21 1 each 0    carvedilol (COREG) 25 MG tablet TAKE 1 TABLET TWICE DAILY  WITH MEALS 180 tablet 1    sertraline (ZOLOFT) 100 MG tablet 1 and 1/2 po q day 135 tablet 1    doxazosin (CARDURA) 4 MG tablet TAKE 1 TABLET NIGHTLY 90 tablet 1    HYDROcodone-acetaminophen (NORCO) 5-325 MG per tablet Take 1 tablet by mouth every 8 hours as needed for Pain for up to 14 days.  42 tablet 0    fluticasone-salmeterol (ADVAIR DISKUS) 500-50 MCG/DOSE diskus inhaler USE 1 PUFF EVERY 12 HOURS 1 each 0    albuterol sulfate  (90 Base) MCG/ACT inhaler Inhale 2 puffs into the lungs every 6 hours as needed for Wheezing 1 Inhaler 0    albuterol (PROVENTIL) (2.5 MG/3ML) 0.083% nebulizer solution Take 3 mLs by nebulization

## 2020-08-13 RX ORDER — SERTRALINE HYDROCHLORIDE 100 MG/1
TABLET, FILM COATED ORAL
Qty: 135 TABLET | Refills: 0 | Status: SHIPPED | OUTPATIENT
Start: 2020-08-13 | End: 2020-09-02 | Stop reason: SDUPTHER

## 2020-09-02 ENCOUNTER — OFFICE VISIT (OUTPATIENT)
Dept: FAMILY MEDICINE CLINIC | Age: 75
End: 2020-09-02
Payer: MEDICARE

## 2020-09-02 VITALS
OXYGEN SATURATION: 96 % | HEART RATE: 61 BPM | BODY MASS INDEX: 36.65 KG/M2 | SYSTOLIC BLOOD PRESSURE: 124 MMHG | WEIGHT: 220 LBS | HEIGHT: 65 IN | DIASTOLIC BLOOD PRESSURE: 80 MMHG | TEMPERATURE: 97.9 F

## 2020-09-02 PROBLEM — I48.0 PAROXYSMAL A-FIB (HCC): Status: ACTIVE | Noted: 2020-09-02

## 2020-09-02 PROCEDURE — G8427 DOCREV CUR MEDS BY ELIG CLIN: HCPCS | Performed by: FAMILY MEDICINE

## 2020-09-02 PROCEDURE — 1123F ACP DISCUSS/DSCN MKR DOCD: CPT | Performed by: FAMILY MEDICINE

## 2020-09-02 PROCEDURE — 99214 OFFICE O/P EST MOD 30 MIN: CPT | Performed by: FAMILY MEDICINE

## 2020-09-02 PROCEDURE — 1090F PRES/ABSN URINE INCON ASSESS: CPT | Performed by: FAMILY MEDICINE

## 2020-09-02 PROCEDURE — G8417 CALC BMI ABV UP PARAM F/U: HCPCS | Performed by: FAMILY MEDICINE

## 2020-09-02 PROCEDURE — G8399 PT W/DXA RESULTS DOCUMENT: HCPCS | Performed by: FAMILY MEDICINE

## 2020-09-02 PROCEDURE — 1036F TOBACCO NON-USER: CPT | Performed by: FAMILY MEDICINE

## 2020-09-02 PROCEDURE — 4040F PNEUMOC VAC/ADMIN/RCVD: CPT | Performed by: FAMILY MEDICINE

## 2020-09-02 RX ORDER — SERTRALINE HYDROCHLORIDE 100 MG/1
TABLET, FILM COATED ORAL
Qty: 135 TABLET | Refills: 1 | Status: SHIPPED | OUTPATIENT
Start: 2020-09-02 | End: 2021-03-31

## 2020-09-02 RX ORDER — DOXAZOSIN MESYLATE 4 MG/1
TABLET ORAL
Qty: 90 TABLET | Refills: 1 | Status: SHIPPED | OUTPATIENT
Start: 2020-09-02 | End: 2021-05-18 | Stop reason: SDUPTHER

## 2020-09-02 RX ORDER — ALBUTEROL SULFATE 90 UG/1
2 AEROSOL, METERED RESPIRATORY (INHALATION) EVERY 6 HOURS PRN
Qty: 1 INHALER | Refills: 0 | Status: CANCELLED | OUTPATIENT
Start: 2020-09-02

## 2020-09-02 RX ORDER — CARVEDILOL 25 MG/1
TABLET ORAL
Qty: 180 TABLET | Refills: 1 | Status: SHIPPED | OUTPATIENT
Start: 2020-09-02 | End: 2021-04-14

## 2020-09-02 ASSESSMENT — PATIENT HEALTH QUESTIONNAIRE - PHQ9
2. FEELING DOWN, DEPRESSED OR HOPELESS: 1
SUM OF ALL RESPONSES TO PHQ QUESTIONS 1-9: 2
SUM OF ALL RESPONSES TO PHQ QUESTIONS 1-9: 2
1. LITTLE INTEREST OR PLEASURE IN DOING THINGS: 1
SUM OF ALL RESPONSES TO PHQ9 QUESTIONS 1 & 2: 2

## 2020-09-02 ASSESSMENT — ENCOUNTER SYMPTOMS
DIARRHEA: 0
BACK PAIN: 1
SHORTNESS OF BREATH: 0
VOMITING: 0

## 2020-09-02 NOTE — PROGRESS NOTES
Subjective:      Patient ID: Catrachita Wilkins is a 67 y.o. female. HPI  Here in follow up for htn and lipids and depression and asthma. Using advair but not regularly. Depression stable with medication. Has been dizzy for last 6 months. Still off balance at times. No headaches. No emesis. Having dizziness nearly every day. Review of Systems   Constitutional: Negative for chills and fever. Respiratory: Negative for shortness of breath. Cardiovascular: Negative for chest pain. Gastrointestinal: Negative for diarrhea and vomiting. Musculoskeletal: Positive for back pain. Skin: Negative for rash. Neurological: Negative for weakness. Treatment Adherence:   Medication compliance:  compliant most of the time  Diet compliance:  compliant most of the time  Weight trend: stable  Current exercise: no regular exercise  Barriers: none    Hypertension:  Home blood pressure monitoring: Yes - . She is adherent to a low sodium diet. Patient denies chest pain. Antihypertensive medication side effects: no medication side effects noted. Use of agents associated with hypertension: none. Sodium (mmol/L)   Date Value   12/10/2019 143    BUN (mg/dL)   Date Value   12/10/2019 10    Glucose (mg/dL)   Date Value   12/10/2019 108   05/24/2018 107 (H)      Potassium (mmol/L)   Date Value   12/10/2019 3.8    CREATININE (no units)   Date Value   12/10/2019 0.66         Hyperlipidemia:  No new myalgias or GI upset on none.      Lab Results   Component Value Date    CHOL 204 12/10/2019    TRIG 246 12/10/2019    HDL 43 12/10/2019    LDLCALC 161 (A) 12/10/2019     Lab Results   Component Value Date    ALT 14 12/10/2019    AST 25 12/10/2019      Reviewed allergy, medical, social, surgical, family and med list changes and updated   Files--  Social History     Socioeconomic History    Marital status:      Spouse name: None    Number of children: None    Years of education: None    Highest education level: None   Occupational History    None   Social Needs    Financial resource strain: None    Food insecurity     Worry: None     Inability: None    Transportation needs     Medical: None     Non-medical: None   Tobacco Use    Smoking status: Never Smoker    Smokeless tobacco: Never Used   Substance and Sexual Activity    Alcohol use: No    Drug use: No    Sexual activity: None   Lifestyle    Physical activity     Days per week: None     Minutes per session: None    Stress: None   Relationships    Social connections     Talks on phone: None     Gets together: None     Attends Islam service: None     Active member of club or organization: None     Attends meetings of clubs or organizations: None     Relationship status: None    Intimate partner violence     Fear of current or ex partner: None     Emotionally abused: None     Physically abused: None     Forced sexual activity: None   Other Topics Concern    None   Social History Narrative    ** Merged History Encounter **          Current Outpatient Medications   Medication Sig Dispense Refill    sertraline (ZOLOFT) 100 MG tablet TAKE 1 AND 1/2 TABLETS     DAILY 135 tablet 0    fluticasone-salmeterol (ADVAIR DISKUS) 500-50 MCG/DOSE diskus inhaler USE 1 PUFF EVERY 12 HOURS 1 each 1    Handicap Placard MISC by Does not apply route Starting 3-13-20 to 3-13-21 1 each 0    carvedilol (COREG) 25 MG tablet TAKE 1 TABLET TWICE DAILY  WITH MEALS 180 tablet 1    doxazosin (CARDURA) 4 MG tablet TAKE 1 TABLET NIGHTLY 90 tablet 1    albuterol sulfate  (90 Base) MCG/ACT inhaler Inhale 2 puffs into the lungs every 6 hours as needed for Wheezing 1 Inhaler 0    albuterol (PROVENTIL) (2.5 MG/3ML) 0.083% nebulizer solution Take 3 mLs by nebulization every 6 hours as needed for Wheezing 120 each 0    Multiple Vitamins-Minerals (MULTIVITAMIN PO) Take by mouth daily       HYDROcodone-acetaminophen (NORCO) 5-325 MG per tablet Future     Standing Expiration Date:   9/2/2021     Order Specific Question:   Is Patient Fasting?/# of Hours     Answer:   9    Comprehensive Metabolic Panel     Standing Status:   Future     Standing Expiration Date:   9/2/2021    CBC Auto Differential     Standing Status:   Future     Standing Expiration Date:   9/2/2021    [facility] - Ghassan Hernández MD, Neurology, [place]     Referral Priority:   Routine     Referral Type:   Eval and Treat     Referral Reason:   Specialty Services Required     Referred to Provider:   Rossana Johnson MD     Requested Specialty:   Neurology     Number of Visits Requested:   1     Orders Placed This Encounter   Medications    sertraline (ZOLOFT) 100 MG tablet     Sig: TAKE 1 AND 1/2 TABLETS     DAILY     Dispense:  135 tablet     Refill:  1    doxazosin (CARDURA) 4 MG tablet     Sig: TAKE 1 TABLET NIGHTLY     Dispense:  90 tablet     Refill:  1    carvedilol (COREG) 25 MG tablet     Sig: TAKE 1 TABLET TWICE DAILY  WITH MEALS     Dispense:  180 tablet     Refill:  1    fluticasone-salmeterol (ADVAIR DISKUS) 500-50 MCG/DOSE diskus inhaler     Sig: USE 1 PUFF EVERY 12 HOURS     Dispense:  3 each     Refill:  1   paf stable-continue current tx   Fasting blood work soon and f/u after done

## 2020-09-03 DIAGNOSIS — I10 ESSENTIAL HYPERTENSION: ICD-10-CM

## 2020-09-03 DIAGNOSIS — E78.00 PURE HYPERCHOLESTEROLEMIA: ICD-10-CM

## 2020-09-03 LAB
ALBUMIN SERPL-MCNC: 4.1 G/DL (ref 3.5–4.6)
ALP BLD-CCNC: 78 U/L (ref 40–130)
ALT SERPL-CCNC: 14 U/L (ref 0–33)
ANION GAP SERPL CALCULATED.3IONS-SCNC: 13 MEQ/L (ref 9–15)
AST SERPL-CCNC: 23 U/L (ref 0–35)
BASOPHILS ABSOLUTE: 0 K/UL (ref 0–0.2)
BASOPHILS RELATIVE PERCENT: 0.5 %
BILIRUB SERPL-MCNC: 0.4 MG/DL (ref 0.2–0.7)
BUN BLDV-MCNC: 12 MG/DL (ref 8–23)
CALCIUM SERPL-MCNC: 9.3 MG/DL (ref 8.5–9.9)
CHLORIDE BLD-SCNC: 102 MEQ/L (ref 95–107)
CHOLESTEROL, TOTAL: 261 MG/DL (ref 0–199)
CO2: 27 MEQ/L (ref 20–31)
CREAT SERPL-MCNC: 0.6 MG/DL (ref 0.5–0.9)
EOSINOPHILS ABSOLUTE: 0.3 K/UL (ref 0–0.7)
EOSINOPHILS RELATIVE PERCENT: 5 %
GFR AFRICAN AMERICAN: >60
GFR NON-AFRICAN AMERICAN: >60
GLOBULIN: 3 G/DL (ref 2.3–3.5)
GLUCOSE BLD-MCNC: 102 MG/DL (ref 70–99)
HCT VFR BLD CALC: 38.5 % (ref 37–47)
HDLC SERPL-MCNC: 54 MG/DL (ref 40–59)
HEMOGLOBIN: 12.8 G/DL (ref 12–16)
LDL CHOLESTEROL CALCULATED: 175 MG/DL (ref 0–129)
LYMPHOCYTES ABSOLUTE: 1.5 K/UL (ref 1–4.8)
LYMPHOCYTES RELATIVE PERCENT: 22.3 %
MCH RBC QN AUTO: 30 PG (ref 27–31.3)
MCHC RBC AUTO-ENTMCNC: 33.3 % (ref 33–37)
MCV RBC AUTO: 90.2 FL (ref 82–100)
MONOCYTES ABSOLUTE: 0.5 K/UL (ref 0.2–0.8)
MONOCYTES RELATIVE PERCENT: 6.8 %
NEUTROPHILS ABSOLUTE: 4.5 K/UL (ref 1.4–6.5)
NEUTROPHILS RELATIVE PERCENT: 65.4 %
PDW BLD-RTO: 14.2 % (ref 11.5–14.5)
PLATELET # BLD: 187 K/UL (ref 130–400)
POTASSIUM SERPL-SCNC: 3.9 MEQ/L (ref 3.4–4.9)
RBC # BLD: 4.27 M/UL (ref 4.2–5.4)
SODIUM BLD-SCNC: 142 MEQ/L (ref 135–144)
TOTAL PROTEIN: 7.1 G/DL (ref 6.3–8)
TRIGL SERPL-MCNC: 158 MG/DL (ref 0–150)
WBC # BLD: 6.8 K/UL (ref 4.8–10.8)

## 2020-09-09 ENCOUNTER — VIRTUAL VISIT (OUTPATIENT)
Dept: FAMILY MEDICINE CLINIC | Age: 75
End: 2020-09-09
Payer: MEDICARE

## 2020-09-09 PROCEDURE — 99441 PR PHYS/QHP TELEPHONE EVALUATION 5-10 MIN: CPT | Performed by: FAMILY MEDICINE

## 2020-09-09 NOTE — PROGRESS NOTES
 Smokeless tobacco: Never Used   Substance and Sexual Activity    Alcohol use: No    Drug use: No    Sexual activity: Not on file   Lifestyle    Physical activity     Days per week: Not on file     Minutes per session: Not on file    Stress: Not on file   Relationships    Social connections     Talks on phone: Not on file     Gets together: Not on file     Attends Holiness service: Not on file     Active member of club or organization: Not on file     Attends meetings of clubs or organizations: Not on file     Relationship status: Not on file    Intimate partner violence     Fear of current or ex partner: Not on file     Emotionally abused: Not on file     Physically abused: Not on file     Forced sexual activity: Not on file   Other Topics Concern    Not on file   Social History Narrative    ** Merged History Encounter **          Current Outpatient Medications   Medication Sig Dispense Refill    sertraline (ZOLOFT) 100 MG tablet TAKE 1 AND 1/2 TABLETS     DAILY 135 tablet 1    doxazosin (CARDURA) 4 MG tablet TAKE 1 TABLET NIGHTLY 90 tablet 1    carvedilol (COREG) 25 MG tablet TAKE 1 TABLET TWICE DAILY  WITH MEALS 180 tablet 1    fluticasone-salmeterol (ADVAIR DISKUS) 500-50 MCG/DOSE diskus inhaler USE 1 PUFF EVERY 12 HOURS 3 each 1    HYDROcodone-acetaminophen (NORCO) 5-325 MG per tablet Take 1 tablet by mouth every 8 hours as needed for Pain for up to 14 days. 42 tablet 0    Handicap Placard MISC by Does not apply route Starting 3-13-20 to 3-13-21 1 each 0    albuterol sulfate  (90 Base) MCG/ACT inhaler Inhale 2 puffs into the lungs every 6 hours as needed for Wheezing 1 Inhaler 0    albuterol (PROVENTIL) (2.5 MG/3ML) 0.083% nebulizer solution Take 3 mLs by nebulization every 6 hours as needed for Wheezing 120 each 0    EPINEPHrine (EPIPEN 2-DE) 0.3 MG/0.3ML SOAJ injection Inject 0.3 mLs into the muscle once for 1 dose Use as directed for allergic reaction.  Please give generic 0.3 mL 0    Multiple Vitamins-Minerals (MULTIVITAMIN PO) Take by mouth daily        Current Facility-Administered Medications   Medication Dose Route Frequency Provider Last Rate Last Dose    betamethasone acetate-betamethasone sodium phosphate (CELESTONE) injection 6 mg  6 mg Intra-Lesional Once Conception Spatz, MD         Family History   Problem Relation Age of Onset    Cancer Mother         BREAST, COLON    High Blood Pressure Father      Past Medical History:   Diagnosis Date    Hyperlipidemia     Hypertension     Stroke (cerebrum) ([facility] [address] 08/01/2015    TIA (transient ischemic attack)      Objective:   LMP  (LMP Unknown)     Physical Exam  No exam done   Assessment:       Diagnosis Orders   1. Pure hypercholesterolemia     2.  History of CVA (cerebrovascular accident)           Plan:      Orders Placed This Encounter   Procedures    Lipid Panel     Standing Status:   Future     Standing Expiration Date:   9/9/2021     Order Specific Question:   Is Patient Fasting?/# of Hours     Answer:   9     Continue current meds and   Patient did not wish to see specialist for lipids--multiple drug allergies   Will try to work on diet and activity and repeat lipids in 6 months and f/u after done

## 2020-11-11 RX ORDER — ALBUTEROL SULFATE 2.5 MG/3ML
2.5 SOLUTION RESPIRATORY (INHALATION) EVERY 6 HOURS PRN
Qty: 120 EACH | Refills: 0 | Status: SHIPPED | OUTPATIENT
Start: 2020-11-11 | End: 2021-03-31

## 2020-11-28 ENCOUNTER — VIRTUAL VISIT (OUTPATIENT)
Dept: FAMILY MEDICINE CLINIC | Age: 75
End: 2020-11-28
Payer: MEDICARE

## 2020-11-28 PROCEDURE — 1123F ACP DISCUSS/DSCN MKR DOCD: CPT | Performed by: NURSE PRACTITIONER

## 2020-11-28 PROCEDURE — G8399 PT W/DXA RESULTS DOCUMENT: HCPCS | Performed by: NURSE PRACTITIONER

## 2020-11-28 PROCEDURE — 1090F PRES/ABSN URINE INCON ASSESS: CPT | Performed by: NURSE PRACTITIONER

## 2020-11-28 PROCEDURE — 99213 OFFICE O/P EST LOW 20 MIN: CPT | Performed by: NURSE PRACTITIONER

## 2020-11-28 PROCEDURE — 4040F PNEUMOC VAC/ADMIN/RCVD: CPT | Performed by: NURSE PRACTITIONER

## 2020-11-28 PROCEDURE — G8427 DOCREV CUR MEDS BY ELIG CLIN: HCPCS | Performed by: NURSE PRACTITIONER

## 2020-11-28 RX ORDER — PREDNISONE 10 MG/1
TABLET ORAL
Qty: 30 TABLET | Refills: 0 | Status: SHIPPED | OUTPATIENT
Start: 2020-11-28 | End: 2021-03-31

## 2020-11-28 RX ORDER — AZITHROMYCIN 250 MG/1
250 TABLET, FILM COATED ORAL SEE ADMIN INSTRUCTIONS
Qty: 6 TABLET | Refills: 0 | Status: SHIPPED | OUTPATIENT
Start: 2020-11-28 | End: 2020-12-03

## 2020-11-28 RX ORDER — BENZONATATE 200 MG/1
200 CAPSULE ORAL 3 TIMES DAILY PRN
Qty: 30 CAPSULE | Refills: 0 | Status: SHIPPED | OUTPATIENT
Start: 2020-11-28 | End: 2020-12-05

## 2020-11-28 ASSESSMENT — ENCOUNTER SYMPTOMS
WHEEZING: 1
DIARRHEA: 1
CHEST TIGHTNESS: 1
SHORTNESS OF BREATH: 1
NAUSEA: 0
VOMITING: 0
SINUS PAIN: 0
SORE THROAT: 1
RHINORRHEA: 1
COUGH: 1
SINUS PRESSURE: 0

## 2020-11-28 NOTE — PROGRESS NOTES
TELEHEALTH EVALUATION -- Audio/Visual (During [facility]-22 public health emergency)    -   Ori Hooker is a 67 y.o. female being evaluated by a Virtual Visit (video visit) encounter to address concerns as mentioned above. A caregiver was present when appropriate. Due to this being a TeleHealth encounter (During [facility]-66 public health emergency), evaluation of the following organ systems was limited: Vitals/Constitutional/EENT/Resp/CV/GI//MS/Neuro/Skin/Heme-Lymph-Imm. Pursuant to the emergency declaration under the [facility], [address] [place] [address] and the [employer] Act, this Virtual Visit was conducted with patient's (and/or legal guardian's) consent, to reduce the patient's risk of exposure to COVID-19 and provide necessary medical care. The patient (and/or legal guardian) has also been advised to contact this office for worsening conditions or problems, and seek emergency medical treatment and/or call 911 if deemed necessary. Patient was contacted and agreed to proceed with a virtual visit via [email or web address] me  The risks and benefits of converting to a virtual visit were discussed in light of the current infectious disease epidemic. Patient also understood that insurance coverage and co-pays are up to their individual insurance plans. Patient was located at their home. Provider was located at their office.      2020  Ori Hooker (:  1945) has requested an audio/video evaluation for the following concern(s):    HPI    Cough and congestion for 2.5-3 weeks  Wheezing for 2.5 to 3 weeks  having to use nebulizer twice a day  Everyone is telling her that she is wheezing  She has asthma  Coughing up phlegm- sometimes clear, green, this morning a little bloody  Took a large dose of robitussin this morning  Also her sister is in hospice and shes worried they wont let her in unless she gets a covid test  After going over her medications she states she stopped taking the Advair because she thought the nebulizer took the place of it because it was better          Review of Systems   Constitutional: Positive for fatigue. Negative for chills and fever. HENT: Positive for congestion, rhinorrhea and sore throat (scratchy). Negative for sinus pressure and sinus pain. Respiratory: Positive for cough, chest tightness, shortness of breath and wheezing. Gastrointestinal: Positive for diarrhea (a little bit). Negative for nausea and vomiting. Skin: Negative for rash. Neurological: Positive for headaches (this is nomal for her). Prior to Visit Medications    Medication Sig Taking? Authorizing Provider   azithromycin (ZITHROMAX) 250 MG tablet Take 1 tablet by mouth See Admin Instructions for 5 days 500mg on day 1 followed by 250mg on days 2 - 5 Yes RUMA Guaman CNP   predniSONE (DELTASONE) 10 MG tablet 40mg daily x 4d, 30mg daily x 3d, 20mg x 2d, 10mg x 1d, then d/c Yes RUMA Guaman CNP   benzonatate (TESSALON) 200 MG capsule Take 1 capsule by mouth 3 times daily as needed for Cough Yes RUMA Guaman CNP   albuterol (PROVENTIL) (2.5 MG/3ML) 0.083% nebulizer solution Take 3 mLs by nebulization every 6 hours as needed for Wheezing  Michael Duggan MD   sertraline (ZOLOFT) 100 MG tablet TAKE 1 AND 1/2 TABLETS     DAILY  Michael Duggan MD   doxazosin (CARDURA) 4 MG tablet TAKE 1 TABLET NIGHTLY  Michael Duggan MD   carvedilol (COREG) 25 MG tablet TAKE 1 TABLET TWICE DAILY  WITH MEALS  Michael Duggan MD   fluticasone-salmeterol (ADVAIR DISKUS) 500-50 MCG/DOSE diskus inhaler USE 1 PUFF EVERY 12 HOURS  Michael Duggan MD   HYDROcodone-acetaminophen (NORCO) 5-325 MG per tablet Take 1 tablet by mouth every 8 hours as needed for Pain for up to 14 days.   RUMA Hough CNP   Handicap Placard MISC by Does not apply route Starting 3-13-20 to 3-13-21  Fauzia Gates MD   albuterol sulfate  (90 Base) MCG/ACT inhaler Inhale 2 puffs into the lungs every 6 hours as needed for Wheezing  Molly Cortney Needles, APRN - CNP   EPINEPHrine (EPIPEN 2-DE) 0.3 MG/0.3ML SOAJ injection Inject 0.3 mLs into the muscle once for 1 dose Use as directed for allergic reaction.  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    TIA (transient ischemic attack)      Past Surgical History:   Procedure Laterality Date    APPENDECTOMY      CATARACT REMOVAL      HAND SURGERY      RIGHT    HERNIA REPAIR Left 5/23/2019    primary repairs  and [facility]    TONSILLECTOMY AND ADENOIDECTOMY      UMBILICAL HERNIA REPAIR N/A 5/23/2019    Primary repair      Social History     Socioeconomic History    Marital status:      Spouse name: Not on file    Number of children: Not on file    Years of education: Not on file    Highest education level: Not on file   Occupational History    Not on file   Social Needs    Financial resource strain: Not on file    Food insecurity     Worry: Not on file     Inability: Not on file    Transportation needs     Medical: Not on file     Non-medical: Not on file   Tobacco Use    Smoking status: Never Smoker    Smokeless tobacco: Never Used   Substance and Sexual Activity    Alcohol use: No    Drug use: No    Sexual activity: Not on file   Lifestyle    Physical activity     Days per week: Not on file     Minutes per session: Not on file    Stress: Not on file   Relationships    Social connections     Talks on phone: Not on file     Gets together: Not on file     Attends Adventism service: Not on file     Active member of club or organization: Not on file     Attends meetings of clubs or organizations: Not on file     Relationship status: Not on file    Intimate partner violence     Fear of current or ex partner: Not on file     Emotionally abused: Not on file     Physically abused: Not on file     Forced sexual activity: Not on file   Other Topics Concern    Not on file   Social History Narrative    ** Merged History Encounter **          Family History   Problem Relation Age of Onset    Cancer Mother         BREAST, COLON    High Blood Pressure Father      Allergies   Allergen Reactions    Floxin [Ofloxacin] Anaphylaxis    Praluent [Alirocumab] Anaphylaxis    Questran [Cholestyramine] Anaphylaxis and Swelling    Lidocaine Oint & Men-Meth Jemal Swelling    Lovaza [Omega-3-Acid Ethyl Esters]     Niacin And Related     Statins     Tobramycin      rash    Tricor [Fenofibrate]        PMH, Surgical Hx, Family Hx, and Social Hx reviewed and updated. Health Maintenance reviewed. PHYSICAL EXAMINATION:  \"[x]\" Indicates a positive item  \"[]\" Indicates a negative item    Vital Signs: (As obtained by patient/caregiver or practitioner observation)    Blood pressure-  Heart rate-    Respiratory rate-    Temperature-  Pulse oximetry-     Constitutional: [x] Appears well-developed and well-nourished [x] No apparent distress      [] Abnormal-   Mental status  [x] Alert and awake  [x] Oriented to person/place/time [x]Able to follow commands      Eyes:  EOM    []  Normal  [] Abnormal-  Sclera  [x]  Normal  [] Abnormal -         Discharge [x]  None visible  [] Abnormal -    HENT:   [x] Normocephalic, atraumatic.   [] Abnormal   [x] Mouth/Throat: Mucous membranes are moist.     External Ears [x] Normal  [] Abnormal-     Neck: [x] No visualized mass     Pulmonary/Chest: [x] Respiratory effort normal.  [x] No visualized signs of difficulty breathing or respiratory distress        [] Abnormal-      Musculoskeletal:   [] Normal gait with no signs of ataxia         [x] Normal range of motion of neck        [] Abnormal- Neurological:       [x] No Facial Asymmetry (Cranial nerve 7 motor function) (limited exam to video visit)          [x] No gaze palsy        [] Abnormal-         Skin:        [x] No significant exanthematous lesions or discoloration noted on facial skin         [] Abnormal-            Psychiatric:       [x] Normal Affect [x] No Hallucinations        [] Abnormal-     Other pertinent observable physical exam findings-   Results for orders placed or performed in visit on 09/03/20   Lipid Panel   Result Value Ref Range    Cholesterol, Total 261 (H) 0 - 199 mg/dL    Triglycerides 158 (H) 0 - 150 mg/dL    HDL 54 40 - 59 mg/dL    LDL Calculated 175 (H) 0 - 129 mg/dL   CBC Auto Differential   Result Value Ref Range    WBC 6.8 4.8 - 10.8 K/uL    RBC 4.27 4.20 - 5.40 M/uL    Hemoglobin 12.8 12.0 - 16.0 g/dL    Hematocrit 38.5 37.0 - 47.0 %    MCV 90.2 82.0 - 100.0 fL    MCH 30.0 27.0 - 31.3 pg    MCHC 33.3 33.0 - 37.0 %    RDW 14.2 11.5 - 14.5 %    Platelets 706 745 - 754 K/uL    Neutrophils % 65.4 %    Lymphocytes % 22.3 %    Monocytes % 6.8 %    Eosinophils % 5.0 %    Basophils % 0.5 %    Neutrophils Absolute 4.5 1.4 - 6.5 K/uL    Lymphocytes Absolute 1.5 1.0 - 4.8 K/uL    Monocytes Absolute 0.5 0.2 - 0.8 K/uL    Eosinophils Absolute 0.3 0.0 - 0.7 K/uL    Basophils Absolute 0.0 0.0 - 0.2 K/uL   Comprehensive Metabolic Panel   Result Value Ref Range    Sodium 142 135 - 144 mEq/L    Potassium 3.9 3.4 - 4.9 mEq/L    Chloride 102 95 - 107 mEq/L    CO2 27 20 - 31 mEq/L    Anion Gap 13 9 - 15 mEq/L    Glucose 102 (H) 70 - 99 mg/dL    BUN 12 8 - 23 mg/dL    CREATININE 0.60 0.50 - 0.90 mg/dL    GFR Non-African American >60.0 >60    GFR  >60.0 >60    Calcium 9.3 8.5 - 9.9 mg/dL    Total Protein 7.1 6.3 - 8.0 g/dL    Alb 4.1 3.5 - 4.6 g/dL    Total Bilirubin 0.4 0.2 - 0.7 mg/dL    Alkaline Phosphatase 78 40 - 130 U/L    ALT 14 0 - 33 U/L    AST 23 0 - 35 U/L    Globulin 3.0 2.3 - 3.5 g/dL ASSESSMENT/PLAN:    Restart Advair BID. Use nebulizer every 6 hrs as needed for wheezing or SOB. Antibiotic, prednisone and tessalon. Will covid test incase she needs it to visit her sister in hospice. Assessment & Plan   Diagnoses and all orders for this visit:    Lower respiratory infection (e.g., bronchitis, pneumonia, pneumonitis, pulmonitis)  -     azithromycin (ZITHROMAX) 250 MG tablet; Take 1 tablet by mouth See Admin Instructions for 5 days 500mg on day 1 followed by 250mg on days 2 - 5  -     predniSONE (DELTASONE) 10 MG tablet; 40mg daily x 4d, 30mg daily x 3d, 20mg x 2d, 10mg x 1d, then d/c  -     benzonatate (TESSALON) 200 MG capsule; Take 1 capsule by mouth 3 times daily as needed for Cough  -     COVID-19 Ambulatory; Future      Orders Placed This Encounter   Procedures    COVID-19 Ambulatory     Standing Status:   Future     Standing Expiration Date:   11/28/2021     Scheduling Instructions:      Saline media preferred given current shortage of viral transport media but both acceptable     Order Specific Question:   Is this test for diagnosis or screening? Answer:   Diagnosis of ill patient     Order Specific Question:   Symptomatic for COVID-19 as defined by CDC? Answer:   Yes     Order Specific Question:   Date of Symptom Onset     Answer:   11/7/2020     Order Specific Question:   Hospitalized for COVID-19? Answer:   No     Order Specific Question:   Admitted to ICU for COVID-19? Answer:   No     Order Specific Question:   Employed in healthcare setting? Answer:   No     Order Specific Question:   Resident in a congregate (group) care setting? Answer:   No     Order Specific Question:   Pregnant? Answer:   No     Order Specific Question:   Previously tested for COVID-19?      Answer:   Yes     Orders Placed This Encounter   Medications    azithromycin (ZITHROMAX) 250 MG tablet     Sig: Take 1 tablet by mouth See Admin Instructions for 5 days 500mg on day 1 followed by 250mg on days 2 - 5     Dispense:  6 tablet     Refill:  0    predniSONE (DELTASONE) 10 MG tablet     Simg daily x 4d, 30mg daily x 3d, 20mg x 2d, 10mg x 1d, then d/c     Dispense:  30 tablet     Refill:  0    benzonatate (TESSALON) 200 MG capsule     Sig: Take 1 capsule by mouth 3 times daily as needed for Cough     Dispense:  30 capsule     Refill:  0     There are no discontinued medications. Return for Follow up with PCP. Reviewed with the patient: current clinical status, medications, activities and diet. Side effects, adverse effects of the medication prescribed today, as well as treatment plan/ rationale and result expectations have been discussed with the patient who expresses understanding and desires to proceed. Close follow up to evaluate treatment results and for coordination of care. I have reviewed the patient's medical history in detail and updated the computerized patient record. Patient identification was verified at the start of the visit: Yes    Total time spent on this encounter: Not billed by time      --Colen Merlin, APRN - CNP on 2020 at 12:45 PM    An electronic signature was used to authenticate this note.

## 2020-11-28 NOTE — PATIENT INSTRUCTIONS
Patient Education        Wheezing or Bronchoconstriction: Care Instructions  Your Care Instructions  Wheezing is a whistling noise made during breathing. It occurs when the small airways, or bronchial tubes, that lead to your lungs swell or contract (spasm) and become narrow. This narrowing is called bronchoconstriction. When your airways constrict, it is hard for air to pass through and this makes it hard for you to breathe. Wheezing and bronchoconstriction can be caused by many problems, including:  · An infection such as the flu or a cold. · Allergies such as hay fever. · Diseases such as asthma or chronic obstructive pulmonary disease. · Smoking. Treatment for your wheezing depends on what is causing the problem. Your wheezing may get better without treatment. But you may need to pay attention to things that cause your wheezing and avoid them. Or you may need medicine to help treat the wheezing and to reduce the swelling or to relieve spasms in your lungs. Follow-up care is a key part of your treatment and safety. Be sure to make and go to all appointments, and call your doctor if you are having problems. It is also a good idea to know your test results and keep a list of the medicines you take. How can you care for yourself at home? · Take your medicine exactly as prescribed. Call your doctor if you think you are having a problem with your medicine. You will get more details on the specific medicine your doctor prescribes. · If your doctor prescribed antibiotics, take them as directed. Do not stop taking them just because you feel better. You need to take the full course of antibiotics. · Breathe moist air from a humidifier, hot shower, or sink filled with hot water. This may help ease your symptoms and make it easier for you to breathe. · If you have congestion in your nose and throat, drinking plenty of fluids, especially hot fluids, may help relieve your symptoms.  If you have kidney, heart, or liver disease and have to limit fluids, talk with your doctor before you increase the amount of fluids you drink. · If you have mucus in your airways, it may help to breathe deeply and cough. · Do not smoke or allow others to smoke around you. Smoking can make your wheezing worse. If you need help quitting, talk to your doctor about stop-smoking programs and medicines. These can increase your chances of quitting for good. · Avoid things that may cause your wheezing. These may include colds, smoke, air pollution, dust, pollen, pets, cockroaches, stress, and cold air. When should you call for help? Call 911 anytime you think you may need emergency care. For example, call if:    · You have severe trouble breathing.     · You passed out (lost consciousness). Call your doctor now or seek immediate medical care if:    · You cough up yellow, dark brown, or bloody mucus (sputum).     · You have new or worse shortness of breath.     · Your wheezing is not getting better or it gets worse after you start taking your medicine. Watch closely for changes in your health, and be sure to contact your doctor if:    · You do not get better as expected. Where can you learn more? Go to [email or web address] and sign in to your [email or web address] account. Enter [id number] in the [facility] box to learn more about \"Wheezing or Bronchoconstriction: Care Instructions. \"     If you do not have an account, please click on the \"Sign Up Now\" link. Current as of: February 24, 2020               Content Version: 12.6  © 2006-2020 [employer]. Care instructions adapted under license by [facility] ([facility]). If you have questions about a medical condition or this instruction, always ask your healthcare professional. [place] [address] any warranty or liability for your use of this information.

## 2020-11-29 DIAGNOSIS — J22 LOWER RESPIRATORY INFECTION (E.G., BRONCHITIS, PNEUMONIA, PNEUMONITIS, PULMONITIS): ICD-10-CM

## 2020-12-01 LAB
SARS-COV-2: NOT DETECTED
SOURCE: NORMAL

## 2021-01-07 DIAGNOSIS — M17.0 ARTHRITIS OF BOTH KNEES: ICD-10-CM

## 2021-01-18 DIAGNOSIS — M17.0 ARTHRITIS OF BOTH KNEES: Primary | ICD-10-CM

## 2021-03-17 ENCOUNTER — TELEPHONE (OUTPATIENT)
Dept: FAMILY MEDICINE CLINIC | Age: 76
End: 2021-03-17

## 2021-03-17 ENCOUNTER — OFFICE VISIT (OUTPATIENT)
Dept: FAMILY MEDICINE CLINIC | Age: 76
End: 2021-03-17
Payer: MEDICARE

## 2021-03-17 VITALS
HEART RATE: 96 BPM | OXYGEN SATURATION: 98 % | WEIGHT: 227 LBS | DIASTOLIC BLOOD PRESSURE: 82 MMHG | TEMPERATURE: 98 F | SYSTOLIC BLOOD PRESSURE: 120 MMHG | HEIGHT: 65 IN | RESPIRATION RATE: 14 BRPM | BODY MASS INDEX: 37.82 KG/M2

## 2021-03-17 DIAGNOSIS — Z91.81 AT HIGH RISK FOR FALLS: ICD-10-CM

## 2021-03-17 DIAGNOSIS — F33.2 SEVERE EPISODE OF RECURRENT MAJOR DEPRESSIVE DISORDER, WITHOUT PSYCHOTIC FEATURES (HCC): Primary | ICD-10-CM

## 2021-03-17 DIAGNOSIS — M17.0 ARTHRITIS OF BOTH KNEES: ICD-10-CM

## 2021-03-17 PROCEDURE — 3288F FALL RISK ASSESSMENT DOCD: CPT | Performed by: FAMILY MEDICINE

## 2021-03-17 PROCEDURE — G8427 DOCREV CUR MEDS BY ELIG CLIN: HCPCS | Performed by: FAMILY MEDICINE

## 2021-03-17 PROCEDURE — G8399 PT W/DXA RESULTS DOCUMENT: HCPCS | Performed by: FAMILY MEDICINE

## 2021-03-17 PROCEDURE — 4040F PNEUMOC VAC/ADMIN/RCVD: CPT | Performed by: FAMILY MEDICINE

## 2021-03-17 PROCEDURE — 1036F TOBACCO NON-USER: CPT | Performed by: FAMILY MEDICINE

## 2021-03-17 PROCEDURE — 99214 OFFICE O/P EST MOD 30 MIN: CPT | Performed by: FAMILY MEDICINE

## 2021-03-17 PROCEDURE — 1090F PRES/ABSN URINE INCON ASSESS: CPT | Performed by: FAMILY MEDICINE

## 2021-03-17 PROCEDURE — 1123F ACP DISCUSS/DSCN MKR DOCD: CPT | Performed by: FAMILY MEDICINE

## 2021-03-17 PROCEDURE — G8417 CALC BMI ABV UP PARAM F/U: HCPCS | Performed by: FAMILY MEDICINE

## 2021-03-17 PROCEDURE — G8484 FLU IMMUNIZE NO ADMIN: HCPCS | Performed by: FAMILY MEDICINE

## 2021-03-17 RX ORDER — DULOXETIN HYDROCHLORIDE 60 MG/1
60 CAPSULE, DELAYED RELEASE ORAL DAILY
Qty: 30 CAPSULE | Refills: 0 | Status: SHIPPED | OUTPATIENT
Start: 2021-03-17 | End: 2021-03-31 | Stop reason: ALTCHOICE

## 2021-03-17 ASSESSMENT — PATIENT HEALTH QUESTIONNAIRE - PHQ9
10. IF YOU CHECKED OFF ANY PROBLEMS, HOW DIFFICULT HAVE THESE PROBLEMS MADE IT FOR YOU TO DO YOUR WORK, TAKE CARE OF THINGS AT HOME, OR GET ALONG WITH OTHER PEOPLE: 3
SUM OF ALL RESPONSES TO PHQ9 QUESTIONS 1 & 2: 6
2. FEELING DOWN, DEPRESSED OR HOPELESS: 3
1. LITTLE INTEREST OR PLEASURE IN DOING THINGS: 3
SUM OF ALL RESPONSES TO PHQ QUESTIONS 1-9: 24
6. FEELING BAD ABOUT YOURSELF - OR THAT YOU ARE A FAILURE OR HAVE LET YOURSELF OR YOUR FAMILY DOWN: 3
8. MOVING OR SPEAKING SO SLOWLY THAT OTHER PEOPLE COULD HAVE NOTICED. OR THE OPPOSITE, BEING SO FIGETY OR RESTLESS THAT YOU HAVE BEEN MOVING AROUND A LOT MORE THAN USUAL: 0
7. TROUBLE CONCENTRATING ON THINGS, SUCH AS READING THE NEWSPAPER OR WATCHING TELEVISION: 3
SUM OF ALL RESPONSES TO PHQ QUESTIONS 1-9: 24

## 2021-03-17 ASSESSMENT — ENCOUNTER SYMPTOMS
VOMITING: 0
DIARRHEA: 0

## 2021-03-17 NOTE — PROGRESS NOTES
Subjective:      Patient ID: Catherne Mortimer is a 67 y.o. female    Mental Health Problem  The primary symptoms include dysphoric mood. This is a chronic problem. The onset of the illness is precipitated by emotional stress. The degree of incapacity that she is experiencing as a consequence of her illness is severe. Sequelae of the illness include harmed interpersonal relations. Additional symptoms of the illness include anhedonia, appetite change and fatigue. She does not have a plan to attempt suicide. She does not contemplate harming herself. She has not already injured self. She does not contemplate injuring another person. Here with worsening depression over the last 6 months. Dog passed away recently. Sister in hospice. Energy level is low. No desire to do much. Does not have suicidal wishes but wants to feel better soon as very unhappy with life at this point in time and  does not feel reason to go on. Weight up from last time   Frustrated with chronic back pain and not being able to do some of the physical activities which she has done in past.      Review of Systems   Constitutional: Positive for appetite change and fatigue. Negative for activity change and fever. Gastrointestinal: Negative for diarrhea and vomiting. Skin: Negative for rash. Psychiatric/Behavioral: Positive for dysphoric mood and sleep disturbance.      Reviewed allergy, medical, social, surgical, family and med list changes and updated   Files     Social History     Socioeconomic History    Marital status:      Spouse name: None    Number of children: None    Years of education: None    Highest education level: None   Occupational History    None   Social Needs    Financial resource strain: None    Food insecurity     Worry: None     Inability: None    Transportation needs     Medical: None     Non-medical: None   Tobacco Use    Smoking status: Never Smoker    Smokeless tobacco: Never Used   Substance and Sexual Activity    Alcohol use: No    Drug use: No    Sexual activity: None   Lifestyle    Physical activity     Days per week: None     Minutes per session: None    Stress: None   Relationships    Social connections     Talks on phone: None     Gets together: None     Attends Confucianism service: None     Active member of club or organization: None     Attends meetings of clubs or organizations: None     Relationship status: None    Intimate partner violence     Fear of current or ex partner: None     Emotionally abused: None     Physically abused: None     Forced sexual activity: None   Other Topics Concern    None   Social History Narrative    ** Merged History Encounter **          Current Outpatient Medications   Medication Sig Dispense Refill    Handicap Placard MISC by Does not apply route Starting 3-13-20 to 3-13-21 1 each 0    predniSONE (DELTASONE) 10 MG tablet 40mg daily x 4d, 30mg daily x 3d, 20mg x 2d, 10mg x 1d, then d/c 30 tablet 0    albuterol (PROVENTIL) (2.5 MG/3ML) 0.083% nebulizer solution Take 3 mLs by nebulization every 6 hours as needed for Wheezing 120 each 0    sertraline (ZOLOFT) 100 MG tablet TAKE 1 AND 1/2 TABLETS     DAILY 135 tablet 1    doxazosin (CARDURA) 4 MG tablet TAKE 1 TABLET NIGHTLY 90 tablet 1    carvedilol (COREG) 25 MG tablet TAKE 1 TABLET TWICE DAILY  WITH MEALS 180 tablet 1    fluticasone-salmeterol (ADVAIR DISKUS) 500-50 MCG/DOSE diskus inhaler USE 1 PUFF EVERY 12 HOURS 3 each 1    albuterol sulfate  (90 Base) MCG/ACT inhaler Inhale 2 puffs into the lungs every 6 hours as needed for Wheezing 1 Inhaler 0    Multiple Vitamins-Minerals (MULTIVITAMIN PO) Take by mouth daily       HYDROcodone-acetaminophen (NORCO) 5-325 MG per tablet Take 1 tablet by mouth every 8 hours as needed for Pain for up to 14 days.  42 tablet 0    EPINEPHrine (EPIPEN 2-DE) 0.3 MG/0.3ML SOAJ injection Inject 0.3 mLs into the muscle once for 1 dose Use as directed for allergic reaction. 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 Alert   Thought content appropriate  Good eye contact  Not suicidal or homicidal currently   Gen:   NAD  Lungs: clear and equal breath sounds  Heart:   Rate reg. No murmur  Assessment:       Diagnosis Orders   1. Severe episode of recurrent major depressive disorder, without psychotic features [facility])  Brannon Vargas MD, [place]   2.  At high risk for falls           Plan:     Stop zoloft and change to cymbalta   Orders Placed This Encounter   Medications    DULoxetine (CYMBALTA) 60 MG extended release capsule     Sig: Take 1 capsule by mouth daily     Dispense:  30 capsule     Refill:  0     Orders Placed This Encounter   Procedures   Brannon Vargas MD, [place]     Referral Priority:   Urgent     Referral Type:   Eval and Treat     Referral Reason:   Specialty Services Required     Referred to Provider:   Penelope Hart MD     Requested Specialty:   Psychiatry     Number of Visits Requested:   1   .f/u in 2 weeks if not seen by psychiatry by then-should go to er for any acute suicidal wishes-patient agrees

## 2021-03-17 NOTE — TELEPHONE ENCOUNTER
Monika Kelly from [facility] called stating pt is there now and they are needing updated exp date on handicap placard from today.     Asking to please fax new one to [phone]

## 2021-03-31 ENCOUNTER — OFFICE VISIT (OUTPATIENT)
Dept: FAMILY MEDICINE CLINIC | Age: 76
End: 2021-03-31
Payer: MEDICARE

## 2021-03-31 VITALS
DIASTOLIC BLOOD PRESSURE: 82 MMHG | RESPIRATION RATE: 14 BRPM | TEMPERATURE: 97.9 F | SYSTOLIC BLOOD PRESSURE: 130 MMHG | BODY MASS INDEX: 37.82 KG/M2 | WEIGHT: 227 LBS | HEART RATE: 65 BPM | OXYGEN SATURATION: 98 % | HEIGHT: 65 IN

## 2021-03-31 DIAGNOSIS — E78.00 PURE HYPERCHOLESTEROLEMIA: ICD-10-CM

## 2021-03-31 DIAGNOSIS — I48.0 PAROXYSMAL A-FIB (HCC): ICD-10-CM

## 2021-03-31 DIAGNOSIS — I10 ESSENTIAL HYPERTENSION: ICD-10-CM

## 2021-03-31 DIAGNOSIS — E66.01 MORBIDLY OBESE (HCC): ICD-10-CM

## 2021-03-31 DIAGNOSIS — M46.1 SACROILIAC INFLAMMATION (HCC): ICD-10-CM

## 2021-03-31 DIAGNOSIS — F33.2 SEVERE EPISODE OF RECURRENT MAJOR DEPRESSIVE DISORDER, WITHOUT PSYCHOTIC FEATURES (HCC): Primary | ICD-10-CM

## 2021-03-31 PROCEDURE — G8399 PT W/DXA RESULTS DOCUMENT: HCPCS | Performed by: FAMILY MEDICINE

## 2021-03-31 PROCEDURE — 1036F TOBACCO NON-USER: CPT | Performed by: FAMILY MEDICINE

## 2021-03-31 PROCEDURE — 1123F ACP DISCUSS/DSCN MKR DOCD: CPT | Performed by: FAMILY MEDICINE

## 2021-03-31 PROCEDURE — G8427 DOCREV CUR MEDS BY ELIG CLIN: HCPCS | Performed by: FAMILY MEDICINE

## 2021-03-31 PROCEDURE — 1090F PRES/ABSN URINE INCON ASSESS: CPT | Performed by: FAMILY MEDICINE

## 2021-03-31 PROCEDURE — 99214 OFFICE O/P EST MOD 30 MIN: CPT | Performed by: FAMILY MEDICINE

## 2021-03-31 PROCEDURE — 4040F PNEUMOC VAC/ADMIN/RCVD: CPT | Performed by: FAMILY MEDICINE

## 2021-03-31 PROCEDURE — G8484 FLU IMMUNIZE NO ADMIN: HCPCS | Performed by: FAMILY MEDICINE

## 2021-03-31 PROCEDURE — G8417 CALC BMI ABV UP PARAM F/U: HCPCS | Performed by: FAMILY MEDICINE

## 2021-03-31 RX ORDER — DULOXETIN HYDROCHLORIDE 60 MG/1
60 CAPSULE, DELAYED RELEASE ORAL DAILY
Qty: 90 CAPSULE | Refills: 0 | Status: SHIPPED | OUTPATIENT
Start: 2021-03-31 | End: 2021-06-28

## 2021-03-31 RX ORDER — DULOXETIN HYDROCHLORIDE 60 MG/1
60 CAPSULE, DELAYED RELEASE ORAL DAILY
COMMUNITY
End: 2021-05-03

## 2021-03-31 ASSESSMENT — ENCOUNTER SYMPTOMS
NAUSEA: 0
VOMITING: 0

## 2021-03-31 NOTE — PROGRESS NOTES
Subjective:      Patient ID: Jacky Sabillon is a 67 y.o. female    Hypertension  This is a chronic problem. The current episode started more than 1 year ago. The problem is controlled. Pertinent negatives include no chest pain. Hyperlipidemia  Pertinent negatives include no chest pain. Mental Health Problem  This is a chronic problem. The onset of the illness is precipitated by emotional stress. Additional symptoms of the illness do not include unexpected weight change. Here in follow up for depression and also for follow up for htn and lipids . Doing much better since last time. Taking cymbalta every am.   Slight dizziness in am but that has resolved last week or so. Activity improving and feels more energetic   And feels more motivated to do things. .     Review of Systems   Constitutional: Positive for activity change. Negative for fever and unexpected weight change. Cardiovascular: Negative for chest pain. Gastrointestinal: Negative for nausea and vomiting. Skin: Negative for rash. Neurological: Negative for weakness.      Reviewed allergy, medical, social, surgical, family and med list changes and updated   Files     Social History     Socioeconomic History    Marital status:      Spouse name: None    Number of children: None    Years of education: None    Highest education level: None   Occupational History    None   Social Needs    Financial resource strain: None    Food insecurity     Worry: None     Inability: None    Transportation needs     Medical: None     Non-medical: None   Tobacco Use    Smoking status: Never Smoker    Smokeless tobacco: Never Used   Substance and Sexual Activity    Alcohol use: No    Drug use: No    Sexual activity: None   Lifestyle    Physical activity     Days per week: None     Minutes per session: None    Stress: None   Relationships    Social connections     Talks on phone: None     Gets together: None     Attends Bahai service: BREAST, COLON    High Blood Pressure Father      Past Medical History:   Diagnosis Date    Hyperlipidemia     Hypertension     Stroke (cerebrum) ([facility] 08/01/2015    TIA (transient ischemic attack)      Objective:   /82   Pulse 65   Temp 97.9 °F (36.6 °C)   Resp 14   Ht 5' 5\" (1.651 m)   Wt 227 lb (103 kg)   LMP  (LMP Unknown)   SpO2 98%   BMI 37.77 kg/m²     Physical Exam  Neck:no carotid bruits. No masses. No adenopathy. No thyroid asymmetry. Lungs:clear and equal breath sounds. No wheezes or rales. Heart:rate reg. No murmur. No gallops   Pulses:Radials 2+ equal               Poster tib 1+ equal  Extremities:no edema in either leg  Gen: In no acute distress  Abdomen; B.S present. Soft  Non tender. No hepatosplenomegaly. No masses   Patient with appropriate affect. Alert     Thought content appropriate  Good eye contact  Not suicidal or homicidal    Assessment:       Diagnosis Orders   1. Severe episode of recurrent major depressive disorder, without psychotic features ([facility]     2. Pure hypercholesterolemia     3. Essential hypertension     4. Sacroiliac inflammation (HCC)     5. Paroxysmal A-fib ([facility] [address] [facility]     6.  Morbidly obese ([address]           Plan:    continue current meds including cymbalta  Sacroiliac inflammation under treatment of pain management-stable-continue current tx  paf-stable -no further problems-continue current tx  Morbid obesity-stable -will work on diet and increasing activity   Orders Placed This Encounter   Medications    DULoxetine (CYMBALTA) 60 MG extended release capsule     Sig: Take 1 capsule by mouth daily     Dispense:  90 capsule     Refill:  0   f/u in 4 weeks

## 2021-05-03 ENCOUNTER — OFFICE VISIT (OUTPATIENT)
Dept: FAMILY MEDICINE CLINIC | Age: 76
End: 2021-05-03
Payer: MEDICARE

## 2021-05-03 VITALS
HEIGHT: 65 IN | SYSTOLIC BLOOD PRESSURE: 130 MMHG | WEIGHT: 223 LBS | DIASTOLIC BLOOD PRESSURE: 84 MMHG | RESPIRATION RATE: 14 BRPM | HEART RATE: 57 BPM | OXYGEN SATURATION: 97 % | BODY MASS INDEX: 37.15 KG/M2 | TEMPERATURE: 98 F

## 2021-05-03 DIAGNOSIS — I10 ESSENTIAL HYPERTENSION: Primary | ICD-10-CM

## 2021-05-03 DIAGNOSIS — F33.2 SEVERE EPISODE OF RECURRENT MAJOR DEPRESSIVE DISORDER, WITHOUT PSYCHOTIC FEATURES (HCC): ICD-10-CM

## 2021-05-03 DIAGNOSIS — J45.40 MODERATE PERSISTENT ASTHMA WITHOUT COMPLICATION: ICD-10-CM

## 2021-05-03 DIAGNOSIS — E78.00 PURE HYPERCHOLESTEROLEMIA: ICD-10-CM

## 2021-05-03 PROCEDURE — 1090F PRES/ABSN URINE INCON ASSESS: CPT | Performed by: FAMILY MEDICINE

## 2021-05-03 PROCEDURE — G8417 CALC BMI ABV UP PARAM F/U: HCPCS | Performed by: FAMILY MEDICINE

## 2021-05-03 PROCEDURE — 99214 OFFICE O/P EST MOD 30 MIN: CPT | Performed by: FAMILY MEDICINE

## 2021-05-03 PROCEDURE — 1036F TOBACCO NON-USER: CPT | Performed by: FAMILY MEDICINE

## 2021-05-03 PROCEDURE — G8399 PT W/DXA RESULTS DOCUMENT: HCPCS | Performed by: FAMILY MEDICINE

## 2021-05-03 PROCEDURE — 4040F PNEUMOC VAC/ADMIN/RCVD: CPT | Performed by: FAMILY MEDICINE

## 2021-05-03 PROCEDURE — 1123F ACP DISCUSS/DSCN MKR DOCD: CPT | Performed by: FAMILY MEDICINE

## 2021-05-03 PROCEDURE — G8427 DOCREV CUR MEDS BY ELIG CLIN: HCPCS | Performed by: FAMILY MEDICINE

## 2021-05-03 ASSESSMENT — PATIENT HEALTH QUESTIONNAIRE - PHQ9
1. LITTLE INTEREST OR PLEASURE IN DOING THINGS: 0
SUM OF ALL RESPONSES TO PHQ QUESTIONS 1-9: 0
SUM OF ALL RESPONSES TO PHQ9 QUESTIONS 1 & 2: 0

## 2021-05-03 ASSESSMENT — ENCOUNTER SYMPTOMS
DIARRHEA: 0
COUGH: 0
VOMITING: 0

## 2021-05-03 NOTE — PROGRESS NOTES
Subjective:      Patient ID: Marizol Pastor is a 67 y.o. female. HPI  Here in follow up for htn and lipids and depression asthma. .  Feeling great on cymbalta and would like to continue on it. Only taking advair once daily as usually get some tongue irritation when using it twice daily. Does not usually have any problems with asthma during summer and fall. Review of Systems   Constitutional: Negative for chills and fever. Respiratory: Negative for cough. Cardiovascular: Negative for chest pain. Gastrointestinal: Negative for diarrhea and vomiting. Neurological: Negative for weakness. Psychiatric/Behavioral: Positive for sleep disturbance. Treatment Adherence:   Medication compliance:  compliant most of the time  Diet compliance:  compliant most of the time  Weight trend: stable  Current exercise: no regular exercise  Barriers: none    Hypertension:  Home blood pressure monitoring: Yes - . She is adherent to a low sodium diet. Patient denies chest pain. Antihypertensive medication side effects: no medication side effects noted. Use of agents associated with hypertension: none. Sodium (mEq/L)   Date Value   09/03/2020 142    BUN (mg/dL)   Date Value   09/03/2020 12    Glucose (mg/dL)   Date Value   09/03/2020 102 (H)   05/24/2018 107 (H)      Potassium (mEq/L)   Date Value   09/03/2020 3.9    CREATININE (mg/dL)   Date Value   09/03/2020 0.60         Hyperlipidemia:  No new myalgias or GI upset on none.      Lab Results   Component Value Date    CHOL 261 (H) 09/03/2020    TRIG 158 (H) 09/03/2020    HDL 54 09/03/2020    LDLCALC 175 (H) 09/03/2020     Lab Results   Component Value Date    ALT 14 09/03/2020    AST 23 09/03/2020      Reviewed allergy, medical, social, surgical, family and med list changes and updated   files  Social History     Socioeconomic History    Marital status:      Spouse name: None    Number of children: None    Years of education: None    Highest education level: None   Occupational History    None   Social Needs    Financial resource strain: None    Food insecurity     Worry: None     Inability: None    Transportation needs     Medical: None     Non-medical: None   Tobacco Use    Smoking status: Never Smoker    Smokeless tobacco: Never Used   Substance and Sexual Activity    Alcohol use: No    Drug use: No    Sexual activity: None   Lifestyle    Physical activity     Days per week: None     Minutes per session: None    Stress: None   Relationships    Social connections     Talks on phone: None     Gets together: None     Attends Gnosticism service: None     Active member of club or organization: None     Attends meetings of clubs or organizations: None     Relationship status: None    Intimate partner violence     Fear of current or ex partner: None     Emotionally abused: None     Physically abused: None     Forced sexual activity: None   Other Topics Concern    None   Social History Narrative    ** Merged History Encounter **          Current Outpatient Medications   Medication Sig Dispense Refill    carvedilol (COREG) 25 MG tablet TAKE 1 TABLET TWICE DAILY  WITH MEALS 180 tablet 0    DULoxetine (CYMBALTA) 60 MG extended release capsule Take 1 capsule by mouth daily 90 capsule 0    lidocaine (LIDODERM) 5 % Place 1 patch onto the skin daily 12 hours on, 12 hours off.  30 patch 0    Handicap Placard MISC by Does not apply route Start 3-17-21 to 3-17-22 1 each 0    Handicap Placard MISC by Does not apply route Duration of 1 year  Patient is high risk for fall 1 each 0    doxazosin (CARDURA) 4 MG tablet TAKE 1 TABLET NIGHTLY 90 tablet 1    fluticasone-salmeterol (ADVAIR DISKUS) 500-50 MCG/DOSE diskus inhaler USE 1 PUFF EVERY 12 HOURS 3 each 1    albuterol sulfate  (90 Base) MCG/ACT inhaler Inhale 2 puffs into the lungs every 6 hours as needed for Wheezing 1 Inhaler 0    Multiple Vitamins-Minerals (MULTIVITAMIN PO) Take by mouth daily       HYDROcodone-acetaminophen (NORCO) 5-325 MG per tablet Take 1 tablet by mouth every 8 hours as needed for Pain for up to 14 days. 42 tablet 0    EPINEPHrine (EPIPEN 2-DE) 0.3 MG/0.3ML SOAJ injection Inject 0.3 mLs into the muscle once for 1 dose Use as directed for allergic reaction. Please give generic 0.3 mL 0     Current Facility-Administered Medications   Medication Dose Route Frequency Provider Last Rate Last Admin    betamethasone acetate-betamethasone sodium phosphate (CELESTONE) injection 6 mg  6 mg Intra-Lesional Once Aniya Perla MD         Family History   Problem Relation Age of Onset    Cancer Mother         BREAST, COLON    High Blood Pressure Father      Past Medical History:   Diagnosis Date    Hyperlipidemia     Hypertension     Stroke (cerebrum) ([facility] [address] 08/01/2015    TIA (transient ischemic attack)      Objective:   Physical Exam  Neck:no carotid bruits. No masses. No adenopathy. No thyroid asymmetry. Lungs:clear and equal breath sounds. No wheezes or rales. Heart:rate reg. No murmur. No gallops   Pulses:Radials 2+ equal               Poster tib 1+ equal  Extremities:no edema in either leg  Gen: In no acute distress  Abdomen; B.S present. Soft  Non tender. No hepatosplenomegaly. No masses   Patient with appropriate affect. Alert   Thought content appropriate  Good eye contact  Not suicidal or homicidal    Assessment / Plan:       Diagnosis Orders   1. Essential hypertension     2. Pure hypercholesterolemia     3. Severe episode of recurrent major depressive disorder, without psychotic features ([facility] [address]     4.  Moderate persistent asthma without complication       Orders Placed This Encounter   Procedures    Lipid Panel     Standing Status:   Future     Standing Expiration Date:   5/3/2022     Order Specific Question:   Is Patient Fasting?/# of Hours     Answer:   9    Comprehensive Metabolic Panel     Standing Status:   Future Standing Expiration Date:   5/3/2022    CBC Auto Differential     Standing Status:   Future     Standing Expiration Date:   5/3/2022      Depressions stable -continue current meds   Continue current meds   Perroy stop advair after this month   Fasting blood work in [facility] and f/u after done

## 2021-05-18 DIAGNOSIS — I10 ESSENTIAL HYPERTENSION: ICD-10-CM

## 2021-05-18 RX ORDER — DOXAZOSIN MESYLATE 4 MG/1
TABLET ORAL
Qty: 90 TABLET | Refills: 1 | Status: SHIPPED | OUTPATIENT
Start: 2021-05-18 | End: 2021-05-20 | Stop reason: SDUPTHER

## 2021-05-18 RX ORDER — DOXAZOSIN MESYLATE 4 MG/1
TABLET ORAL
Qty: 90 TABLET | Refills: 1 | OUTPATIENT
Start: 2021-05-18

## 2021-05-18 NOTE — TELEPHONE ENCOUNTER
Patient is requesting a short supply of this medication to be sent to [facility]. She is completely out and  will have to wait for the mail order script.     Please approve or deny this request.    Rx requested:  Requested Prescriptions     Pending Prescriptions Disp Refills    doxazosin (CARDURA) 4 MG tablet 90 tablet 1     Sig: TAKE 1 TABLET NIGHTLY         Last Office Visit:   5/3/2021      Next Visit Date:  Future Appointments   Date Time Provider Guero Bradley   9/3/2021  2:00 PM Dalia Hernandez  [facility]

## 2021-05-20 ENCOUNTER — VIRTUAL VISIT (OUTPATIENT)
Dept: PRIMARY CARE CLINIC | Age: 76
End: 2021-05-20
Payer: MEDICARE

## 2021-05-20 DIAGNOSIS — I10 ESSENTIAL HYPERTENSION: ICD-10-CM

## 2021-05-20 DIAGNOSIS — Z00.00 ROUTINE GENERAL MEDICAL EXAMINATION AT A HEALTH CARE FACILITY: Primary | ICD-10-CM

## 2021-05-20 PROCEDURE — G0439 PPPS, SUBSEQ VISIT: HCPCS | Performed by: NURSE PRACTITIONER

## 2021-05-20 RX ORDER — DOXAZOSIN MESYLATE 4 MG/1
TABLET ORAL
Qty: 7 TABLET | Refills: 0 | Status: SHIPPED | OUTPATIENT
Start: 2021-05-20 | End: 2021-09-03 | Stop reason: SDUPTHER

## 2021-05-20 SDOH — HEALTH STABILITY: PHYSICAL HEALTH: ON AVERAGE, HOW MANY DAYS PER WEEK DO YOU ENGAGE IN MODERATE TO STRENUOUS EXERCISE (LIKE A BRISK WALK)?: 0 DAYS

## 2021-05-20 SDOH — ECONOMIC STABILITY: FOOD INSECURITY: WITHIN THE PAST 12 MONTHS, THE FOOD YOU BOUGHT JUST DIDN'T LAST AND YOU DIDN'T HAVE MONEY TO GET MORE.: NEVER TRUE

## 2021-05-20 ASSESSMENT — PATIENT HEALTH QUESTIONNAIRE - PHQ9
8. MOVING OR SPEAKING SO SLOWLY THAT OTHER PEOPLE COULD HAVE NOTICED. OR THE OPPOSITE, BEING SO FIGETY OR RESTLESS THAT YOU HAVE BEEN MOVING AROUND A LOT MORE THAN USUAL: 0
2. FEELING DOWN, DEPRESSED OR HOPELESS: 2
SUM OF ALL RESPONSES TO PHQ QUESTIONS 1-9: 6
5. POOR APPETITE OR OVEREATING: 0
SUM OF ALL RESPONSES TO PHQ9 QUESTIONS 1 & 2: 4
7. TROUBLE CONCENTRATING ON THINGS, SUCH AS READING THE NEWSPAPER OR WATCHING TELEVISION: 1

## 2021-05-20 NOTE — PROGRESS NOTES
Medicare Annual Wellness Visit  Are Name: Rosita Part Date: 2021   MRN: [id number] Sex: Female   Age: 67 y.o. Ethnicity: Non-/Non    : 1945 Race: Kristine Rodriguez is here for Medicare AWV    Screenings for behavioral, psychosocial and functional/safety risks, and cognitive dysfunction are all negative except as indicated below. These results, as well as other patient data from the [address] [facility] [place] [address] form, are documented in Flowsheets linked to this Encounter. Allergies   Allergen Reactions    Floxin [Ofloxacin] Anaphylaxis    Praluent [Alirocumab] Anaphylaxis    Questran [Cholestyramine] Anaphylaxis and Swelling    Lidocaine Oint & Men-Meth Jemal Swelling    Lovaza [Omega-3-Acid Ethyl Esters]     Niacin And Related     Statins     Tobramycin      rash    Tricor [Fenofibrate]        Prior to Visit Medications    Medication Sig Taking? Authorizing Provider   doxazosin (CARDURA) 4 MG tablet TAKE 1 TABLET NIGHTLY  Brie Walters MD   carvedilol (COREG) 25 MG tablet TAKE 1 TABLET TWICE DAILY  WITH MEALS  Brie Walters MD   DULoxetine (CYMBALTA) 60 MG extended release capsule Take 1 capsule by mouth daily  Brie Walters MD   lidocaine (LIDODERM) 5 % Place 1 patch onto the skin daily 12 hours on, 12 hours off. RUMA Jules CNP   Handicap Placard MISC by Does not apply route Start 3-17-21 to 3-17-22  Brie Walters MD   Handicap Placard MISC by Does not apply route Duration of 1 year  Patient is high risk for fall  Brie Walters MD   fluticasone-salmeterol (ADVAIR DISKUS) 500-50 MCG/DOSE diskus inhaler USE 1 PUFF EVERY 12 HOURS  Brie Walters MD   HYDROcodone-acetaminophen (NORCO) 5-325 MG per tablet Take 1 tablet by mouth every 8 hours as needed for Pain for up to 14 days.   RUMA Jules CNP   albuterol sulfate  (90 Base) MCG/ACT inhaler Inhale 2 puffs into the lungs every 6 hours as needed for Wheezing  Molly REYES RUMA Bowen - CNP   EPINEPHrine (EPIPEN 2-DE) 0.3 MG/0.3ML SOAJ injection Inject 0.3 mLs into the muscle once for 1 dose Use as directed for allergic reaction. Please give generic  Charlotte Aldridge MD   Multiple Vitamins-Minerals (MULTIVITAMIN PO) Take by mouth daily   Historical Provider, MD       Past Medical History:   Diagnosis Date    Hyperlipidemia     Hypertension     Stroke (cerebrum) ([address] 08/01/2015    TIA (transient ischemic attack)        Past Surgical History:   Procedure Laterality Date    APPENDECTOMY      CATARACT REMOVAL      HAND SURGERY      RIGHT    HERNIA REPAIR Left 5/23/2019    primary repairs  and [facility]    TONSILLECTOMY AND ADENOIDECTOMY      UMBILICAL HERNIA REPAIR N/A 5/23/2019    Primary repair        Family History   Problem Relation Age of Onset    Cancer Mother         BREAST, COLON    High Blood Pressure Father        CareTeam (Including outside providers/suppliers regularly involved in providing care):   Patient Care Team:  Harsh العراقي DO as PCP - General (Pain Medicine)  Pato Shepard MD as PCP - [facility] Empaneled Provider  Malini Rodriguez MD as Consulting Physician (Pain Management)  Rafael Diallo MD as Consulting Physician (Urology)    Wt Readings from Last 3 Encounters:   05/03/21 223 lb (101.2 kg)   03/31/21 227 lb (103 kg)   03/23/21 222 lb (100.7 kg)      No flowsheet data found. There is no height or weight on file to calculate BMI. Based upon direct observation of the patient, evaluation of cognition reveals recent and remote memory intact. Patient's complete Health Risk Assessment and screening values have been reviewed and are found in Flowsheets. The following problems were reviewed today and where indicated follow up appointments were made and/or referrals ordered.     Positive Risk Factor Screenings with Interventions:     Fall Risk:  2 or more falls in past year?: (!) yes (Reports that she has balance issues since her CVA)  Fall with injury in past year?: (!) yes (Reports she slipped in the shower and now has pain to Right arm)  Fall Risk Interventions:    · Patient declines any further evaluation/treatment for this issue     Depression:  PHQ-2 Score: 4  PHQ-9 Total Score: 6    Severity:1-4 = minimal depression, 5-9 = mild depression, 10-14 = moderate depression, 15-19 = moderately severe depression, 20-27 = severe depression  Depression Interventions:  · Patient is seeing PCP for this and recently had her meds adjusted. Offered counselling and patient has declined. General Health and ACP:  General  In general, how would you say your health is?: Fair  In the past 7 days, have you experienced any of the following?  New or Increased Pain, New or Increased Fatigue, Loneliness, Social Isolation, Stress or Anger?: (!) New or Increased Pain (Reports chronic lumbar pain that is worse lately)  Do you get the social and emotional support that you need?: Yes  Do you have a Living Will?: Yes  Advance Directives     Power of  Living Will ACP-Advance Directive ACP-Power of     Not on File Not on File Not on File Not on File      General Health Risk Interventions:  · Pain issues: Patient is current with PM    Health Habits/Nutrition:  Health Habits/Nutrition  Do you exercise for at least 20 minutes 2-3 times per week?: (!) No (Reports unable to exercise because of her poor balance and pain in lumbar spine)  Have you lost any weight without trying in the past 3 months?: No  Do you eat only one meal per day?: (!) Yes  Have you seen the dentist within the past year?: Yes     Health Habits/Nutrition Interventions:  · Inadequate physical activity:  patient agrees to exercise for at least 150 minutes/week, patient is not ready to increase his/her physical activity level at this time    Hearing/Vision:  No exam data present  Hearing/Vision  Do you or your family notice any trouble with your hearing that hasn't been managed with hearing aids?: (!) Yes (Reports that she has hearing aids but does not always use them)  Do you have difficulty driving, watching TV, or doing any of your daily activities because of your eyesight?: No  Have you had an eye exam within the past year?: Yes  Hearing/Vision Interventions:  · Hearing concerns:  patient declines any further evaluation/treatment for hearing issues     ADL:  ADLs  In the past 7 days, did you need help from others to perform any of the following everyday activities? Eating, dressing, grooming, bathing, toileting, or walking/balance?: None  In the past 7 days, did you need help from others to take care of any of the following?  Laundry, housekeeping, banking/finances, shopping, telephone use, food preparation, transportation, or taking medications?: [employer], Housekeeping, Shopping, Food Preparation, Transportation (Reports that  does most of work around the home and takes her to her appointments)  ADL Interventions:  · Patient declines any further evaluation/treatment for this issue    Personalized Preventive Plan   Current Health Maintenance Status  Immunization History   Administered Date(s) Administered    COVID-19, Moderna, PF, 100mcg/0.5mL 02/26/2021, 03/26/2021    Influenza A ([id number]) Vaccine PF IM 02/09/2010    Influenza Vaccine, unspecified formulation 10/12/2015, 10/28/2016    Influenza Virus Vaccine 11/08/2007, 09/24/2014, 10/28/2016, 10/10/2020    Influenza Whole 09/24/2014    Influenza, High Dose (Fluzone 65 yrs and older) 11/01/2017, 10/12/2018, 11/05/2019    Influenza, High-dose, Quadv, 65 yrs +, IM (Fluzone) 10/12/2020    Pneumococcal Conjugate 13-valent (Awvfvpr30) 10/28/2016    Pneumococcal Conjugate 7-valent (Prevnar7) 10/28/2009    Td (Adult), 5 Lf Tetanus Toxoid, Pf (Tenivac, Decavac) 07/17/2019    Zoster Recombinant (Shingrix) 07/17/2019, 10/04/2019        Health Maintenance   Topic Date Due    Hepatitis C screen  Never done    DTaP/Tdap/Td vaccine (1 - Tdap) 04[birth year]13/1963    Pneumococcal 65+ years Vaccine (2 of 2 - PPSV23) 10/28/2017    Annual Wellness Visit (AWV)  Never done    DEXA (modify frequency per FRAX score)  Completed    Flu vaccine  Completed    Shingles Vaccine  Completed    COVID-19 Vaccine  Completed    Hepatitis A vaccine  Aged Out    Hepatitis B vaccine  Aged Out    Hib vaccine  Aged Out    Meningococcal (ACWY) vaccine  Aged Out     Recommendations for Smeet Due: see orders and patient instructions/AVS.  . Recommended screening schedule for the next 5-10 years is provided to the patient in written form: see Patient Instructions/AVS.    Worth Dubin was seen today for medicare awv. Diagnoses and all orders for this visit:    Routine general medical examination at a health care facility               Cora Kebede is a 67 y.o. female being evaluated by a Virtual Visit (phone) encounter to address concerns as mentioned above. A caregiver was present when appropriate. Due to this being a TeleHealth encounter (During [place]-65 public health emergency), evaluation of the following organ systems was limited: Vitals/Constitutional/EENT/Resp/CV/GI//MS/Neuro/Skin/Heme-Lymph-Imm. Pursuant to the emergency declaration under the [facility], [address] [place] [address] authority and the [employer] and [employer] Act, this Virtual Visit was conducted with patient's (and/or legal guardian's) consent, to reduce the patient's risk of exposure to COVID-19 and provide necessary medical care. The patient (and/or legal guardian) has also been advised to contact this office for worsening conditions or problems, and seek emergency medical treatment and/or call 911 if deemed necessary. Patient identification was verified at the start of the visit: Yes    Services were provided through phone to substitute for in-person clinic visit. Patient and provider were located at their individual homes. --RUMA Oleary - CNP on 5/20/2021 at 2:23 PM    An electronic signature was used to authenticate this note.

## 2021-06-26 NOTE — TELEPHONE ENCOUNTER
Rx requested:  Requested Prescriptions     Pending Prescriptions Disp Refills    DULoxetine (CYMBALTA) 60 MG extended release capsule [Pharmacy Med Name: DULOXETINE CAP 60MG DR] 90 capsule 0     Sig: TAKE 1 CAPSULE DAILY       Last Office Visit:   5/3/2021        Next Visit Date:  Future Appointments   Date Time Provider Guero Bradley   9/3/2021  2:00 PM Reynold Snellen, MD [address] [place] [address]

## 2021-06-28 RX ORDER — DULOXETIN HYDROCHLORIDE 60 MG/1
CAPSULE, DELAYED RELEASE ORAL
Qty: 90 CAPSULE | Refills: 0 | Status: SHIPPED | OUTPATIENT
Start: 2021-06-28 | End: 2021-09-03 | Stop reason: SDUPTHER

## 2021-09-03 ENCOUNTER — OFFICE VISIT (OUTPATIENT)
Dept: FAMILY MEDICINE CLINIC | Age: 76
End: 2021-09-03
Payer: MEDICARE

## 2021-09-03 VITALS
HEIGHT: 65 IN | OXYGEN SATURATION: 98 % | WEIGHT: 226 LBS | RESPIRATION RATE: 14 BRPM | BODY MASS INDEX: 37.65 KG/M2 | DIASTOLIC BLOOD PRESSURE: 82 MMHG | HEART RATE: 64 BPM | SYSTOLIC BLOOD PRESSURE: 120 MMHG | TEMPERATURE: 98 F

## 2021-09-03 DIAGNOSIS — J45.40 MODERATE PERSISTENT ASTHMA WITHOUT COMPLICATION: ICD-10-CM

## 2021-09-03 DIAGNOSIS — F33.2 SEVERE EPISODE OF RECURRENT MAJOR DEPRESSIVE DISORDER, WITHOUT PSYCHOTIC FEATURES (HCC): ICD-10-CM

## 2021-09-03 DIAGNOSIS — E78.00 PURE HYPERCHOLESTEROLEMIA: ICD-10-CM

## 2021-09-03 DIAGNOSIS — I10 ESSENTIAL HYPERTENSION: Primary | ICD-10-CM

## 2021-09-03 PROCEDURE — 4040F PNEUMOC VAC/ADMIN/RCVD: CPT | Performed by: FAMILY MEDICINE

## 2021-09-03 PROCEDURE — 99214 OFFICE O/P EST MOD 30 MIN: CPT | Performed by: FAMILY MEDICINE

## 2021-09-03 PROCEDURE — 1090F PRES/ABSN URINE INCON ASSESS: CPT | Performed by: FAMILY MEDICINE

## 2021-09-03 PROCEDURE — G8417 CALC BMI ABV UP PARAM F/U: HCPCS | Performed by: FAMILY MEDICINE

## 2021-09-03 PROCEDURE — 1123F ACP DISCUSS/DSCN MKR DOCD: CPT | Performed by: FAMILY MEDICINE

## 2021-09-03 PROCEDURE — G8427 DOCREV CUR MEDS BY ELIG CLIN: HCPCS | Performed by: FAMILY MEDICINE

## 2021-09-03 PROCEDURE — 1036F TOBACCO NON-USER: CPT | Performed by: FAMILY MEDICINE

## 2021-09-03 PROCEDURE — G8399 PT W/DXA RESULTS DOCUMENT: HCPCS | Performed by: FAMILY MEDICINE

## 2021-09-03 RX ORDER — DOXAZOSIN MESYLATE 4 MG/1
TABLET ORAL
Qty: 90 TABLET | Refills: 1 | Status: SHIPPED | OUTPATIENT
Start: 2021-09-03 | End: 2022-05-03

## 2021-09-03 RX ORDER — DULOXETIN HYDROCHLORIDE 60 MG/1
CAPSULE, DELAYED RELEASE ORAL
Qty: 90 CAPSULE | Refills: 1 | Status: SHIPPED | OUTPATIENT
Start: 2021-09-03 | End: 2022-03-22

## 2021-09-03 ASSESSMENT — ENCOUNTER SYMPTOMS
VOMITING: 0
DIARRHEA: 0
COUGH: 0

## 2021-09-03 NOTE — PROGRESS NOTES
Subjective:      Patient ID: Cynthia Hawk is a 67 y.o. female. HPI  Here in follow up for depression and htn and lipids and asthma. Rare use of albuterol. Review of Systems   Constitutional: Negative for chills and fever. Respiratory: Negative for cough. Gastrointestinal: Negative for diarrhea and vomiting. Skin: Negative for rash. Neurological: Negative for weakness. Treatment Adherence:   Medication compliance:  compliant most of the time  Diet compliance:  compliant most of the time  Weight trend: stable  Current exercise: no regular exercise  Barriers: none    Hypertension:  Home blood pressure monitoring: Yes - . She is adherent to a low sodium diet. Patient denies chest pain. Antihypertensive medication side effects: no medication side effects noted. Use of agents associated with hypertension: none. Sodium (mEq/L)   Date Value   08/23/2021 139    BUN (mg/dL)   Date Value   08/23/2021 11    Glucose (mg/dL)   Date Value   08/23/2021 94   05/24/2018 107 (H)      Potassium (mEq/L)   Date Value   08/23/2021 4.0    CREATININE (mg/dL)   Date Value   08/23/2021 0.62         Hyperlipidemia:  No new myalgias or GI upset on none.      Lab Results   Component Value Date    CHOL 210 (H) 08/23/2021    TRIG 182 (H) 08/23/2021    HDL 44 08/23/2021    LDLCALC 130 (H) 08/23/2021     Lab Results   Component Value Date    ALT 15 08/23/2021    AST 25 08/23/2021        Reviewed allergy, medical, social, surgical, family and med list changes and updated   Files--reviewed blood work with slightly elevated lipids-patient declines tx   Social History     Socioeconomic History    Marital status:      Spouse name: Not on file    Number of children: Not on file    Years of education: Not on file    Highest education level: Not on file   Occupational History    Not on file   Tobacco Use    Smoking status: Never Smoker    Smokeless tobacco: Never Used   Substance and Sexual Activity    Alcohol use: No    Drug use: No    Sexual activity: Not on file   Other Topics Concern    Not on file   Social History Narrative    ** Merged History Encounter **          Social Determinants of Health     Financial Resource Strain: Low Risk     Difficulty of Paying Living Expenses: Not hard at all   Food Insecurity: No Food Insecurity    Worried About Running Out of Food in the Last Year: Never true    Hali of Food in the Last Year: Never true   Transportation Needs: No Transportation Needs    Lack of Transportation (Medical): No    Lack of Transportation (Non-Medical): No   Physical Activity: Inactive    Days of Exercise per Week: 0 days    Minutes of Exercise per Session: 0 min   Stress: Stress Concern Present    Feeling of Stress : Very much   Social Connections:     Frequency of Communication with Friends and Family:     Frequency of Social Gatherings with Friends and Family:     Attends Nondenominational Services:     Active Member of Clubs or Organizations:     Attends Club or Organization Meetings:     Marital Status:    Intimate Partner Violence:     Fear of Current or Ex-Partner:     Emotionally Abused:     Physically Abused:     Sexually Abused:      Current Outpatient Medications   Medication Sig Dispense Refill    carvedilol (COREG) 25 MG tablet TAKE 1 TABLET TWICE DAILY  WITH MEALS 180 tablet 0    fluticasone-salmeterol (ADVAIR DISKUS) 500-50 MCG/DOSE diskus inhaler USE 1 INHALATION ORALLY    EVERY 12 HOURS 3 Inhaler 1    DULoxetine (CYMBALTA) 60 MG extended release capsule TAKE 1 CAPSULE DAILY 90 capsule 0    doxazosin (CARDURA) 4 MG tablet TAKE 1 TABLET NIGHTLY 7 tablet 0    lidocaine (LIDODERM) 5 % Place 1 patch onto the skin daily 12 hours on, 12 hours off.  30 patch 0    Handicap Placard MISC by Does not apply route Start 3-17-21 to 3-17-22 1 each 0    Handicap Placard MISC by Does not apply route Duration of 1 year  Patient is high risk for fall 1 each 0    albuterol sulfate  (90 Base) MCG/ACT inhaler Inhale 2 puffs into the lungs every 6 hours as needed for Wheezing 1 Inhaler 0    Multiple Vitamins-Minerals (MULTIVITAMIN PO) Take by mouth daily       HYDROcodone-acetaminophen (NORCO) 5-325 MG per tablet Take 1 tablet by mouth every 8 hours as needed for Pain for up to 14 days. 42 tablet 0    EPINEPHrine (EPIPEN 2-DE) 0.3 MG/0.3ML SOAJ injection Inject 0.3 mLs into the muscle once for 1 dose Use as directed for allergic reaction. Please give generic 0.3 mL 0     Current Facility-Administered Medications   Medication Dose Route Frequency Provider Last Rate Last Admin    betamethasone acetate-betamethasone sodium phosphate (CELESTONE) injection 6 mg  6 mg Intra-Lesional Once Smita Acosta MD         Family History   Problem Relation Age of Onset    Cancer Mother         BREAST, COLON    High Blood Pressure Father      Past Medical History:   Diagnosis Date    Hyperlipidemia     Hypertension     Stroke (cerebrum) ([facility] [address] 08/01/2015    TIA (transient ischemic attack)      Objective:   Physical Exam  Neck:no carotid bruits. No masses. No adenopathy. No thyroid asymmetry. Lungs:clear and equal breath sounds. No wheezes or rales. Heart:rate reg. No murmur. No gallops   Pulses:Radials 2+ equal               Poster tib 1+ equal  Extremities:no edema in either leg  Gen: In no acute distress  Abdomen; B.S present. Soft  Non tender. No hepatosplenomegaly. No masses   Patient with appropriate affect. Alert    Thought content appropriate  Good eye contact    Assessment / Plan:       Diagnosis Orders   1. Essential hypertension  doxazosin (CARDURA) 4 MG tablet   2. Pure hypercholesterolemia     3. Moderate persistent asthma without complication     4.  Severe episode of recurrent major depressive disorder, without psychotic features ([facility] [address]          Orders Placed This Encounter   Medications    doxazosin (CARDURA) 4 MG tablet     Sig: TAKE 1 TABLET NIGHTLY     Dispense:  90 tablet     Refill:  1    DULoxetine (CYMBALTA) 60 MG extended release capsule     Sig: TAKE 1 CAPSULE DAILY     Dispense:  90 capsule     Refill:  1   f/u in 6 months

## 2021-10-02 ENCOUNTER — NURSE TRIAGE (OUTPATIENT)
Dept: OTHER | Facility: CLINIC | Age: 76
End: 2021-10-02

## 2021-10-02 NOTE — TELEPHONE ENCOUNTER
Received call from [address] [facility]. Brief description of triage: Jose Alfredo saba. 205/96, pulse was 62. Is dizzy, and broken out in a cold sweat, feels nauseated. Has been dizzy for 4 weeks now and is on BP medications twice a day. 179/91, hr 60    Triage indicates for patient to ED now, patient and son (who is a paramedic) are on the phone. They want to wait until later to see how she is feeling before taking her to the ED. Informed them of the importance of taking her to the ED now. Did transfer to the schedule for an appointment for next week for the dizziness she has had for weeks. Care advice provided, patient verbalizes understanding; denies any other questions or concerns; instructed to call back for any new or worsening symptoms. Writer provided warm transfer to [facility] for appointment scheduling. Attention Provider: Thank you for allowing me to participate in the care of your patient. The patient was connected to triage in response to information provided to the [facility]. Please do not respond through this encounter as the response is not directed to a shared pool. Reason for Disposition   [3] Systolic BP  >= 458 OR Diastolic >= 298 AND [0] cardiac or neurologic symptoms (e.g., chest pain, difficulty breathing, unsteady gait, blurred vision)    Answer Assessment - Initial Assessment Questions  1. BLOOD PRESSURE: \"What is the blood pressure? \" \"Did you take at least two measurements 5 minutes apart?\"       2. ONSET: \"When did you take your blood pressure? \"       Upper arm automatic  3. HOW: \"How did you obtain the blood pressure? \" (e.g., visiting nurse, automatic home BP monitor)      automatic  4. HISTORY: \"Do you have a history of high blood pressure? \"      yes  5. MEDICATIONS: Milli Perrin you taking any medications for blood pressure? \"  Yes, not missed any does   \"Have you missed any doses recently? \"      Hasn't missed doses  6. OTHER SYMPTOMS: \"Do you have any symptoms? \" (e.g., headache, chest pain, blurred vision, difficulty breathing, weakness)      Dizzy, she has a headache  7. PREGNANCY: \"Is there any chance you are pregnant? \" \"When was your last menstrual period? \"      n/a    Protocols used: HIGH BLOOD PRESSURE-ADULT-AH

## 2021-10-04 ENCOUNTER — OFFICE VISIT (OUTPATIENT)
Dept: FAMILY MEDICINE CLINIC | Age: 76
End: 2021-10-04
Payer: MEDICARE

## 2021-10-04 VITALS
OXYGEN SATURATION: 97 % | SYSTOLIC BLOOD PRESSURE: 140 MMHG | TEMPERATURE: 97.4 F | HEIGHT: 65 IN | HEART RATE: 62 BPM | BODY MASS INDEX: 37.55 KG/M2 | WEIGHT: 225.4 LBS | DIASTOLIC BLOOD PRESSURE: 80 MMHG

## 2021-10-04 DIAGNOSIS — Z23 NEED FOR VACCINATION: ICD-10-CM

## 2021-10-04 DIAGNOSIS — R42 VERTIGO: ICD-10-CM

## 2021-10-04 DIAGNOSIS — R51.9 ACUTE NONINTRACTABLE HEADACHE, UNSPECIFIED HEADACHE TYPE: ICD-10-CM

## 2021-10-04 DIAGNOSIS — R42 DIZZINESS: Primary | ICD-10-CM

## 2021-10-04 PROCEDURE — G8484 FLU IMMUNIZE NO ADMIN: HCPCS | Performed by: NURSE PRACTITIONER

## 2021-10-04 PROCEDURE — 1036F TOBACCO NON-USER: CPT | Performed by: NURSE PRACTITIONER

## 2021-10-04 PROCEDURE — 90694 VACC AIIV4 NO PRSRV 0.5ML IM: CPT | Performed by: NURSE PRACTITIONER

## 2021-10-04 PROCEDURE — G8399 PT W/DXA RESULTS DOCUMENT: HCPCS | Performed by: NURSE PRACTITIONER

## 2021-10-04 PROCEDURE — G0008 ADMIN INFLUENZA VIRUS VAC: HCPCS | Performed by: NURSE PRACTITIONER

## 2021-10-04 PROCEDURE — G8427 DOCREV CUR MEDS BY ELIG CLIN: HCPCS | Performed by: NURSE PRACTITIONER

## 2021-10-04 PROCEDURE — 4040F PNEUMOC VAC/ADMIN/RCVD: CPT | Performed by: NURSE PRACTITIONER

## 2021-10-04 PROCEDURE — G8417 CALC BMI ABV UP PARAM F/U: HCPCS | Performed by: NURSE PRACTITIONER

## 2021-10-04 PROCEDURE — 99213 OFFICE O/P EST LOW 20 MIN: CPT | Performed by: NURSE PRACTITIONER

## 2021-10-04 PROCEDURE — 1123F ACP DISCUSS/DSCN MKR DOCD: CPT | Performed by: NURSE PRACTITIONER

## 2021-10-04 PROCEDURE — 1090F PRES/ABSN URINE INCON ASSESS: CPT | Performed by: NURSE PRACTITIONER

## 2021-10-04 RX ORDER — MECLIZINE HYDROCHLORIDE 25 MG/1
25 TABLET ORAL 3 TIMES DAILY PRN
Qty: 30 TABLET | Refills: 0 | Status: SHIPPED | OUTPATIENT
Start: 2021-10-04 | End: 2021-10-14

## 2021-10-04 ASSESSMENT — ENCOUNTER SYMPTOMS
WHEEZING: 0
NAUSEA: 1
SHORTNESS OF BREATH: 0
SINUS PRESSURE: 1
COUGH: 0
SORE THROAT: 0
VOMITING: 0

## 2021-10-04 NOTE — PATIENT INSTRUCTIONS
Patient Education        Vertigo: Exercises  Introduction  Here are some examples of exercises for you to try. The exercises may be suggested for a condition or for rehabilitation. Start each exercise slowly. Ease off the exercises if you start to have pain. You will be told when to start these exercises and which ones will work best for you. How to do the exercises  Exercise 1    1. Stand with a chair in front of you and a wall behind you. If you begin to fall, you may use them for support. 2. Stand with your feet together and your arms at your sides. 3. Move your head up and down 10 times. Exercise 2    1. Move your head side to side 10 times. Exercise 3    1. Move your head diagonally up and down 10 times. Exercise 4    1. Move your head diagonally up and down 10 times on the other side. Follow-up care is a key part of your treatment and safety. Be sure to make and go to all appointments, and call your doctor if you are having problems. It's also a good idea to know your test results and keep a list of the medicines you take. Where can you learn more? Go to [email or web address] and sign in to your [employer] account. Enter F349 in the Clue App box to learn more about \"Vertigo: Exercises. \"     If you do not have an account, please click on the \"Sign Up Now\" link. Current as of: December 2, 2020               Content Version: 13.0  © 3849-1912 Healthwise, Incorporated. Care instructions adapted under license by [facility] ([facility]). If you have questions about a medical condition or this instruction, always ask your healthcare professional. [address] any warranty or liability for your use of this information.

## 2021-10-04 NOTE — PROGRESS NOTES
Vaccine Information Sheet, \"Influenza - Inactivated\"  given to Filomena Russell, or parent/legal guardian of  Filomena Russell and verbalized understanding. Patient responses:    Have you ever had a reaction to a flu vaccine? No  Are you able to eat eggs without adverse effects? Yes  Do you have any current illness? No  Have you ever had Guillian Gladstone Syndrome? No    Flu vaccine given per order. Please see immunization tab.

## 2021-10-04 NOTE — PROGRESS NOTES
 Drug use: No    Sexual activity: Not on file   Other Topics Concern    Not on file   Social History Narrative    ** Merged History Encounter **          Social Determinants of Health     Financial Resource Strain: Low Risk     Difficulty of Paying Living Expenses: Not hard at all   Food Insecurity: No Food Insecurity    Worried About Running Out of Food in the Last Year: Never true    Hali of Food in the Last Year: Never true   Transportation Needs: No Transportation Needs    Lack of Transportation (Medical): No    Lack of Transportation (Non-Medical):  No   Physical Activity: Inactive    Days of Exercise per Week: 0 days    Minutes of Exercise per Session: 0 min   Stress: Stress Concern Present    Feeling of Stress : Very much   Social Connections:     Frequency of Communication with Friends and Family:     Frequency of Social Gatherings with Friends and Family:     Attends Buddhist Services:     Active Member of Clubs or Organizations:     Attends Club or Organization Meetings:     Marital Status:    Intimate Partner Violence:     Fear of Current or Ex-Partner:     Emotionally Abused:     Physically Abused:     Sexually Abused:      Current Outpatient Medications on File Prior to Visit   Medication Sig Dispense Refill    doxazosin (CARDURA) 4 MG tablet TAKE 1 TABLET NIGHTLY 90 tablet 1    DULoxetine (CYMBALTA) 60 MG extended release capsule TAKE 1 CAPSULE DAILY 90 capsule 1    carvedilol (COREG) 25 MG tablet TAKE 1 TABLET TWICE DAILY  WITH MEALS 180 tablet 0    fluticasone-salmeterol (ADVAIR DISKUS) 500-50 MCG/DOSE diskus inhaler USE 1 INHALATION ORALLY    EVERY 12 HOURS 3 Inhaler 1    Handicap Placard MISC by Does not apply route Start 3-17-21 to 3-17-22 1 each 0    albuterol sulfate  (90 Base) MCG/ACT inhaler Inhale 2 puffs into the lungs every 6 hours as needed for Wheezing 1 Inhaler 0    Multiple Vitamins-Minerals (MULTIVITAMIN PO) Take by mouth daily       lidocaine (LIDODERM) 5 % Place 1 patch onto the skin daily 12 hours on, 12 hours off. (Patient not taking: Reported on 10/4/2021) 30 patch 0    HYDROcodone-acetaminophen (NORCO) 5-325 MG per tablet Take 1 tablet by mouth every 8 hours as needed for Pain for up to 14 days. 42 tablet 0    EPINEPHrine (EPIPEN 2-DE) 0.3 MG/0.3ML SOAJ injection Inject 0.3 mLs into the muscle once for 1 dose Use as directed for allergic reaction. Please give generic 0.3 mL 0     Current Facility-Administered Medications on File Prior to Visit   Medication Dose Route Frequency Provider Last Rate Last Admin    betamethasone acetate-betamethasone sodium phosphate (CELESTONE) injection 6 mg  6 mg Intra-Lesional Once Lawyer Michoacano MD           Allergies:  Floxin [ofloxacin], Praluent [alirocumab], Questran [cholestyramine], Lidocaine oint & men-meth sal, Lovaza [omega-3-acid ethyl esters], Niacin and related, Statins, Tobramycin, and Tricor [fenofibrate]    Review of Systems   Constitutional: Negative for chills, fatigue and fever. HENT: Positive for congestion and sinus pressure. Negative for sore throat. Respiratory: Negative for cough, shortness of breath and wheezing. Cardiovascular: Negative for chest pain and palpitations. Gastrointestinal: Positive for nausea. Negative for vomiting. Skin: Negative for rash. Neurological: Positive for dizziness, vertigo and headaches. Objective:   BP (!) 140/80 (Site: Right Upper Arm, Position: Sitting, Cuff Size: Medium Adult)   Pulse 62   Temp 97.4 °F (36.3 °C) (Temporal)   Ht 5' 5\" (1.651 m)   Wt 225 lb 6.4 oz (102.2 kg)   LMP  (LMP Unknown)   SpO2 97%   BMI 37.51 kg/m²     Physical Exam  Constitutional:       Appearance: She is well-developed. HENT:      Head: Normocephalic.       Right Ear: Tympanic membrane, ear canal and external ear normal.      Left Ear: Tympanic membrane, ear canal and external ear normal.      Nose: Nose normal.      Mouth/Throat:      Mouth: Mucous membranes are moist.      Pharynx: Oropharynx is clear. Uvula midline. Eyes:      General:         Right eye: No discharge. Left eye: No discharge. Conjunctiva/sclera: Conjunctivae normal.   Cardiovascular:      Rate and Rhythm: Normal rate and regular rhythm. Heart sounds: Normal heart sounds. Pulmonary:      Effort: Pulmonary effort is normal. No respiratory distress. Breath sounds: Normal breath sounds. Musculoskeletal:      Cervical back: Normal range of motion. Lymphadenopathy:      Cervical: No cervical adenopathy. Skin:     General: Skin is warm and dry. Neurological:      Mental Status: She is alert and oriented to person, place, and time. Psychiatric:         Mood and Affect: Mood normal.         Behavior: Behavior normal.         Assessment:          Diagnosis Orders   1. Dizziness  meclizine (ANTIVERT) 25 MG tablet    CT HEAD WO CONTRAST   2. Need for vaccination  INFLUENZA, QUADV, ADJUVANTED, 65 YRS =, IM, PF, PREFILL SYR, 0.5ML (FLUAD)   3. Acute nonintractable headache, unspecified headache type  CT HEAD WO CONTRAST   4. Vertigo  meclizine (ANTIVERT) 25 MG tablet       Plan:      Orders Placed This Encounter   Procedures    CT HEAD WO CONTRAST     Standing Status:   Future     Standing Expiration Date:   10/4/2022     Order Specific Question:   Reason for exam:     Answer:   headache, dizziness x3 weeks    INFLUENZA, QUADV, ADJUVANTED, 65 YRS =, IM, PF, PREFILL SYR, 0.5ML (FLUAD)          Orders Placed This Encounter   Medications    meclizine (ANTIVERT) 25 MG tablet     Sig: Take 1 tablet by mouth 3 times daily as needed for Dizziness     Dispense:  30 tablet     Refill:  0       Return if symptoms worsen or fail to improve. Medication as prescribed. Symptoms currently resolved. CT of head. Reviewed with the patient: current clinicalstatus, medications, activities and diet.      Side effects, adverse effects of the medication prescribedtoday, as well as treatment plan/ rationale and result expectations have been discussedwith the patient who expresses understanding and desires to proceed. Close follow upto evaluate treatment results and for coordination of care. I have reviewedthe patient's medical history in detail and updated the computerized patient record.     Flower De Luna, RUMA - CNP

## 2021-10-20 ENCOUNTER — TELEPHONE (OUTPATIENT)
Dept: PAIN MANAGEMENT | Age: 76
End: 2021-10-20

## 2021-10-20 NOTE — TELEPHONE ENCOUNTER
Patient asking if Dr. Neftali Carranza will order her a right and left RFA. Patient received these last on 3/29/21 and 4/12/21.

## 2021-10-26 ENCOUNTER — OFFICE VISIT (OUTPATIENT)
Dept: PAIN MANAGEMENT | Age: 76
End: 2021-10-26
Payer: MEDICARE

## 2021-10-26 DIAGNOSIS — M47.817 SPONDYLOSIS OF LUMBOSACRAL JOINT WITHOUT MYELOPATHY: ICD-10-CM

## 2021-10-26 DIAGNOSIS — G89.4 CHRONIC PAIN SYNDROME: ICD-10-CM

## 2021-10-26 DIAGNOSIS — M17.10 KNEE ARTHROPATHY: ICD-10-CM

## 2021-10-26 DIAGNOSIS — M47.817 LUMBOSACRAL SPONDYLOSIS WITHOUT MYELOPATHY: Primary | ICD-10-CM

## 2021-10-26 PROCEDURE — 64636 DESTROY L/S FACET JNT ADDL: CPT | Performed by: PAIN MEDICINE

## 2021-10-26 PROCEDURE — 64635 DESTROY LUMB/SAC FACET JNT: CPT | Performed by: PAIN MEDICINE

## 2021-10-26 RX ORDER — HYDROCODONE BITARTRATE AND ACETAMINOPHEN 5; 325 MG/1; MG/1
1 TABLET ORAL 2 TIMES DAILY PRN
Qty: 30 TABLET | Refills: 0 | Status: SHIPPED | OUTPATIENT
Start: 2021-10-26 | End: 2022-09-07 | Stop reason: SDUPTHER

## 2021-10-26 RX ORDER — BETAMETHASONE SODIUM PHOSPHATE AND BETAMETHASONE ACETATE 3; 3 MG/ML; MG/ML
6 INJECTION, SUSPENSION INTRA-ARTICULAR; INTRALESIONAL; INTRAMUSCULAR; SOFT TISSUE ONCE
Status: COMPLETED | OUTPATIENT
Start: 2021-10-26 | End: 2021-10-26

## 2021-10-26 RX ORDER — LIDOCAINE HYDROCHLORIDE 10 MG/ML
10 INJECTION, SOLUTION EPIDURAL; INFILTRATION; INTRACAUDAL; PERINEURAL ONCE
Status: COMPLETED | OUTPATIENT
Start: 2021-10-26 | End: 2021-10-26

## 2021-10-26 RX ADMIN — LIDOCAINE HYDROCHLORIDE 10 MG: 10 INJECTION, SOLUTION EPIDURAL; INFILTRATION; INTRACAUDAL; PERINEURAL at 11:03

## 2021-10-26 RX ADMIN — BETAMETHASONE SODIUM PHOSPHATE AND BETAMETHASONE ACETATE 6 MG: 3; 3 INJECTION, SUSPENSION INTRA-ARTICULAR; INTRALESIONAL; INTRAMUSCULAR; SOFT TISSUE at 11:04

## 2021-10-26 NOTE — PROGRESS NOTES
[facility]) Physicians  Neurosurgery and Pain [address] [facility]: [phone]  F: [phone]      Lumbar Radio Frequency Ablation     Provider: Sterling Ramírez DO          Patient Name: Rhonda Baxter : [id number]        Date: 10/26/2021      Rhonda Baxter is here today for interventional pain management. Standard ASIPP guidelines were followed and sterile technique used. Area was cleaned with Betadine x3. Informed consent was obtained. Fluoroscopic guidance was used for this procedure. Multiple views of fluoroscopy were used during procedure to assist with needle placement. Appropriate sized RF 10mm active tip needle was used and advance to appropriate anatomic location. There was appropriate multifidus contraction noted with motor stimulation at 2 Hz between 0.5-1.5 volts. No limb or gluteal contraction was noted taking it up to 3.5 volts. Prior to lesioning at 80 degrees Celsius for 90 seconds, approximately 0.75mg/1mg of Celestone and ½ cc of 1% preservative free Lidocaine was injected. Impedance was between 200-500 ohms during the procedure. Patient tolerated the procedure well, no obvious complications occurred during the procedure. Patient was appropriately monitored and discharged home in stable condition with their usual motor strength. Post Op instructions were given to patient.           [x] Bilateral [] T11 [] L1 [] S1     [] T12 [] L2 [] S2    [] Right  [x] L3 [] S3      [x] L4 [] S4    [] Left  [x] L5                              Sterling Ramírez DO

## 2021-11-17 DIAGNOSIS — I10 ESSENTIAL HYPERTENSION: ICD-10-CM

## 2021-11-17 RX ORDER — CARVEDILOL 25 MG/1
TABLET ORAL
Qty: 180 TABLET | Refills: 0 | Status: SHIPPED | OUTPATIENT
Start: 2021-11-17 | End: 2022-02-01 | Stop reason: SDUPTHER

## 2021-11-29 ENCOUNTER — OFFICE VISIT (OUTPATIENT)
Dept: PAIN MANAGEMENT | Age: 76
End: 2021-11-29
Payer: MEDICARE

## 2021-11-29 VITALS — WEIGHT: 220 LBS | HEIGHT: 65 IN | BODY MASS INDEX: 36.65 KG/M2 | TEMPERATURE: 97.4 F

## 2021-11-29 DIAGNOSIS — M47.817 SPONDYLOSIS OF LUMBOSACRAL JOINT WITHOUT MYELOPATHY: ICD-10-CM

## 2021-11-29 DIAGNOSIS — M17.10 KNEE ARTHROPATHY: ICD-10-CM

## 2021-11-29 DIAGNOSIS — G89.4 CHRONIC PAIN SYNDROME: ICD-10-CM

## 2021-11-29 PROBLEM — M25.561 CHRONIC PAIN OF RIGHT KNEE: Status: ACTIVE | Noted: 2020-01-21

## 2021-11-29 PROBLEM — Z96.651 STATUS POST TOTAL RIGHT KNEE REPLACEMENT: Status: ACTIVE | Noted: 2019-12-26

## 2021-11-29 PROBLEM — M17.11 PRIMARY OSTEOARTHRITIS OF RIGHT KNEE: Status: ACTIVE | Noted: 2018-06-19

## 2021-11-29 PROBLEM — G89.29 CHRONIC PAIN OF RIGHT KNEE: Status: ACTIVE | Noted: 2020-01-21

## 2021-11-29 PROCEDURE — G8427 DOCREV CUR MEDS BY ELIG CLIN: HCPCS | Performed by: NURSE PRACTITIONER

## 2021-11-29 PROCEDURE — 1090F PRES/ABSN URINE INCON ASSESS: CPT | Performed by: NURSE PRACTITIONER

## 2021-11-29 PROCEDURE — 4040F PNEUMOC VAC/ADMIN/RCVD: CPT | Performed by: NURSE PRACTITIONER

## 2021-11-29 PROCEDURE — 1123F ACP DISCUSS/DSCN MKR DOCD: CPT | Performed by: NURSE PRACTITIONER

## 2021-11-29 PROCEDURE — 1036F TOBACCO NON-USER: CPT | Performed by: NURSE PRACTITIONER

## 2021-11-29 PROCEDURE — G8484 FLU IMMUNIZE NO ADMIN: HCPCS | Performed by: NURSE PRACTITIONER

## 2021-11-29 PROCEDURE — G8417 CALC BMI ABV UP PARAM F/U: HCPCS | Performed by: NURSE PRACTITIONER

## 2021-11-29 PROCEDURE — G8399 PT W/DXA RESULTS DOCUMENT: HCPCS | Performed by: NURSE PRACTITIONER

## 2021-11-29 PROCEDURE — 99213 OFFICE O/P EST LOW 20 MIN: CPT | Performed by: NURSE PRACTITIONER

## 2021-11-29 RX ORDER — HYDROCODONE BITARTRATE AND ACETAMINOPHEN 5; 325 MG/1; MG/1
1 TABLET ORAL 2 TIMES DAILY PRN
Qty: 30 TABLET | Refills: 0 | Status: CANCELLED | OUTPATIENT
Start: 2021-11-29 | End: 2021-12-14

## 2021-11-29 ASSESSMENT — ENCOUNTER SYMPTOMS
GASTROINTESTINAL NEGATIVE: 1
COUGH: 0
EYES NEGATIVE: 1
SHORTNESS OF BREATH: 0
TROUBLE SWALLOWING: 0
DIARRHEA: 0
BACK PAIN: 1
CONSTIPATION: 0

## 2021-11-29 NOTE — PATIENT INSTRUCTIONS
Patient Education        Sacroiliac Pain: Exercises  Introduction  Here are some examples of exercises for you to try. The exercises may be suggested for a condition or for rehabilitation. Start each exercise slowly. Ease off the exercises if you start to have pain. You will be told when to start these exercises and which ones will work best for you. How to do the exercises  Knee-to-chest stretch    1. Do not do the knee-to-chest exercise if it causes or increases back or leg pain. 2. Lie on your back with your knees bent and your feet flat on the floor. You can put a small pillow under your head and neck if it is more comfortable. 3. Grasp your hands under one knee and bring the knee to your chest, keeping the other foot flat on the floor. 4. Keep your lower back pressed to the floor. Hold for at least 15 to 30 seconds. 5. Relax and lower the knee to the starting position. Repeat with the other leg. 6. Repeat 2 to 4 times with each leg. 7. To get more stretch, keep your other leg flat on the floor while pulling your knee to your chest.  Bridging    1. Lie on your back with both knees bent. Your knees should be bent about 90 degrees. 2. Tighten your belly muscles by pulling in your belly button toward your spine. Then push your feet into the floor, squeeze your buttocks, and lift your hips off the floor until your shoulders, hips, and knees are all in a straight line. 3. Hold for about 6 seconds as you continue to breathe normally, and then slowly lower your hips back down to the floor and rest for up to 10 seconds. 4. Repeat 8 to 12 times. Hip extension    1. Get down on your hands and knees on the floor. 2. Keeping your back and neck straight, lift one leg straight out behind you. When you lift your leg, keep your hips level. Don't let your back twist, and don't let your hip drop toward the floor. 3. Hold for 6 seconds. Repeat 8 to 12 times with each leg.   4. If you feel steady and strong when you do this exercise, you can make it more difficult. To do this, when you lift your leg, also lift the opposite arm straight out in front of you. For example, lift the left leg and the right arm at the same time. (This is sometimes called the \"bird dog exercise. \") Hold for 6 seconds, and repeat 8 to 12 times on each side. Clamshell    1. Lie on your side with a pillow under your head. Keep your feet and knees together and your knees bent. 2. Raise your top knee, but keep your feet together. Do not let your hips roll back. Your legs should open up like a clamshell. 3. Hold for 6 seconds. 4. Slowly lower your knee back down. Rest for 10 seconds. 5. Repeat 8 to 12 times. 6. Switch to your other side and repeat steps 1 through 5. Hamstring wall stretch    1. Lie on your back in a doorway, with one leg through the open door. 2. Slide your affected leg up the wall to straighten your knee. You should feel a gentle stretch down the back of your leg. 3. Hold the stretch for at least 1 minute to begin. Then try to lengthen the time you hold the stretch to as long as 6 minutes. 4. Switch legs, and repeat steps 1 through 3.  5. Repeat 2 to 4 times. 6. If you do not have a place to do this exercise in a doorway, there is another way to do it:  7. Lie on your back, and bend one knee. 8. Loop a towel under the ball and toes of that foot, and hold the ends of the towel in your hands. 9. Straighten your knee, and slowly pull back on the towel. You should feel a gentle stretch down the back of your leg. 10. Switch legs, and repeat steps 1 through 3.  11. Repeat 2 to 4 times. 1. Do not arch your back. 2. Do not bend either knee. 3. Keep one heel touching the floor and the other heel touching the wall. Do not point your toes. Lower abdominal strengthening    1. Lie on your back with your knees bent and your feet flat on the floor. 2. Tighten your belly muscles by pulling your belly button in toward your spine.   3. Lift one foot off the floor and bring your knee toward your chest, so that your knee is straight above your hip and your leg is bent like the letter \"L. \"  4. Lift the other knee up to the same position. 5. Lower one leg at a time to the starting position. 6. Keep alternating legs until you have lifted each leg 8 to 12 times. 7. Be sure to keep your belly muscles tight and your back still as you are moving your legs. Be sure to breathe normally. Piriformis stretch    1. Lie on your back with your legs straight. 2. Lift your affected leg, and bend your knee. With your opposite hand, reach across your body, and then gently pull your knee toward your opposite shoulder. 3. Hold the stretch for 15 to 30 seconds. 4. Switch legs and repeat steps 1 through 3.  5. Repeat 2 to 4 times. Follow-up care is a key part of your treatment and safety. Be sure to make and go to all appointments, and call your doctor if you are having problems. It's also a good idea to know your test results and keep a list of the medicines you take. Where can you learn more? Go to https://XSteach.compeMark One.LiveSafe. org and sign in to your [employer] account. Enter I398 in the [facility] box to learn more about \"Sacroiliac Pain: Exercises. \"     If you do not have an account, please click on the \"Sign Up Now\" link. Current as of: July 1, 2021               Content Version: 13.0  © 2006-2021 Healthwise, Incorporated. Care instructions adapted under license by [facility] ([facility]). If you have questions about a medical condition or this instruction, always ask your healthcare professional. [place] [address] any warranty or liability for your use of this information. Patient Education        Back Stretches: Exercises  Introduction  Here are some examples of exercises for stretching your back. Start each exercise slowly. Ease off the exercise if you start to have pain.   Your doctor or physical therapist will tell you when you can start these exercises and which ones will work best for you. How to do the exercises  Overhead stretch    8. Stand comfortably with your feet shoulder-width apart. 9. Looking straight ahead, raise both arms over your head and reach toward the ceiling. Do not allow your head to tilt back. 10. Hold for 15 to 30 seconds, then lower your arms to your sides. 11. Repeat 2 to 4 times. Side stretch    5. Stand comfortably with your feet shoulder-width apart. 6. Raise one arm over your head, and then lean to the other side. 7. Slide your hand down your leg as you let the weight of your arm gently stretch your side muscles. Hold for 15 to 30 seconds. 8. Repeat 2 to 4 times on each side. Press-up    5. Lie on your stomach, supporting your body with your forearms. 6. Press your elbows down into the floor to raise your upper back. As you do this, relax your stomach muscles and allow your back to arch without using your back muscles. As your press up, do not let your hips or pelvis come off the floor. 7. Hold for 15 to 30 seconds, then relax. 8. Repeat 2 to 4 times. Relax and rest    7. Lie on your back with a rolled towel under your neck and a pillow under your knees. Extend your arms comfortably to your sides. 8. Relax and breathe normally. 9. Remain in this position for about 10 minutes. 10. If you can, do this 2 or 3 times each day. Follow-up care is a key part of your treatment and safety. Be sure to make and go to all appointments, and call your doctor if you are having problems. It's also a good idea to know your test results and keep a list of the medicines you take. Where can you learn more? Go to https://Cabanape[employer] One Financial.STEGOSYSTEMS. org and sign in to your [employer] account. Enter P689 in the [employer] box to learn more about \"Back Stretches: Exercises. \"     If you do not have an account, please click on the \"Sign Up Now\" link.   Current as of: July 1, 2021               Content Version: 13.0  © 3876-3312 Healthwise, Incorporated. Care instructions adapted under license by [facility] ([facility]). If you have questions about a medical condition or this instruction, always ask your healthcare professional. [address] any warranty or liability for your use of this information.

## 2021-11-29 NOTE — PROGRESS NOTES
Britney Mcdaniel  ([id number])    11/29/2021    Subjective:     Britney Mcdaniel is 67 y.o. female who complains today of:    Chief Complaint   Patient presents with    Back Pain         Allergies:  Floxin [ofloxacin], Praluent [alirocumab], Questran [cholestyramine], Lidocaine oint & men-meth sal, Lovaza [omega-3-acid ethyl esters], Niacin and related, Statins, Tobramycin, and Tricor [fenofibrate]    Past Medical History:   Diagnosis Date    Hyperlipidemia     Hypertension     Stroke (cerebrum) ([facility] [address] 08/01/2015    TIA (transient ischemic attack)      Past Surgical History:   Procedure Laterality Date    APPENDECTOMY      CATARACT REMOVAL      HAND SURGERY      RIGHT    HERNIA REPAIR Left 5/23/2019    primary repairs  and [facility]    TONSILLECTOMY AND ADENOIDECTOMY      UMBILICAL HERNIA REPAIR N/A 5/23/2019    Primary repair      Family History   Problem Relation Age of Onset    Cancer Mother         BREAST, COLON    High Blood Pressure Father      Social History     Socioeconomic History    Marital status:      Spouse name: Not on file    Number of children: Not on file    Years of education: Not on file    Highest education level: Not on file   Occupational History    Not on file   Tobacco Use    Smoking status: Never Smoker    Smokeless tobacco: Never Used   Substance and Sexual Activity    Alcohol use: No    Drug use: No    Sexual activity: Not on file   Other Topics Concern    Not on file   Social History Narrative    ** Merged History Encounter **          Social Determinants of Health     Financial Resource Strain: Low Risk     Difficulty of Paying Living Expenses: Not hard at all   Food Insecurity: No Food Insecurity    Worried About Running Out of Food in the Last Year: Never true    Hali of Food in the Last Year: Never true   Transportation Needs: No Transportation Needs    Lack of Transportation (Medical): No    Lack of Transportation (Non-Medical):  No   Physical Activity: Inactive    Days of Exercise per Week: 0 days    Minutes of Exercise per Session: 0 min   Stress: Stress Concern Present    Feeling of Stress : Very much   Social Connections:     Frequency of Communication with Friends and Family: Not on file    Frequency of Social Gatherings with Friends and Family: Not on file    Attends Denominational Services: Not on file    Active Member of [address] [place] [address] or Organizations: Not on file    Attends Club or Organization Meetings: Not on file    Marital Status: Not on file   Intimate Partner Violence:     Fear of Current or Ex-Partner: Not on file    Emotionally Abused: Not on file    Physically Abused: Not on file    Sexually Abused: Not on file   Housing Stability:     Unable to Pay for Housing in the Last Year: Not on file    Number of [place] in the Last Year: Not on file    Unstable Housing in the Last Year: Not on file       Current Outpatient Medications on File Prior to Visit   Medication Sig Dispense Refill    carvedilol (COREG) 25 MG tablet TAKE 1 TABLET TWICE DAILY  WITH MEALS 180 tablet 0    HYDROcodone-acetaminophen (NORCO) 5-325 MG per tablet Take 1 tablet by mouth 2 times daily as needed for Pain for up to 15 days. 30 tablet 0    doxazosin (CARDURA) 4 MG tablet TAKE 1 TABLET NIGHTLY 90 tablet 1    DULoxetine (CYMBALTA) 60 MG extended release capsule TAKE 1 CAPSULE DAILY 90 capsule 1    fluticasone-salmeterol (ADVAIR DISKUS) 500-50 MCG/DOSE diskus inhaler USE 1 INHALATION ORALLY    EVERY 12 HOURS 3 Inhaler 1    lidocaine (LIDODERM) 5 % Place 1 patch onto the skin daily 12 hours on, 12 hours off.  (Patient not taking: Reported on 10/4/2021) 30 patch 0    Handicap Placard MISC by Does not apply route Start 3-17-21 to 3-17-22 1 each 0    albuterol sulfate  (90 Base) MCG/ACT inhaler Inhale 2 puffs into the lungs every 6 hours as needed for Wheezing 1 Inhaler 0    EPINEPHrine (EPIPEN 2-DE) 0.3 MG/0.3ML SOAJ injection Inject 0.3 mLs into the muscle once for 1 dose Use as directed for allergic reaction. Please give generic 0.3 mL 0    Multiple Vitamins-Minerals (MULTIVITAMIN PO) Take by mouth daily        Current Facility-Administered Medications on File Prior to Visit   Medication Dose Route Frequency Provider Last Rate Last Admin    betamethasone acetate-betamethasone sodium phosphate (CELESTONE) injection 6 mg  6 mg Intra-LESional Once Pauly Rojas MD               Pt presents today for a f/u of her pain. PCP is Dr. Dewayne Coronel MD.  Patient last saw Dr. Jamal Noble on 10/26/2021 for bilateral L3-4-5 RF ablation. Says she felt this helped significantly >50%. Initially after the procedure it felt \"wonderful\". She says she continues to have pain. She continues to have low back pain. Denies radiating numbness or tingling. Says pain will radiate into her hips. She reports she did take a fall rushing to toilet and fell off toilet, but denies injury. Prior had this done in March and April of this year. She has chronic low back pain. Used to see Dr. Josue Lloyd and now is followed by Dr. Jamal Noble. She has a history of left knee replacement in 2011 and the right knee replaced in 2019. MRI of low back dated 8/24/2019 shows progressive degenerative changes with moderate to marked central canal stenosis at L3-4 as well as moderate canal stenosis at L4-5 per report. She was given small supply of Norco 5 mg to use sparingly. #30 tablets given \"uses once and a great while\". Pt feels pain level 3/10. Pt feels that walking/standing, caring for , not sleeping makes the pain worse, and laying down, RF ablation makes the pain better. Pt feels her medication helps   her function and improve her quality of life, specifically says help with ADL's. Pt denies radiating numbness and tingling. Denies recent falls, injuries or trauma. Pt denies new weakness. Pt reports PT has not been recently. Review of Systems   Constitutional: Negative.   Negative for fatigue. HENT: Negative. Negative for trouble swallowing. Eyes: Negative. Respiratory: Negative for cough and shortness of breath. Cardiovascular: Negative for chest pain. Gastrointestinal: Negative. Negative for constipation and diarrhea. Endocrine: Negative. Genitourinary: Negative. Musculoskeletal: Positive for back pain. Negative for neck pain. Skin: Negative. Neurological: Negative for dizziness, weakness, numbness and headaches. Hematological: Negative. Psychiatric/Behavioral: Negative. Objective:     Vitals:  Temp 97.4 °F (36.3 °C)   Ht 5' 5\" (1.651 m)   Wt 220 lb (99.8 kg)   LMP  (LMP Unknown)   BMI 36.61 kg/m² Pain Score:   3      Physical Exam  Vitals and nursing note reviewed. This is a pleasant female who answers questions appropriately and follows commands. Pt is alert and oriented x 3. Recent and remote memory is intact. Mood and affect, judgement and insight are normal.  No signs of distress, no dyspnea or SOB noted. HEENT: PERRL. Neck is supple, trachea midline. No lymphadenopathy noted. Decreased ROM with flexion and extension of low back. Non-tender with palpation to lumbar spine, but does have mild tenderness over SI joint. Negative Lorraine's test.  Negative SLR. Tightness in both hamstrings noted. Balance and coordination normal.  Strength is functional for ambulation. Cranial nerves II-XII are intact. Assessment:      Diagnosis Orders   1. Spondylosis of lumbosacral joint without myelopathy     2. Chronic pain syndrome     3. Knee arthropathy         Plan:     Periodic Controlled Substance Monitoring: Possible medication side effects, risk of tolerance/dependence & alternative treatments discussed., No signs of potential drug abuse or diversion identified. , Assessed functional status., Obtaining appropriate analgesic effect of treatment.  (Nhi Engle, APRN - CNP)    No orders of the defined types were placed in this encounter. No orders of the defined types were placed in this encounter. Discussed options with the patient today. Anatomic model pathology was shown and reviewed with pt. She is doing better s/p RF ablation - hopefully this is lasting. Discussed possible PT, she declined d/t caring for  recovering from TBI and rotator cuff repair. Home exercises/stretches given for low back and SI joints. She has enough norco. Hold injections at this time. All questions were answered. Discussed home exercise program.  Relevant imaging and pain generators reviewed. Pt verbalized understanding and agrees with above plan. Pt has chronic pain. ORT 0- low risk. OARRS was reviewed. This NP saw pt under direct supervision of Dr. Mariana Diallo. Opioid Risk Tool: Negative if blank [], Positive if []   [x] All negative      Family Hx of Substance Abuse ?        [] ETOH                  (Men 3.0; Women 1.0)  [] Illegal Drugs       (Men 3.0; Women 2.0)  [] Rx Drug Abuse  (Men 4.0; Women 4.0)                        Personal History of Substance Abuse ?     [] ETOH                (Men 3.0; Women 3.0)  [] Illegal Drugs     (Men 4.0; Women 4.0)  [] Rx Drug Abuse (Men 5.0; Women 5.0)   Age between 17-45?      [] Yes  (Men/Women 1.0)     Pre-adolescent Sex Abuse?     [] Yes (Women 3.0)  Psychological Disease ?      [] ADHD, OCD, Bipolar Disorder or Schizophrenia?        (Men/Women 2.0)     [] Depression?  (Men/Women 1.0)   Point Total 0   ( Low Risk  0-3 ,   Moderate 4-7 ,   High Risk 8+)         Follow up:  Return if symptoms worsen or fail to improve.     Navarro Espino, APRN - CNP

## 2021-12-01 DIAGNOSIS — Z12.31 ENCOUNTER FOR SCREENING MAMMOGRAM FOR MALIGNANT NEOPLASM OF BREAST: Primary | ICD-10-CM

## 2021-12-10 ENCOUNTER — HOSPITAL ENCOUNTER (OUTPATIENT)
Dept: WOMENS IMAGING | Age: 76
Discharge: HOME | End: 2021-12-12
Payer: MEDICARE

## 2021-12-10 VITALS — HEIGHT: 65 IN | BODY MASS INDEX: 36.61 KG/M2

## 2021-12-10 DIAGNOSIS — Z12.31 ENCOUNTER FOR SCREENING MAMMOGRAM FOR MALIGNANT NEOPLASM OF BREAST: ICD-10-CM

## 2021-12-10 PROCEDURE — 77063 BREAST TOMOSYNTHESIS BI: CPT

## 2021-12-15 ENCOUNTER — OFFICE VISIT (OUTPATIENT)
Dept: FAMILY MEDICINE CLINIC | Age: 76
End: 2021-12-15
Payer: MEDICARE

## 2021-12-15 VITALS
BODY MASS INDEX: 38.19 KG/M2 | WEIGHT: 229.2 LBS | TEMPERATURE: 95.8 F | OXYGEN SATURATION: 96 % | DIASTOLIC BLOOD PRESSURE: 80 MMHG | HEIGHT: 65 IN | SYSTOLIC BLOOD PRESSURE: 130 MMHG | HEART RATE: 80 BPM

## 2021-12-15 DIAGNOSIS — N39.41 URGE INCONTINENCE: ICD-10-CM

## 2021-12-15 DIAGNOSIS — N39.41 URGE INCONTINENCE: Primary | ICD-10-CM

## 2021-12-15 LAB
BACTERIA: NEGATIVE /HPF
CRYSTALS, UA: ABNORMAL /HPF
EPITHELIAL CELLS, UA: ABNORMAL /HPF (ref 0–5)
HYALINE CASTS: ABNORMAL /HPF (ref 0–5)
RBC UA: ABNORMAL /HPF (ref 0–5)
WBC UA: ABNORMAL /HPF (ref 0–5)

## 2021-12-15 PROCEDURE — 1123F ACP DISCUSS/DSCN MKR DOCD: CPT | Performed by: STUDENT IN AN ORGANIZED HEALTH CARE EDUCATION/TRAINING PROGRAM

## 2021-12-15 PROCEDURE — G8417 CALC BMI ABV UP PARAM F/U: HCPCS | Performed by: STUDENT IN AN ORGANIZED HEALTH CARE EDUCATION/TRAINING PROGRAM

## 2021-12-15 PROCEDURE — 99214 OFFICE O/P EST MOD 30 MIN: CPT | Performed by: STUDENT IN AN ORGANIZED HEALTH CARE EDUCATION/TRAINING PROGRAM

## 2021-12-15 PROCEDURE — 0509F URINE INCON PLAN DOCD: CPT | Performed by: STUDENT IN AN ORGANIZED HEALTH CARE EDUCATION/TRAINING PROGRAM

## 2021-12-15 PROCEDURE — 1090F PRES/ABSN URINE INCON ASSESS: CPT | Performed by: STUDENT IN AN ORGANIZED HEALTH CARE EDUCATION/TRAINING PROGRAM

## 2021-12-15 PROCEDURE — G8399 PT W/DXA RESULTS DOCUMENT: HCPCS | Performed by: STUDENT IN AN ORGANIZED HEALTH CARE EDUCATION/TRAINING PROGRAM

## 2021-12-15 PROCEDURE — G8484 FLU IMMUNIZE NO ADMIN: HCPCS | Performed by: STUDENT IN AN ORGANIZED HEALTH CARE EDUCATION/TRAINING PROGRAM

## 2021-12-15 PROCEDURE — G8427 DOCREV CUR MEDS BY ELIG CLIN: HCPCS | Performed by: STUDENT IN AN ORGANIZED HEALTH CARE EDUCATION/TRAINING PROGRAM

## 2021-12-15 PROCEDURE — 4040F PNEUMOC VAC/ADMIN/RCVD: CPT | Performed by: STUDENT IN AN ORGANIZED HEALTH CARE EDUCATION/TRAINING PROGRAM

## 2021-12-15 PROCEDURE — 1036F TOBACCO NON-USER: CPT | Performed by: STUDENT IN AN ORGANIZED HEALTH CARE EDUCATION/TRAINING PROGRAM

## 2021-12-15 ASSESSMENT — ENCOUNTER SYMPTOMS
BACK PAIN: 0
SORE THROAT: 0
RHINORRHEA: 0
WHEEZING: 0
COUGH: 0
DIARRHEA: 0
VOMITING: 0
ABDOMINAL PAIN: 0
SHORTNESS OF BREATH: 0
NAUSEA: 0

## 2021-12-15 NOTE — PATIENT INSTRUCTIONS
Patient Education     Urge Incontinence in Women: Care Instructions  Your Care Instructions     Urge incontinence occurs when the need to urinate is so strong that you cannot reach the toilet in time, even when your bladder contains only a small amount of urine. This is also called overactive bladder or unstable bladder. Some women may have no warning before they leak urine. This condition does not cause major health problems. But it can be embarrassing and can affect a woman's self-esteem and confidence. Treatment can cure or improve your symptoms. Follow-up care is a key part of your treatment and safety. Be sure to make and go to all appointments, and call your doctor if you are having problems. It's also a good idea to know your test results and keep a list of the medicines you take. How can you care for yourself at home? · Be safe with medicines. Take your medicines exactly as prescribed. Call your doctor if you think you are having a problem with your medicine. You will get more details on the specific medicines your doctor prescribes. · Limit caffeine and alcohol. They stimulate urine production. · Urinate every 2 to 4 hours during waking hours, even if you feel that you do not have to go. · Do pelvic floor (Kegel) exercises, which tighten and strengthen pelvic muscles. To do Kegel exercises:  ? Squeeze the same muscles you would use to stop your urine. Your belly and thighs should not move. ? Hold the squeeze for 3 seconds, then relax for 3 seconds. ? Start with 3 seconds. Then add 1 second each week until you are able to squeeze for 10 seconds. ? Repeat the exercise 10 to 15 times for each session. Do three or more sessions each day. · Try wearing pads that absorb the leaks. · Keep skin in the genital area dry. When should you call for help? Call your doctor now or seek immediate medical care if:    · You have new urinary symptoms.  These may include leaking urine, having pain when urinating, or feeling like you need to urinate often. Watch closely for changes in your health, and be sure to contact your doctor if:    · You do not get better as expected. Where can you learn more? Go to [email or web address]://4Homepedannielleeb.health[email or web address] org and sign in to your [employer] account. Enter X353 in the [facility] box to learn more about \"Urge Incontinence in Women: Care Instructions. \"     If you do not have an account, please click on the \"Sign Up Now\" link. Current as of: February 11, 2021               Content Version: 13.0  © 4305-2993 Healthwise, Incorporated. Care instructions adapted under license by [facility] ([facility]). If you have questions about a medical condition or this instruction, always ask your healthcare professional. [address] any warranty or liability for your use of this information.

## 2021-12-15 NOTE — PROGRESS NOTES
Subjective  Amy Bah, 67 y.o. female presents today with:  Chief Complaint   Patient presents with    Incontinence     Has been having urinary urgency & incont. Denies any frequency, pain or burning with urination. HPI     Patient with appointment for new onset urinary incontinence. She states that she has been having urgency with occasional incontinence. She states that this is started in the last 2 weeks or so. She states that she always has had some urgency but lately she has not been able to make it to the bathroom. She notes that she has been under considerable amount of stress lately. Her  was recently hospitalized with a brain bleed and she has been having to take care of him and has been doing more of the things around the house and driving to various doctors appointments. She denies any dysuria or blood in the urine. No burning sensation. No vaginal discharge. No fevers or chills. No back pain. No abdominal pain, nausea, vomiting or diarrhea. She does take doxazosin this is for her blood pressure. He does not drink caffeine or alcohol. She does not do any timed voiding. No saddle anesthesia. No fecal incontinence. She has no other concerns at this time. Review of Systems   Constitutional: Negative for chills, fatigue, fever and unexpected weight change. HENT: Negative for congestion, rhinorrhea and sore throat. Respiratory: Negative for cough, shortness of breath and wheezing. Cardiovascular: Negative for chest pain, palpitations and leg swelling. Gastrointestinal: Negative for abdominal pain, diarrhea, nausea and vomiting. Genitourinary: Positive for frequency and urgency. Negative for difficulty urinating, dysuria, enuresis, flank pain and hematuria. Musculoskeletal: Negative for arthralgias, back pain and joint swelling. Skin: Negative for rash. Neurological: Negative for light-headedness, numbness and headaches.        Past Medical History: Temp: 95.8 °F (35.4 °C)    SpO2: 96%    Weight: 229 lb 3.2 oz (104 kg)    Height: 5' 5\" (1.651 m)        Physical Exam  Vitals reviewed. Constitutional:       General: She is not in acute distress. HENT:      Head: Normocephalic and atraumatic. Eyes:      Conjunctiva/sclera: Conjunctivae normal.   Cardiovascular:      Rate and Rhythm: Normal rate and regular rhythm. Heart sounds: No murmur heard. No friction rub. No gallop. Pulmonary:      Effort: Pulmonary effort is normal.      Breath sounds: Normal breath sounds. No wheezing, rhonchi or rales. Abdominal:      General: Bowel sounds are normal. There is no distension. Palpations: Abdomen is soft. Tenderness: There is no abdominal tenderness. Musculoskeletal:         General: No swelling or deformity. Cervical back: Normal range of motion and neck supple. Right lower leg: No edema. Left lower leg: No edema. Skin:     General: Skin is warm and dry. Findings: No rash. Neurological:      General: No focal deficit present. Mental Status: She is alert. Gait: Gait is intact. Psychiatric:         Mood and Affect: Mood normal.         Behavior: Behavior normal.          No results found for this visit on 12/15/21. Assessment & Plan     1. Urge incontinence  Patient with new onset urge incontinence. She does not wish to start any new medications at this time. She was unable to provide a urine sample in the office. She will bring a urine sample back after she picks up her  from his appointment. I doubt urinary tract infection at this time, however, will check. We did discuss in detail lifestyle changes she can do to help with urinary incontinence including timed voiding and bladder training. She was given written instructions for both of these. We discussed the use of avoiding alcohol and caffeine. We also discussed Kegel exercises. We discussed weight loss/exercise.   We will try these behavioral interventions and have her follow-up in the office in 4 to 6 weeks. If at that time her symptoms are not improved, would consider starting anticholinergic agent such as oxybutynin or Myrbetriq. Patient on board with the plan. We will call with the results of the urinalysis when returned. All questions answered. She is instructed that if her symptoms worsen or change she should return to care sooner. Patient voiced understanding. All questions answered. Follow-up as scheduled. - Urine Reflex to Culture; Future    Orders Placed This Encounter   Procedures    Urine Reflex to Culture     Standing Status:   Future     Standing Expiration Date:   12/15/2022     Order Specific Question:   SPECIFY(EX-CATH,MIDSTREAM,CYSTO,ETC)? Answer:   clean catch     No orders of the defined types were placed in this encounter. Medications Discontinued During This Encounter   Medication Reason    lidocaine (LIDODERM) 5 % Therapy completed     Return in about 4 weeks (around 1/12/2022). Reviewed with the patient: current clinical status,medications, activities and diet. 30 minutes spent talking with patient and reviewing chart. Close follow up to evaluate treatment results and for coordination of care. I have reviewed the patient's medical history in detail and updated the computerized patient record. Please note, this report has been partially produced using speech recognition software and may cause  and /or contain errors related to that system including grammar, punctuation and spelling as well as words and phrases that may seem inappropriate. If there are questions or concerns please feel free to contact me to clarify.     Jaime Silva, DO

## 2021-12-16 LAB
BILIRUBIN URINE: NEGATIVE
BLOOD, URINE: NEGATIVE
CLARITY: CLEAR
COLOR: ABNORMAL
GLUCOSE URINE: NEGATIVE MG/DL
KETONES, URINE: ABNORMAL MG/DL
LEUKOCYTE ESTERASE, URINE: ABNORMAL
NITRITE, URINE: NEGATIVE
PH UA: 5 (ref 5–9)
PROTEIN UA: ABNORMAL MG/DL
SPECIFIC GRAVITY UA: 1.03 (ref 1–1.03)
URINE REFLEX TO CULTURE: YES
UROBILINOGEN, URINE: 1 E.U./DL

## 2021-12-17 LAB — URINE CULTURE, ROUTINE: NORMAL

## 2022-01-18 ENCOUNTER — TELEPHONE (OUTPATIENT)
Dept: FAMILY MEDICINE CLINIC | Age: 77
End: 2022-01-18

## 2022-01-18 DIAGNOSIS — M17.0 ARTHRITIS OF BOTH KNEES: Primary | ICD-10-CM

## 2022-02-01 DIAGNOSIS — I10 ESSENTIAL HYPERTENSION: ICD-10-CM

## 2022-02-01 RX ORDER — CARVEDILOL 25 MG/1
TABLET ORAL
Qty: 180 TABLET | Refills: 0 | Status: SHIPPED | OUTPATIENT
Start: 2022-02-01 | End: 2022-03-04 | Stop reason: SDUPTHER

## 2022-02-15 ENCOUNTER — TELEPHONE (OUTPATIENT)
Dept: FAMILY MEDICINE CLINIC | Age: 77
End: 2022-02-15

## 2022-02-15 NOTE — TELEPHONE ENCOUNTER
----- Message from Igor Hernandez Dr sent at 2/15/2022  3:39 PM EST -----  Subject: Message to Provider    QUESTIONS  Information for Provider? Patient has upcoming appt on 3/4/2022. She was   under the impression she had labs due prior but none in system. Please   call pt once ordered so she can get them b4 appt.   ---------------------------------------------------------------------------  --------------  CALL BACK INFO  What is the best way for the office to contact you? OK to leave message on   voicemail  Preferred Call Back Phone Number? [phone]  ---------------------------------------------------------------------------  --------------  SCRIPT ANSWERS  Relationship to Patient?  Self

## 2022-03-04 ENCOUNTER — OFFICE VISIT (OUTPATIENT)
Dept: FAMILY MEDICINE CLINIC | Age: 77
End: 2022-03-04
Payer: MEDICARE

## 2022-03-04 VITALS
HEIGHT: 65 IN | OXYGEN SATURATION: 96 % | HEART RATE: 82 BPM | WEIGHT: 230.4 LBS | DIASTOLIC BLOOD PRESSURE: 82 MMHG | TEMPERATURE: 96.3 F | RESPIRATION RATE: 14 BRPM | SYSTOLIC BLOOD PRESSURE: 130 MMHG | BODY MASS INDEX: 38.39 KG/M2

## 2022-03-04 DIAGNOSIS — J45.40 MODERATE PERSISTENT ASTHMA WITHOUT COMPLICATION: Primary | ICD-10-CM

## 2022-03-04 DIAGNOSIS — I10 ESSENTIAL HYPERTENSION: ICD-10-CM

## 2022-03-04 DIAGNOSIS — R13.10 DYSPHAGIA, UNSPECIFIED TYPE: ICD-10-CM

## 2022-03-04 DIAGNOSIS — E78.00 PURE HYPERCHOLESTEROLEMIA: ICD-10-CM

## 2022-03-04 DIAGNOSIS — R19.7 DIARRHEA, UNSPECIFIED TYPE: ICD-10-CM

## 2022-03-04 PROCEDURE — 1123F ACP DISCUSS/DSCN MKR DOCD: CPT | Performed by: FAMILY MEDICINE

## 2022-03-04 PROCEDURE — 99215 OFFICE O/P EST HI 40 MIN: CPT | Performed by: FAMILY MEDICINE

## 2022-03-04 PROCEDURE — 1036F TOBACCO NON-USER: CPT | Performed by: FAMILY MEDICINE

## 2022-03-04 PROCEDURE — 1090F PRES/ABSN URINE INCON ASSESS: CPT | Performed by: FAMILY MEDICINE

## 2022-03-04 PROCEDURE — G8427 DOCREV CUR MEDS BY ELIG CLIN: HCPCS | Performed by: FAMILY MEDICINE

## 2022-03-04 PROCEDURE — 4040F PNEUMOC VAC/ADMIN/RCVD: CPT | Performed by: FAMILY MEDICINE

## 2022-03-04 PROCEDURE — G8484 FLU IMMUNIZE NO ADMIN: HCPCS | Performed by: FAMILY MEDICINE

## 2022-03-04 PROCEDURE — G8399 PT W/DXA RESULTS DOCUMENT: HCPCS | Performed by: FAMILY MEDICINE

## 2022-03-04 PROCEDURE — G8417 CALC BMI ABV UP PARAM F/U: HCPCS | Performed by: FAMILY MEDICINE

## 2022-03-04 RX ORDER — PREDNISONE 10 MG/1
TABLET ORAL
Qty: 30 TABLET | Refills: 0 | Status: SHIPPED | OUTPATIENT
Start: 2022-03-04 | End: 2022-09-07

## 2022-03-04 RX ORDER — CARVEDILOL 25 MG/1
TABLET ORAL
Qty: 180 TABLET | Refills: 0 | Status: SHIPPED | OUTPATIENT
Start: 2022-03-04 | End: 2022-06-10 | Stop reason: SDUPTHER

## 2022-03-04 ASSESSMENT — ENCOUNTER SYMPTOMS: ABDOMINAL PAIN: 0

## 2022-03-04 NOTE — PROGRESS NOTES
Subjective:      Patient ID: Adilene Ambrocio is a 67 y.o. female    Hypertension  This is a chronic problem. The current episode started more than 1 year ago. Pertinent negatives include no chest pain. Hyperlipidemia  Pertinent negatives include no chest pain. Other  Pertinent negatives include no abdominal pain, chest pain, chills, fever, rash or weakness. Here with 2 months of periodic wheezing. Not using advair regularly as feeling like it is causing dizziness. No fever or chills. Did have diarrhea which developed about 2 weeks ago -much better currently. No blood in stool. Has had some swallowing difficulties over the last 2 months. Feels like food getting stuck at times. Weight up. Also here in follow up for htn and lipids. Review of Systems   Constitutional: Negative for chills and fever. Cardiovascular: Negative for chest pain. Gastrointestinal: Negative for abdominal pain. Skin: Negative for rash. Neurological: Negative for weakness.      Reviewed allergy, medical, social, surgical, family and med list changes and updated   Files     Social History     Socioeconomic History    Marital status:      Spouse name: Not on file    Number of children: Not on file    Years of education: Not on file    Highest education level: Not on file   Occupational History    Not on file   Tobacco Use    Smoking status: Never Smoker    Smokeless tobacco: Never Used   Substance and Sexual Activity    Alcohol use: No    Drug use: No    Sexual activity: Not on file   Other Topics Concern    Not on file   Social History Narrative    ** Merged History Encounter **          Social Determinants of Health     Financial Resource Strain: Low Risk     Difficulty of Paying Living Expenses: Not hard at all   Food Insecurity: No Food Insecurity    Worried About Running Out of Food in the Last Year: Never true    [place]ali of Food in the Last Year: Never true   Transportation Needs: No Transportation Needs    Lack of Transportation (Medical): No    Lack of Transportation (Non-Medical):  No   Physical Activity: Inactive    Days of Exercise per Week: 0 days    Minutes of Exercise per Session: 0 min   Stress: Stress Concern Present    Feeling of Stress : Very much   Social Connections:     Frequency of Communication with Friends and Family: Not on file    Frequency of Social Gatherings with Friends and Family: Not on file    Attends Anabaptist Services: Not on file    Active Member of [address] [place] [address] or Organizations: Not on file    Attends Club or Organization Meetings: Not on file    Marital Status: Not on file   Intimate Partner Violence:     Fear of Current or Ex-Partner: Not on file    Emotionally Abused: Not on file    Physically Abused: Not on file    Sexually Abused: Not on file   Housing Stability:     Unable to Pay for Housing in the Last Year: Not on file    Number of [place] in the Last Year: Not on file    Unstable Housing in the Last Year: Not on file     Current Outpatient Medications   Medication Sig Dispense Refill    carvedilol (COREG) 25 MG tablet TAKE 1 TABLET TWICE DAILY  WITH MEALS 180 tablet 0    Handicap Placard MISC by Does not apply route Duration of 1 year  Patient is high risk for fall 1 each 0    doxazosin (CARDURA) 4 MG tablet TAKE 1 TABLET NIGHTLY 90 tablet 1    DULoxetine (CYMBALTA) 60 MG extended release capsule TAKE 1 CAPSULE DAILY 90 capsule 1    fluticasone-salmeterol (ADVAIR DISKUS) 500-50 MCG/DOSE diskus inhaler USE 1 INHALATION ORALLY    EVERY 12 HOURS 3 Inhaler 1    Handicap Placard MISC by Does not apply route Start 3-17-21 to 3-17-22 1 each 0    albuterol sulfate  (90 Base) MCG/ACT inhaler Inhale 2 puffs into the lungs every 6 hours as needed for Wheezing 1 Inhaler 0    Multiple Vitamins-Minerals (MULTIVITAMIN PO) Take by mouth daily       HYDROcodone-acetaminophen (NORCO) 5-325 MG per tablet Take 1 tablet by mouth 2 times daily as needed for Pain for up to 15 days. 30 tablet 0    EPINEPHrine (EPIPEN 2-DE) 0.3 MG/0.3ML SOAJ injection Inject 0.3 mLs into the muscle once for 1 dose Use as directed for allergic reaction. Please give generic 0.3 mL 0     Current Facility-Administered Medications   Medication Dose Route Frequency Provider Last Rate Last Admin    betamethasone acetate-betamethasone sodium phosphate (CELESTONE) injection 6 mg  6 mg Intra-LESional Once Kim Villeda MD         Family History   Problem Relation Age of Onset    Cancer Mother         BREAST, COLON    Breast Cancer Mother     High Blood Pressure Father      Past Medical History:   Diagnosis Date    Hyperlipidemia     Hypertension     Stroke (cerebrum) ([facility] [address] 08/01/2015    TIA (transient ischemic attack)      Objective:   /82   Pulse 82   Temp 96.3 °F (35.7 °C)   Resp 14   Ht 5' 5\" (1.651 m)   Wt 230 lb 6.4 oz (104.5 kg)   LMP  (LMP Unknown)   SpO2 96%   BMI 38.34 kg/m²     Physical Exam  Neck:no carotid bruits. No masses. No adenopathy. No thyroid asymmetry. Lungs:clear and equal breath sounds. No wheezes or rales. Heart:rate reg. No murmur. No gallops   Pulses:Radials 2+ equal               D.P  1+ equal  Extremities:no edema in either leg  Gen: In no acute distress  Abdomen; B.S present. Soft  Non tender. No hepatosplenomegaly. No masses  Heent;  Oral cavity normal.  No tongue or lip lesions    Assessment:       Diagnosis Orders   1. Moderate persistent asthma without complication     2. Dysphagia, unspecified type  Tramaine Terry MD, Gastroenterology, [place]   3. Diarrhea, unspecified type     4. Essential hypertension     5.  Pure hypercholesterolemia           Plan:    continue current medications   Monitor diarrhea for now   Will try to use advair bid   Orders Placed This Encounter   Medications    carvedilol (COREG) 25 MG tablet     Sig: TAKE 1 TABLET TWICE DAILY  WITH MEALS     Dispense:  180 tablet     Refill:  0  predniSONE (DELTASONE) 10 MG tablet     Simg daily x 4d, 30mg daily x 3d, 20mg x 2d, 10mg x 1d, then d/c     Dispense:  30 tablet     Refill:  0   f/u in few weeks

## 2022-03-22 RX ORDER — DULOXETIN HYDROCHLORIDE 60 MG/1
CAPSULE, DELAYED RELEASE ORAL
Qty: 90 CAPSULE | Refills: 0 | Status: SHIPPED | OUTPATIENT
Start: 2022-03-22 | End: 2022-06-20

## 2022-04-01 ENCOUNTER — TELEPHONE (OUTPATIENT)
Dept: PAIN MANAGEMENT | Age: 77
End: 2022-04-01

## 2022-05-01 DIAGNOSIS — I10 ESSENTIAL HYPERTENSION: ICD-10-CM

## 2022-05-02 ENCOUNTER — OFFICE VISIT (OUTPATIENT)
Dept: PAIN MANAGEMENT | Age: 77
End: 2022-05-02
Payer: MEDICARE

## 2022-05-02 DIAGNOSIS — E66.01 SEVERE OBESITY (BMI 35.0-39.9) WITH COMORBIDITY (HCC): ICD-10-CM

## 2022-05-02 DIAGNOSIS — M47.817 SPONDYLOSIS OF LUMBOSACRAL JOINT WITHOUT MYELOPATHY: Primary | ICD-10-CM

## 2022-05-02 PROCEDURE — 64635 DESTROY LUMB/SAC FACET JNT: CPT | Performed by: PAIN MEDICINE

## 2022-05-02 PROCEDURE — 64636 DESTROY L/S FACET JNT ADDL: CPT | Performed by: PAIN MEDICINE

## 2022-05-02 RX ORDER — LIDOCAINE HYDROCHLORIDE 10 MG/ML
10 INJECTION, SOLUTION EPIDURAL; INFILTRATION; INTRACAUDAL; PERINEURAL ONCE
Status: COMPLETED | OUTPATIENT
Start: 2022-05-02 | End: 2022-05-02

## 2022-05-02 RX ORDER — BETAMETHASONE SODIUM PHOSPHATE AND BETAMETHASONE ACETATE 3; 3 MG/ML; MG/ML
6 INJECTION, SUSPENSION INTRA-ARTICULAR; INTRALESIONAL; INTRAMUSCULAR; SOFT TISSUE ONCE
Status: COMPLETED | OUTPATIENT
Start: 2022-05-02 | End: 2022-05-02

## 2022-05-02 RX ADMIN — LIDOCAINE HYDROCHLORIDE 10 MG: 10 INJECTION, SOLUTION EPIDURAL; INFILTRATION; INTRACAUDAL; PERINEURAL at 10:39

## 2022-05-02 RX ADMIN — BETAMETHASONE SODIUM PHOSPHATE AND BETAMETHASONE ACETATE 6 MG: 3; 3 INJECTION, SUSPENSION INTRA-ARTICULAR; INTRALESIONAL; INTRAMUSCULAR; SOFT TISSUE at 10:39

## 2022-05-02 NOTE — PROGRESS NOTES
[facility]) Physicians  Neurosurgery and Pain [address] [facility]: [phone]  F: [phone]      Lumbar Radio Frequency Ablation     Provider: Minesh Prabhakar DO          Patient Name: Filiberto Begum :         Date: 2022      Filiberto Begum is here today for interventional pain management. Standard ASIPP guidelines were followed and sterile technique used. Area was cleaned with Betadine x3. Informed consent was obtained. Fluoroscopic guidance was used for this procedure. Multiple views of fluoroscopy were used during procedure to assist with needle placement. Appropriate sized RF 10mm active tip needle was used and advance to appropriate anatomic location. There was appropriate multifidus contraction noted with motor stimulation at 2 Hz between 0.5-1.5 volts. No limb or gluteal contraction was noted taking it up to 3.5 volts. Prior to lesioning at 80 degrees Celsius for 90 seconds, approximately 0.75mg/1mg of Celestone and ½ cc of 1% preservative free Lidocaine was injected. Impedance was between 200-500 ohms during the procedure. Patient tolerated the procedure well, no obvious complications occurred during the procedure. Patient was appropriately monitored and discharged home in stable condition with their usual motor strength. Post Op instructions were given to patient.           [x] Bilateral [] T11 [] L1 [] S1     [] T12 [] L2 [] S2    [] Right  [x] L3 [] S3      [x] L4 [] S4    [] Left  [x] L5                              Minesh Prabhakar DO

## 2022-05-03 RX ORDER — DOXAZOSIN MESYLATE 4 MG/1
TABLET ORAL
Qty: 90 TABLET | Refills: 0 | Status: SHIPPED | OUTPATIENT
Start: 2022-05-03 | End: 2022-07-25

## 2022-05-03 NOTE — TELEPHONE ENCOUNTER
Future Appointments    This patient does not currently have any appointments scheduled.     Past Visits    Date Provider Specialty Visit Type Primary Dx   05/02/2022 Nayeli Molina DO Pain Management Office Visit Spondylosis of lumbosacral joint without myelopathy   03/04/2022 Eyal Escobar MD Family Medicine Office Visit Moderate persistent asthma without complication

## 2022-06-10 RX ORDER — CARVEDILOL 25 MG/1
TABLET ORAL
Qty: 180 TABLET | Refills: 0 | OUTPATIENT
Start: 2022-06-10

## 2022-06-10 RX ORDER — CARVEDILOL 25 MG/1
TABLET ORAL
Qty: 180 TABLET | Refills: 0 | Status: SHIPPED | OUTPATIENT
Start: 2022-06-10 | End: 2022-09-06

## 2022-06-10 NOTE — TELEPHONE ENCOUNTER
Patient requesting medication refill. Please approve or deny this request.    Rx requested:  Requested Prescriptions     Pending Prescriptions Disp Refills    carvedilol (COREG) 25 MG tablet 180 tablet 0     Sig: TAKE 1 TABLET TWICE DAILY  WITH MEALS         Last Office Visit:   3/4/2022      Next Visit Date:  No future appointments.

## 2022-06-20 RX ORDER — DULOXETIN HYDROCHLORIDE 60 MG/1
CAPSULE, DELAYED RELEASE ORAL
Qty: 90 CAPSULE | Refills: 0 | Status: SHIPPED | OUTPATIENT
Start: 2022-06-20 | End: 2022-09-12

## 2022-07-23 DIAGNOSIS — I10 ESSENTIAL HYPERTENSION: ICD-10-CM

## 2022-07-24 NOTE — TELEPHONE ENCOUNTER
Rx requested:  Requested Prescriptions     Pending Prescriptions Disp Refills    doxazosin (CARDURA) 4 MG tablet [Pharmacy Med Name: DOXAZOSIN TAB 4MG] 90 tablet 0     Sig: TAKE 1 TABLET NIGHTLY         Last Office Visit:   Visit date not found      Next Visit Date:  No future appointments.

## 2022-07-25 RX ORDER — DOXAZOSIN MESYLATE 4 MG/1
TABLET ORAL
Qty: 90 TABLET | Refills: 0 | Status: SHIPPED | OUTPATIENT
Start: 2022-07-25 | End: 2022-10-21

## 2022-09-06 RX ORDER — CARVEDILOL 25 MG/1
TABLET ORAL
Qty: 180 TABLET | Refills: 0 | Status: SHIPPED | OUTPATIENT
Start: 2022-09-06 | End: 2022-09-07 | Stop reason: SDUPTHER

## 2022-09-07 ENCOUNTER — OFFICE VISIT (OUTPATIENT)
Dept: FAMILY MEDICINE CLINIC | Age: 77
End: 2022-09-07
Payer: MEDICARE

## 2022-09-07 VITALS
TEMPERATURE: 97.2 F | SYSTOLIC BLOOD PRESSURE: 139 MMHG | HEIGHT: 65 IN | WEIGHT: 230.4 LBS | HEART RATE: 76 BPM | OXYGEN SATURATION: 97 % | BODY MASS INDEX: 38.39 KG/M2 | DIASTOLIC BLOOD PRESSURE: 72 MMHG

## 2022-09-07 DIAGNOSIS — M79.601 PAIN OF RIGHT UPPER EXTREMITY: ICD-10-CM

## 2022-09-07 DIAGNOSIS — M25.562 ACUTE PAIN OF BOTH KNEES: ICD-10-CM

## 2022-09-07 DIAGNOSIS — M25.561 ACUTE PAIN OF BOTH KNEES: ICD-10-CM

## 2022-09-07 DIAGNOSIS — M46.1 SACROILIAC INFLAMMATION (HCC): ICD-10-CM

## 2022-09-07 DIAGNOSIS — I48.0 PAROXYSMAL A-FIB (HCC): ICD-10-CM

## 2022-09-07 DIAGNOSIS — I10 PRIMARY HYPERTENSION: ICD-10-CM

## 2022-09-07 DIAGNOSIS — F33.2 SEVERE EPISODE OF RECURRENT MAJOR DEPRESSIVE DISORDER, WITHOUT PSYCHOTIC FEATURES (HCC): ICD-10-CM

## 2022-09-07 DIAGNOSIS — M79.605 PAIN OF LEFT LOWER EXTREMITY: Primary | ICD-10-CM

## 2022-09-07 DIAGNOSIS — Z91.81 AT HIGH RISK FOR FALLS: ICD-10-CM
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
## 2022-09-07 PROCEDURE — 99214 OFFICE O/P EST MOD 30 MIN: CPT | Performed by: NURSE PRACTITIONER

## 2022-09-07 PROCEDURE — G8399 PT W/DXA RESULTS DOCUMENT: HCPCS | Performed by: NURSE PRACTITIONER

## 2022-09-07 PROCEDURE — 1123F ACP DISCUSS/DSCN MKR DOCD: CPT | Performed by: NURSE PRACTITIONER

## 2022-09-07 PROCEDURE — G8427 DOCREV CUR MEDS BY ELIG CLIN: HCPCS | Performed by: NURSE PRACTITIONER

## 2022-09-07 PROCEDURE — G8417 CALC BMI ABV UP PARAM F/U: HCPCS | Performed by: NURSE PRACTITIONER

## 2022-09-07 PROCEDURE — 1036F TOBACCO NON-USER: CPT | Performed by: NURSE PRACTITIONER

## 2022-09-07 PROCEDURE — 1090F PRES/ABSN URINE INCON ASSESS: CPT | Performed by: NURSE PRACTITIONER

## 2022-09-07 RX ORDER — HYDROCODONE BITARTRATE AND ACETAMINOPHEN 5; 325 MG/1; MG/1
1 TABLET ORAL EVERY 6 HOURS PRN
Qty: 20 TABLET | Refills: 0 | Status: SHIPPED | OUTPATIENT
Start: 2022-09-07 | End: 2022-09-12

## 2022-09-07 RX ORDER — CARVEDILOL 25 MG/1
TABLET ORAL
Qty: 180 TABLET | Refills: 0 | Status: SHIPPED | OUTPATIENT
Start: 2022-09-07

## 2022-09-07 SDOH — ECONOMIC STABILITY: FOOD INSECURITY: WITHIN THE PAST 12 MONTHS, THE FOOD YOU BOUGHT JUST DIDN'T LAST AND YOU DIDN'T HAVE MONEY TO GET MORE.: NEVER TRUE

## 2022-09-07 SDOH — ECONOMIC STABILITY: TRANSPORTATION INSECURITY
IN THE PAST 12 MONTHS, HAS THE LACK OF TRANSPORTATION KEPT YOU FROM MEDICAL APPOINTMENTS OR FROM GETTING MEDICATIONS?: NO

## 2022-09-07 SDOH — ECONOMIC STABILITY: FOOD INSECURITY: WITHIN THE PAST 12 MONTHS, YOU WORRIED THAT YOUR FOOD WOULD RUN OUT BEFORE YOU GOT MONEY TO BUY MORE.: NEVER TRUE

## 2022-09-07 SDOH — ECONOMIC STABILITY: TRANSPORTATION INSECURITY
IN THE PAST 12 MONTHS, HAS LACK OF TRANSPORTATION KEPT YOU FROM MEETINGS, WORK, OR FROM GETTING THINGS NEEDED FOR DAILY LIVING?: NO

## 2022-09-07 ASSESSMENT — PATIENT HEALTH QUESTIONNAIRE - PHQ9
10. IF YOU CHECKED OFF ANY PROBLEMS, HOW DIFFICULT HAVE THESE PROBLEMS MADE IT FOR YOU TO DO YOUR WORK, TAKE CARE OF THINGS AT HOME, OR GET ALONG WITH OTHER PEOPLE: 0
8. MOVING OR SPEAKING SO SLOWLY THAT OTHER PEOPLE COULD HAVE NOTICED. OR THE OPPOSITE, BEING SO FIGETY OR RESTLESS THAT YOU HAVE BEEN MOVING AROUND A LOT MORE THAN USUAL: 0
7. TROUBLE CONCENTRATING ON THINGS, SUCH AS READING THE NEWSPAPER OR WATCHING TELEVISION: 0
SUM OF ALL RESPONSES TO PHQ QUESTIONS 1-9: 0
9. THOUGHTS THAT YOU WOULD BE BETTER OFF DEAD, OR OF HURTING YOURSELF: 0
2. FEELING DOWN, DEPRESSED OR HOPELESS: 0
6. FEELING BAD ABOUT YOURSELF - OR THAT YOU ARE A FAILURE OR HAVE LET YOURSELF OR YOUR FAMILY DOWN: 0
SUM OF ALL RESPONSES TO PHQ QUESTIONS 1-9: 0
SUM OF ALL RESPONSES TO PHQ QUESTIONS 1-9: 0
3. TROUBLE FALLING OR STAYING ASLEEP: 0
SUM OF ALL RESPONSES TO PHQ QUESTIONS 1-9: 0
4. FEELING TIRED OR HAVING LITTLE ENERGY: 0
1. LITTLE INTEREST OR PLEASURE IN DOING THINGS: 0
SUM OF ALL RESPONSES TO PHQ9 QUESTIONS 1 & 2: 0
5. POOR APPETITE OR OVEREATING: 0

## 2022-09-07 ASSESSMENT — ENCOUNTER SYMPTOMS
BACK PAIN: 1
COUGH: 0
SHORTNESS OF BREATH: 0

## 2022-09-07 ASSESSMENT — SOCIAL DETERMINANTS OF HEALTH (SDOH): HOW HARD IS IT FOR YOU TO PAY FOR THE VERY BASICS LIKE FOOD, HOUSING, MEDICAL CARE, AND HEATING?: NOT HARD AT ALL

## 2022-09-07 NOTE — PROGRESS NOTES
Subjective:      Patient ID: Radha Friday is a 65 y.o. female who presents today for:     Chief Complaint   Patient presents with    Fall     Patient presents today c/o falling. Patient states she fell twice in the last 3 weeks. Patient c/o bilateral knee pain. Patient c/o Rt arm pain that goes from shoulder to her elbow. HPI Pt in today for evaluation after fall. She reports once she missed a step because she was not paying attention to the new steps at [employer] and once was in the garage and did not realize there were boards on the mat and she hit it and fell again. Both falls she landed on her knees. She reports that after the second time she had huge \"goose eggs\" That has since resolved, but it continues to be painful. She reports that both falls she landed on her elbows. Her right upper arm is painful very deep since then. She reports that depression has been good other than dealing with the pain which can be upsetting    She reports continued low back pain with hx of sacroiliac inflammation. She reports she has seen back specialist and pain management, but when the injections quit helping she quit going. Reports that they thought she had Afib a couple of years ago. They had her wear a recorder couple of years. Did not find any A. fib during that time. So when it was taken out she opted not to have it put back in.     Past Medical History:   Diagnosis Date    Hyperlipidemia     Hypertension     Stroke (cerebrum) ([address] 08/01/2015    TIA (transient ischemic attack)      Past Surgical History:   Procedure Laterality Date    APPENDECTOMY      BREAST BIOPSY Right     benign    CATARACT REMOVAL      HAND SURGERY      RIGHT    HERNIA REPAIR Left 5/23/2019    primary repairs  and [facility]    TONSILLECTOMY AND ADENOIDECTOMY      UMBILICAL HERNIA REPAIR N/A 5/23/2019    Primary repair      Family History   Problem Relation Age of Onset    Cancer Mother         BREAST, COLON    Breast Cancer Mother High Blood Pressure Father      Social History     Socioeconomic History    Marital status:      Spouse name: Not on file    Number of children: Not on file    Years of education: Not on file    Highest education level: Not on file   Occupational History    Not on file   Tobacco Use    Smoking status: Never    Smokeless tobacco: Never   Substance and Sexual Activity    Alcohol use: No    Drug use: No    Sexual activity: Not on file   Other Topics Concern    Not on file   Social History Narrative    ** Merged History Encounter **          Social Determinants of Health     Financial Resource Strain: Low Risk     Difficulty of Paying Living Expenses: Not hard at all   Food Insecurity: No Food Insecurity    Worried About Running Out of Food in the Last Year: Never true    [address] in the Last Year: Never true   Transportation Needs: No Transportation Needs    Lack of Transportation (Medical): No    Lack of Transportation (Non-Medical):  No   Physical Activity: Not on file   Stress: Not on file   Social Connections: Not on file   Intimate Partner Violence: Not on file   Housing Stability: Not on file     Current Outpatient Medications on File Prior to Visit   Medication Sig Dispense Refill    doxazosin (CARDURA) 4 MG tablet TAKE 1 TABLET NIGHTLY 90 tablet 0    DULoxetine (CYMBALTA) 60 MG extended release capsule TAKE 1 CAPSULE DAILY 90 capsule 0    Handicap Placard MISC by Does not apply route Duration of 1 year  Patient is high risk for fall 1 each 0    fluticasone-salmeterol (ADVAIR DISKUS) 500-50 MCG/DOSE diskus inhaler USE 1 INHALATION ORALLY    EVERY 12 HOURS 3 Inhaler 1    Multiple Vitamins-Minerals (MULTIVITAMIN PO) Take by mouth daily       Handicap Placard MISC by Does not apply route Start 3-17-21 to 3-17-22 (Patient not taking: Reported on 9/7/2022) 1 each 0    albuterol sulfate  (90 Base) MCG/ACT inhaler Inhale 2 puffs into the lungs every 6 hours as needed for Wheezing (Patient not taking: Reported on 9/7/2022) 1 Inhaler 0    EPINEPHrine (EPIPEN 2-DE) 0.3 MG/0.3ML SOAJ injection Inject 0.3 mLs into the muscle once for 1 dose Use as directed for allergic reaction. Please give generic 0.3 mL 0     Current Facility-Administered Medications on File Prior to Visit   Medication Dose Route Frequency Provider Last Rate Last Admin    betamethasone acetate-betamethasone sodium phosphate (CELESTONE) injection 6 mg  6 mg Intra-LESional Once Peg MD Alicia           Allergies:  Floxin [ofloxacin], Praluent [alirocumab], Questran [cholestyramine], Lidocaine oint & men-meth sal, Lovaza [omega-3-acid ethyl esters], Niacin and related, Statins, Tobramycin, and Tricor [fenofibrate]    Review of Systems   Constitutional:  Negative for chills, fatigue and fever. HENT:  Negative for congestion and ear discharge. Respiratory:  Negative for cough and shortness of breath. Cardiovascular:  Negative for chest pain, palpitations and leg swelling. Musculoskeletal:  Positive for arthralgias, back pain, gait problem, joint swelling and myalgias. Neurological:  Negative for dizziness and headaches. Psychiatric/Behavioral:  Positive for dysphoric mood. Negative for self-injury and suicidal ideas. Objective:   /72 (Site: Left Upper Arm, Position: Sitting, Cuff Size: Large Adult)   Pulse 76   Temp 97.2 °F (36.2 °C) (Temporal)   Ht 5' 5\" (1.651 m)   Wt 230 lb 6.4 oz (104.5 kg)   LMP  (LMP Unknown)   SpO2 97%   BMI 38.34 kg/m²     Physical Exam  Constitutional:       Appearance: She is well-developed. HENT:      Head: Normocephalic. Right Ear: External ear normal.      Left Ear: External ear normal.      Nose: Nose normal.      Mouth/Throat:      Mouth: Mucous membranes are moist.      Pharynx: Oropharynx is clear. Eyes:      Conjunctiva/sclera: Conjunctivae normal.   Cardiovascular:      Rate and Rhythm: Normal rate and regular rhythm. Heart sounds: Normal heart sounds. days. Intended supply: 5 days. Take lowest dose possible to manage pain  Dispense: 20 tablet; Refill: 0    4. Severe episode of recurrent major depressive disorder, without psychotic features ([address]  Continue cymbalta 60mg daily. 5. Sacroiliac inflammation ([address]    - Idalia Carvajal MD, Sports Medicine, [place]    6. Paroxysmal A-fib (HCC)  Stable. 7. Pain of right upper extremity    - XR HUMERUS RIGHT (MIN 2 VIEWS); Future    8. Acute pain of both knees    - XR KNEE LEFT (3 VIEWS); Future  - XR KNEE RIGHT (3 VIEWS); Future  - HYDROcodone-acetaminophen (NORCO) 5-325 MG per tablet; Take 1 tablet by mouth every 6 hours as needed for Pain for up to 5 days. Intended supply: 5 days. Take lowest dose possible to manage pain  Dispense: 20 tablet; Refill: 0    9. Primary hypertension  DASH diet. - carvedilol (COREG) 25 MG tablet; TAKE 1 TABLET TWICE DAILY  WITH MEALS  Dispense: 180 tablet; Refill: 0    Reviewed with the patient: current clinicalstatus, medications, activities and diet. Side effects, adverse effects of the medication prescribedtoday, as well as treatment plan/ rationale and result expectations have been discussedwith the patient who expresses understanding and desires to proceed. Close follow upto evaluate treatment results and for coordination of care. I have reviewedthe patient's medical history in detail and updated the computerized patient record. RUMA Basurto - MAYELA          On the basis of positive falls risk screening, assessment and plan is as follows: home safety tips provided.

## 2022-09-12 RX ORDER — DULOXETIN HYDROCHLORIDE 60 MG/1
CAPSULE, DELAYED RELEASE ORAL
Qty: 90 CAPSULE | Refills: 0 | Status: SHIPPED | OUTPATIENT
Start: 2022-09-12 | End: 2022-09-19 | Stop reason: SDUPTHER

## 2022-09-12 NOTE — TELEPHONE ENCOUNTER
Future Appointments    Encounter Information    Provider Department Appt Notes   9/15/2022 DO HEMANTH Hendrickson [facility] REHABILITATION Sports Medicine C: M46.1 (ICD-10-CM) - Sacroiliac inflammation [facility])     Past Visits    Date Provider Specialty Visit Type Primary Dx   09/07/2022 RUMA Edmonds - CNP Family Medicine Office Visit Pain of left lower extremity   05/02/2022 Rah Boudreaux DO Pain Management Office Visit Spondylosis of lumbosacral joint without myelopathy   03/04/2022 Naida Libman, MD Family Medicine Office Visit Moderate persistent asthma without complication

## 2022-09-15 ENCOUNTER — INITIAL CONSULT (OUTPATIENT)
Dept: SPORTS MEDICINE | Age: 77
End: 2022-09-15
Payer: MEDICARE

## 2022-09-15 VITALS — WEIGHT: 230 LBS | TEMPERATURE: 95.7 F | BODY MASS INDEX: 38.32 KG/M2 | HEIGHT: 65 IN

## 2022-09-15 DIAGNOSIS — M46.1 BILATERAL SACROILIITIS (HCC): ICD-10-CM

## 2022-09-15 DIAGNOSIS — M47.817 SPONDYLOSIS OF LUMBOSACRAL JOINT WITHOUT MYELOPATHY: Primary | ICD-10-CM

## 2022-09-15 PROCEDURE — G8417 CALC BMI ABV UP PARAM F/U: HCPCS | Performed by: FAMILY MEDICINE

## 2022-09-15 PROCEDURE — 1090F PRES/ABSN URINE INCON ASSESS: CPT | Performed by: FAMILY MEDICINE

## 2022-09-15 PROCEDURE — 1123F ACP DISCUSS/DSCN MKR DOCD: CPT | Performed by: FAMILY MEDICINE

## 2022-09-15 PROCEDURE — G8427 DOCREV CUR MEDS BY ELIG CLIN: HCPCS | Performed by: FAMILY MEDICINE

## 2022-09-15 PROCEDURE — 99204 OFFICE O/P NEW MOD 45 MIN: CPT | Performed by: FAMILY MEDICINE

## 2022-09-15 PROCEDURE — G8399 PT W/DXA RESULTS DOCUMENT: HCPCS | Performed by: FAMILY MEDICINE

## 2022-09-15 PROCEDURE — 1036F TOBACCO NON-USER: CPT | Performed by: FAMILY MEDICINE

## 2022-09-15 RX ORDER — LIDOCAINE 50 MG/G
1 PATCH TOPICAL DAILY
Qty: 30 PATCH | Refills: 0 | Status: SHIPPED | OUTPATIENT
Start: 2022-09-15 | End: 2022-10-15

## 2022-09-15 ASSESSMENT — ENCOUNTER SYMPTOMS
BACK PAIN: 0
NAUSEA: 0
DIARRHEA: 0
SHORTNESS OF BREATH: 0
CONSTIPATION: 0

## 2022-09-15 NOTE — PROGRESS NOTES
[facility]) Physicians  Neurosurgery and Pain [facility]  [address], [address] [facility] , [address] [facility], [place]: [phone]  F: [phone]      Juan Carlos Gagnon  (1945)    9/15/2022    Subjective:     Juan Carlos Gagnon is 65 y.o. female who complains today of:    Chief Complaint   Patient presents with    Lower Back Pain     SI Inflammation - Discuss Injections       HPI     This patient comes in complaining of low back pain she states that she has had it for quite a long period of time and she is being given blocks for a long period of time and they seem not to be helping her anymore she is the pain seems to be bilateral on the lateral aspects of her back and she noticed that when she is walking and feels like her hips are in a vice says she has been through multiple types of treatments and is wondering what other options she has  Allergies:  Floxin [ofloxacin], Praluent [alirocumab], Questran [cholestyramine], Lidocaine oint & men-meth sal, Lovaza [omega-3-acid ethyl esters], Niacin and related, Statins, Tobramycin, and Tricor [fenofibrate]    Past Medical History:   Diagnosis Date    Hyperlipidemia     Hypertension     Stroke (cerebrum) ([address] 08/01/2015    TIA (transient ischemic attack)      Past Surgical History:   Procedure Laterality Date    APPENDECTOMY      BREAST BIOPSY Right     benign    CATARACT REMOVAL      HAND SURGERY      RIGHT    HERNIA REPAIR Left 5/23/2019    primary repairs  and [facility]    TONSILLECTOMY AND ADENOIDECTOMY      UMBILICAL HERNIA REPAIR N/A 5/23/2019    Primary repair      Family History   Problem Relation Age of Onset    Cancer Mother         BREAST, COLON    Breast Cancer Mother     High Blood Pressure Father      Social History     Socioeconomic History    Marital status:      Spouse name: Not on file    Number of children: Not on file    Years of education: Not on file    Highest education level: Not on file   Occupational History    Not on file   Tobacco Use    Smoking status: Never    Smokeless tobacco: Never   Substance and Sexual Activity    Alcohol use: No    Drug use: No    Sexual activity: Not on file   Other Topics Concern    Not on file   Social History Narrative    ** Merged History Encounter **          Social Determinants of Health     Financial Resource Strain: Low Risk     Difficulty of Paying Living Expenses: Not hard at all   Food Insecurity: No Food Insecurity    Worried About Running Out of Food in the Last Year: Never true    Ran Out of Food in the Last Year: Never true   Transportation Needs: No Transportation Needs    Lack of Transportation (Medical): No    Lack of Transportation (Non-Medical): No   Physical Activity: Not on file   Stress: Not on file   Social Connections: Not on file   Intimate Partner Violence: Not on file   Housing Stability: Not on file       Current Outpatient Medications on File Prior to Visit   Medication Sig Dispense Refill    DULoxetine (CYMBALTA) 60 MG extended release capsule TAKE 1 CAPSULE DAILY 90 capsule 0    carvedilol (COREG) 25 MG tablet TAKE 1 TABLET TWICE DAILY  WITH MEALS 180 tablet 0    doxazosin (CARDURA) 4 MG tablet TAKE 1 TABLET NIGHTLY 90 tablet 0    Handicap Placard MISC by Does not apply route Duration of 1 year  Patient is high risk for fall 1 each 0    fluticasone-salmeterol (ADVAIR DISKUS) 500-50 MCG/DOSE diskus inhaler USE 1 INHALATION ORALLY    EVERY 12 HOURS 3 Inhaler 1    Multiple Vitamins-Minerals (MULTIVITAMIN PO) Take by mouth daily       HYDROcodone-acetaminophen (NORCO) 5-325 MG per tablet Take 1 tablet by mouth every 6 hours as needed for Pain for up to 5 days. Intended supply: 5 days.  Take lowest dose possible to manage pain 20 tablet 0    Handicap Placard MISC by Does not apply route Start 3-17-21 to 3-17-22 (Patient not taking: No sig reported) 1 each 0    albuterol sulfate  (90 Base) MCG/ACT inhaler Inhale 2 puffs into the lungs every 6 hours as needed for Wheezing (Patient not taking: No sig reported) 1 Inhaler 0    EPINEPHrine (EPIPEN 2-DE) 0.3 MG/0.3ML SOAJ injection Inject 0.3 mLs into the muscle once for 1 dose Use as directed for allergic reaction. Please give generic 0.3 mL 0     Current Facility-Administered Medications on File Prior to Visit   Medication Dose Route Frequency Provider Last Rate Last Admin    betamethasone acetate-betamethasone sodium phosphate (CELESTONE) injection 6 mg  6 mg Intra-LESional Once Stephanie Dewitt MD             Review of Systems   Constitutional:  Negative for fever. HENT:  Negative for hearing loss. Respiratory:  Negative for shortness of breath. Gastrointestinal:  Negative for constipation, diarrhea and nausea. Genitourinary:  Negative for difficulty urinating. Musculoskeletal:  Negative for back pain and neck pain. Skin:  Negative for rash. Neurological:  Negative for headaches. Hematological:  Does not bruise/bleed easily. Psychiatric/Behavioral:  Negative for sleep disturbance. Objective:     Vitals:  Temp (!) 95.7 °F (35.4 °C) (Infrared)   Ht 5' 5\" (1.651 m)   Wt 230 lb (104.3 kg)   LMP  (LMP Unknown)   BMI 38.27 kg/m² Pain Score:   5      Physical Exam  Constitutional:       Appearance: Normal appearance. HENT:      Head: Normocephalic. Nose: No rhinorrhea. Mouth/Throat:      Pharynx: Oropharynx is clear. Eyes:      Pupils: Pupils are equal, round, and reactive to light. Cardiovascular:      Rate and Rhythm: Normal rate. Pulses: Normal pulses. Pulmonary:      Breath sounds: No wheezing. Abdominal:      Palpations: Abdomen is soft. Musculoskeletal:         General: No deformity. Cervical back: No rigidity. Lymphadenopathy:      Cervical: No cervical adenopathy. Skin:     General: Skin is warm and dry. Findings: No rash. Neurological:      Mental Status: She is alert and oriented to person, place, and time.    Psychiatric:         Mood and Affect: Mood normal.         Behavior: Behavior normal.       Ortho Exam   Examination of the lumbar spine with the neurovascular muscular status to be intact patient had near full range of motion tenderness mostly over the SI joints with positive SI signs no tension signs were appreciated    Reviewed x-rays of the lumbar spine which were done today  I reviewed an MRI of the lumbar spine which was done in 2019    Assessment:      Diagnosis Orders   1. Spondylosis of lumbosacral joint without myelopathy  Ambulatory referral to Physical Therapy    XR LUMBAR SPINE (2-3 VIEWS)      2. Bilateral sacroiliitis (HCC)  Ambulatory referral to Physical Therapy    lidocaine (LIDODERM) 5 %          Plan:   States she was sent for evaluation treatment and started on a PT program and also prescribed a Lidoderm patch also get a set her up for SI joint injections when to return for those       Orders Placed This Encounter   Medications    lidocaine (LIDODERM) 5 %     Sig: Place 1 patch onto the skin daily 12 hours on, 12 hours off. Dispense:  30 patch     Refill:  0       Orders Placed This Encounter   Procedures    XR LUMBAR SPINE (2-3 VIEWS)     Standing Status:   Future     Number of Occurrences:   1     Standing Expiration Date:   9/15/2023    Ambulatory referral to Physical Therapy     Referral Priority:   Routine     Referral Type:   Eval and Treat     Referral Reason:   Specialty Services Required     Requested Specialty:   Physical Therapist     Number of Visits Requested:   1         Follow up:  Return for injection.     LARRY MOON DO

## 2022-09-16 ENCOUNTER — TELEPHONE (OUTPATIENT)
Dept: PAIN MANAGEMENT | Age: 77
End: 2022-09-16

## 2022-09-22 ENCOUNTER — PROCEDURE VISIT (OUTPATIENT)
Dept: SPORTS MEDICINE | Age: 77
End: 2022-09-22
Payer: MEDICARE

## 2022-09-22 VITALS — HEIGHT: 65 IN | TEMPERATURE: 96.4 F | WEIGHT: 230 LBS | BODY MASS INDEX: 38.32 KG/M2

## 2022-09-22 DIAGNOSIS — M46.1 BILATERAL SACROILIITIS (HCC): Primary | ICD-10-CM

## 2022-09-22 PROCEDURE — 99999 PR OFFICE/OUTPT VISIT,PROCEDURE ONLY: CPT | Performed by: FAMILY MEDICINE

## 2022-09-22 PROCEDURE — 20552 NJX 1/MLT TRIGGER POINT 1/2: CPT | Performed by: FAMILY MEDICINE

## 2022-09-22 RX ORDER — DEXAMETHASONE SODIUM PHOSPHATE 10 MG/ML
20 INJECTION, SOLUTION INTRAMUSCULAR; INTRAVENOUS ONCE
Status: COMPLETED | OUTPATIENT
Start: 2022-09-22 | End: 2022-09-22

## 2022-09-22 RX ORDER — LIDOCAINE HYDROCHLORIDE 10 MG/ML
8 INJECTION, SOLUTION INFILTRATION; PERINEURAL ONCE
Status: COMPLETED | OUTPATIENT
Start: 2022-09-22 | End: 2022-09-22

## 2022-09-22 RX ORDER — DULOXETIN HYDROCHLORIDE 60 MG/1
CAPSULE, DELAYED RELEASE ORAL
Qty: 90 CAPSULE | Refills: 0 | Status: SHIPPED | OUTPATIENT
Start: 2022-09-22

## 2022-09-22 RX ADMIN — DEXAMETHASONE SODIUM PHOSPHATE 20 MG: 10 INJECTION, SOLUTION INTRAMUSCULAR; INTRAVENOUS at 14:12

## 2022-09-22 RX ADMIN — LIDOCAINE HYDROCHLORIDE 8 ML: 10 INJECTION, SOLUTION INFILTRATION; PERINEURAL at 14:12

## 2022-09-22 NOTE — PROGRESS NOTES
SACROILIAC JOINT INJECTION  Dx bilateral sacroiliitis      After informed consent was provided and allergies verified, the patient was positioned appropriately. The SI joint was prepped and draped with betadine to provide sterile environment. After prepping and draping the Bilateral SI joint in the usual sterile fashion, 1 cc of dexone with 4 cc of lidocaine were injected without any complications. Patient tolerated this well. Before aspiration/injection risks/benefits of a cortisone injection including infection, local skin irritation, skin atrophy, calcification, continued pain/discomfort, elevated blood sugar, burning, failure to relieve pain, possible late infection were discussed with patient. Post-procedure discomfort can be alleviated with additional medications/ice/elevation/rest over the first 24 hours as recommended. Patient verbalized understanding and wanted to proceed with planned procedure.     If fluid was aspirated it was sent for further studies  in sterile specimen container as ordered to the lab

## 2022-09-30 ENCOUNTER — HOSPITAL ENCOUNTER (OUTPATIENT)
Dept: PHYSICAL THERAPY | Age: 77
Setting detail: THERAPIES SERIES
Discharge: HOME | End: 2022-09-30
Payer: MEDICARE

## 2022-09-30 PROCEDURE — 97162 PT EVAL MOD COMPLEX 30 MIN: CPT

## 2022-09-30 PROCEDURE — 97110 THERAPEUTIC EXERCISES: CPT

## 2022-09-30 ASSESSMENT — PAIN DESCRIPTION - ONSET: ONSET: PROGRESSIVE

## 2022-09-30 ASSESSMENT — PAIN DESCRIPTION - FREQUENCY: FREQUENCY: CONTINUOUS

## 2022-09-30 ASSESSMENT — PAIN DESCRIPTION - ORIENTATION: ORIENTATION: LOWER

## 2022-09-30 ASSESSMENT — PAIN DESCRIPTION - DESCRIPTORS: DESCRIPTORS: DULL

## 2022-09-30 ASSESSMENT — PAIN - FUNCTIONAL ASSESSMENT: PAIN_FUNCTIONAL_ASSESSMENT: PREVENTS OR INTERFERES WITH ALL ACTIVE AND SOME PASSIVE ACTIVITIES

## 2022-09-30 ASSESSMENT — PAIN SCALES - GENERAL: PAINLEVEL_OUTOF10: 5

## 2022-09-30 ASSESSMENT — PAIN DESCRIPTION - PAIN TYPE: TYPE: CHRONIC PAIN

## 2022-09-30 NOTE — PROGRESS NOTES
[address] [facility]  PHYSICAL THERAPY EVALUATION      Physical Therapy: Initial Evaluation    Patient: René Guy (66 y.o.     female)   Examination Date: 2022   :  1945 ;    Viviane Head MRN: [id number]  CSN: [id number]   Insurance: Payor: MEDICARE / Plan: MEDICARE PART A AND B / Product Type: *No Product type* /   Insurance ID: [id number] - (Medicare) Secondary Insurance (if applicable):  [employer]   Referring Physician: Alexa Villarreal DO       Visits to Date/Visits Approved: 1 /      No Show/Cancelled Appts: 0 / 0     Medical Diagnosis: Spondylosis of lumbosacral joint without myelopathy [M47.817]  Bilateral sacroiliitis (HCC) [M46.1]        Treatment Diagnosis: LBP, B LE pain, decreased lumbopelvic AROM, decreased B Le and core strength, decreased posture, decreased LE flexibility, impaired gait, decreased balance, and decreased activity tolerance     PERTINENT MEDICAL HISTORY   Patient Assessed for Rehabilitation Services: Yes       Medical History: Chart Reviewed: Yes   Past Medical History:   Diagnosis Date    Hyperlipidemia     Hypertension     Stroke (cerebrum) ([facility] [address] 2015    TIA (transient ischemic attack)      Surgical History:   Past Surgical History:   Procedure Laterality Date    APPENDECTOMY      BREAST BIOPSY Right     benign    CATARACT REMOVAL      HAND SURGERY      RIGHT    HERNIA REPAIR Left 2019    primary repairs  and [facility]    TONSILLECTOMY AND ADENOIDECTOMY      UMBILICAL HERNIA REPAIR N/A 2019    Primary repair        Medications:   Current Outpatient Medications:     DULoxetine (CYMBALTA) 60 MG extended release capsule, TAKE 1 CAPSULE DAILY, Disp: 90 capsule, Rfl: 0    lidocaine (LIDODERM) 5 %, Place 1 patch onto the skin daily 12 hours on, 12 hours off., Disp: 30 patch, Rfl: 0    carvedilol (COREG) 25 MG tablet, TAKE 1 TABLET TWICE DAILY  WITH MEALS, Disp: 180 tablet, Rfl: 0    HYDROcodone-acetaminophen (NORCO) 5-325 MG per tablet, Take 1 tablet by mouth every 6 hours as needed for Pain for up to 5 days. Intended supply: 5 days. Take lowest dose possible to manage pain, Disp: 20 tablet, Rfl: 0    doxazosin (CARDURA) 4 MG tablet, TAKE 1 TABLET NIGHTLY, Disp: 90 tablet, Rfl: 0    Handicap Placard MISC, by Does not apply route Duration of 1 year Patient is high risk for fall, Disp: 1 each, Rfl: 0    fluticasone-salmeterol (ADVAIR DISKUS) 500-50 MCG/DOSE diskus inhaler, USE 1 INHALATION ORALLY    EVERY 12 HOURS, Disp: 3 Inhaler, Rfl: 1    Handicap Placard MISC, by Does not apply route Start 3-17-21 to 3-17-22 (Patient not taking: No sig reported), Disp: 1 each, Rfl: 0    albuterol sulfate  (90 Base) MCG/ACT inhaler, Inhale 2 puffs into the lungs every 6 hours as needed for Wheezing (Patient not taking: No sig reported), Disp: 1 Inhaler, Rfl: 0    EPINEPHrine (EPIPEN 2-DE) 0.3 MG/0.3ML SOAJ injection, Inject 0.3 mLs into the muscle once for 1 dose Use as directed for allergic reaction. Please give generic, Disp: 0.3 mL, Rfl: 0    Multiple Vitamins-Minerals (MULTIVITAMIN PO), Take by mouth daily , Disp: , Rfl:     Current Facility-Administered Medications:     betamethasone acetate-betamethasone sodium phosphate (CELESTONE) injection 6 mg, 6 mg, Intra-LESional, Once, Gayathri Coronado MD  Allergies: Floxin [ofloxacin], Praluent [alirocumab], Questran [cholestyramine], Lidocaine oint & men-meth sal, Lovaza [omega-3-acid ethyl esters], Niacin and related, Statins, Tobramycin, and Tricor [fenofibrate]      Imaging (if applicable): XR LUMBAR SPINE (2-3 VIEWS)    Result Date: 9/16/2022  EXAMINATION: THREE XRAY VIEWS OF THE LUMBAR SPINE 9/15/2022 2:01 pm COMPARISON: None. HISTORY: ORDERING SYSTEM PROVIDED HISTORY: Spondylosis of lumbosacral joint without myelopathy FINDINGS: There is mild lumbar spinal curvature.   Vertebral alignment is normal.  Disc space narrowing and discovertebral degenerative changes are identified. Facet arthritis is also seen. No acute vertebral fracture is visualized. The sacroiliac joints are intact. Mild curvature. Disc space narrowing and degenerative changes. XR HUMERUS RIGHT (MIN 2 VIEWS)    Result Date: 9/7/2022  PROCEDURE: X-RAY LEFT TIBIA AND FIBULA INDICATION: Trauma COMPARISON: None FINDINGS: No evidence of cortical irregularity or lucency to suggest a fracture, no evidence of lytic or sclerotic bone lesion is seen. Unremarkable alignment of the left knee prosthesis. Mild degenerative bone changes are seen. The overlying soft tissues are unremarkable. No evidence of acute osseous pathology. SUBJECTIVE EXAMINATION     History obtained from[de-identified] Chart Review,      Family/Caregiver Present: Yes    Subjective History: Onset Date: 09/15/22 (chronic)  Subjective: Pt reports having a chronic hx of LBP with having multiple injections over the years that no longer seemed to be working. Pt changed doctors and saw ortho who recommended she trial PT and gave her injections in B hip regions. Pt was also provided lidocaine patches though reports they do not help. Pt reports a few weeks ago she had x2 falls last month d/t tripping over objects. Pt reports having pain into B LE's that is intermittent into posterior LE's. Pt denies ever having PT for her back.   Additional Pertinent Hx (if applicable):     Prior diagnostic testing[de-identified] X-ray  Previous treatments prior to current episode?: Injections, Medications      Learning/Language: Learning  Does the patient/guardian have any barriers to learning?: No barriers  Will there be a co-learner?: No  What is the preferred language of the patient/guardian?: English  Is an  required?: No     Pain Screening    Pain Screening  Patient Currently in Pain: Yes  Pain Assessment: 0-10  Pain Level: 5  Best Pain Level: 3  Worst Pain Level: 8  Pain Type: Chronic pain  Pain Orientation: Lower  Pain Descriptors: Dull  Pain Frequency: Continuous  Pain Onset: Progressive  Functional Pain Assessment: Prevents or interferes with all active and some passive activities  Aggravating factors: Walking, Standing  Pain Management/Relieving Factors: Rest, Sitting, Heat, Ice, Medications    Functional Status    Social History:    Social History  Lives With: Spouse  Type of Home: House  Home Layout: Two level  Home Equipment: Cane, Walker, rolling    Occupation/Interests:   Occupation: Retired  Leisure & Hobbies: being active, getting out of the house    Prior Level of Function:   50% Independent      Current Level of Function:   </=20%      ADL Assistance: Independent  Homemaking Assistance: Independent  Ambulation Assistance: Independent  Transfer Assistance: Independent  Active :  Yes  Additional Comments: Difficulty performing housheold duties such as sweeping    OBJECTIVE EXAMINATION     Review of Systems:  Follows Commands: Within Functional Limits    VBI Screening / Lumbar Screening:    Bowel/bladder disturbances: No  Saddle anesthesia: No  Unexplained weight loss: No  Severe motor weakness: No  Stumbling or giving way while walking: No  Unrelenting pain at night: No    Observations:   General Observations  Description: thoracolumbar scoliosis, flattened lumbar lordosis, prominent CTJ, rounded shoulders    Palpation:   Lumbar Spine Palpation: mod tightness B thoracolumbar paraspinals    Mobility:   Ambulation  Device: No Device  Assistance: Independent  Quality of Gait: increased lateral excursion with slight R antalgia  Distance: within dept clinical distances    Balance Screen:   Single Leg Stance  Right Leg Eyes Open: 3 seconds  Left Leg Eyes Open: 2 seconds     Left AROM  Right AROM         AROM LLE (degrees)  L Hip Flexion 0-125: 100  L Hip Extension 0-10: 8  L Hip External Rotation 0-45: 35  L Hip Internal Rotation 0-45: 36    AROM RLE (degrees)  R Hip Flexion 0-125: 90  R Hip Extension 0-10: 5  R Hip External Rotation 0-45: 34  R Hip Internal Rotation 0-45: 38      Left PROM  Right PROM         PROM LLE (degrees)  L SLR: 55    PROM RLE (degrees)  R SLR: 60      Left Strength  Right Strength         Strength LLE  L Hip Flexion: 4+/5  L Hip Extension: 4/5  L Hip ABduction: 4/5  L Hip Internal Rotation: 5/5  L Hip External Rotation: 5/5  L Knee Flexion: 5/5  L Knee Extension: 4+/5  L Ankle Dorsiflexion: 4/5    Strength RLE  R Hip Flexion: 4/5  R Hip Extension: 4/5  R Hip ABduction: 4/5  R Hip Internal Rotation: 5/5  R Hip External Rotation: 5/5  R Knee Flexion: 5/5  R Knee Extension: 4+/5  R Ankle Dorsiflexion: 4+/5     Lumbar Assessment     AROM Lumbar Spine   Lumbar spine general AROM: flex 50%, ext 50%, B SB 50%, B rotation 75%        Trunk Strength     Trunk Strength  Trunk Flexion: 1/5     Muscle Length/Flexibility:   Muscle Length LE  Right Psoas / Iliacus: WNL  Right Hamstrings: Tight  Left Psoas / Iliacus: Tight  Left Hamstrings: Tight    Special Tests:   Special Tests Lumbar Spine  SLR: R (-), L (-)  Femoral Nerve Stretch: R (-), L (-)  Crossed SLR: R (-), L (-)  Pelvic Distraction:  (NT)    Outcomes Score:  Exam: ANGELIC 32/50    Treatment:    Exercises:   Exercises  Exercise 1: LTR 5\"x10  Exercise 2: Piriformis figure 4 30\"x3  Exercise 3: seated HS stretch 30\"x3 Christiano  Exercise 4: Bike*  Exercise 5: TA matthew*  Exercise 6: SKTC/DKTC*  Exercise 7: DLS*  Exercise 8: bridges*  Exercise 9: L modified praveen stretch*  Exercise 10: SLS*  Exercise 20: HEP: piriformis figure 4, HS stretch, LTR  Treatment Reasoning  Limitations addressed:  Mobility, Flexibility  Modalities:  Modalities  Modalities:  (MHP/CP/Estim PRN*)    Manual:  Manual Therapy  Soft Tissue Mobilizaton: B thoracolumbar paraspinals*     *Indicates exercise,modality, or manual techniques to be initiated when appropriate     ASSESSMENT     Impression: Assessment: Pt presents with chornic hx of LBP with intermittent pain radiating into posterior B LE's with progressive worsening and recently having x2 falls last month. She currently presents with decreased lumbopelvic AROM, decreased B Le and core strength, decreased posture, decreased LE flexibility, impaired gait, decreased balance, and decreased activity tolerance. These impairments currently limit her functional abilities to stand and ambulate short and prolonged periods as well as perform household duties without increased pain or limitations. Body Structures, Functions, Activity Limitations Requiring Skilled Therapeutic Intervention: Decreased functional mobility , Decreased ADL status, Decreased ROM, Increased pain, Decreased posture, Decreased balance, Decreased strength    Statement of Medical Necessity: Physical Therapy is both indicated and medically necessary as outlined in the POC to increase the likelihood of meeting the functionally related goals stated below.      Patient's Activity Tolerance: Patient tolerated evaluation without incident      Patient's rehabilitation potential/prognosis is considered to be: Good    Factors which may impact rehabilitation potential include: Chronicity of problem, Medical co-morbidities, Age     Patient Education: PT Education: Goals, PT Role, Plan of Care, Evaluative findings, Home Exercise Program     GOALS   Patient Goal(s): Patient Goals : \"Move easier\"    Short Term Goals Completed by 2-3 weeks Goal Status   STG 1The pt will demonstrate improved postural awareness requiring <25% VC's with exercises New   STG 2 The pt will have decreased LB pain </= 5/10 at worst in order to increase ease with ADL's New     Long Term Goals Completed by 5 weeks Goal Status   LTG 1 The pt will have a decrease in ANGELIC score >/=10 points in order to increase functional activity tolerance New   LTG 2 The pt will be indep/compliant with HEP in order to self manage symptoms upon D/C New   LTG 3 The pt will demo improved B LE strength >/= 4+/5 and core strength >/=fair in order to safely ambulate with decreased pain/limitations New   LTG 4 The pt will demo improved lumbar AROM >/=25% ea. B hip IR/ER AROM >/=40*, and R hip flexion >/=100* in order to increase ease with ADL's New   LTG 5 The pt will demo improved B SLS >/=5 seconds to increase ease and safety with ambulation New     TREATMENT PLAN       Requires PT Follow-Up: Yes  Treatment Initiated : ther ex and HEP    Treatment may include any combination of the following: Strengthening, ROM, Balance training, Manual Therapy - Soft Tissue Mobilization, Home exercise program, Patient/Caregiver education & training, Equipment evaluation, education, & procurement, Modalities, Positioning, Safety education & training, Pain management     Frequency / Duration:  Patient to be seen 2 times per week for 5 weeks      Eval Complexity:   Decision Making: Medium Complexity  History: Personal Factors and/or Comorbidities Impacting POC: Medium  Examination of body system(s) including body structures and functions, activity limitations, and/or participation restrictions: High  Exam: ANGELIC 32/50  Clinical Presentation: Medium  Clinical Decision Making : Medium Complexity    POST-PAIN     Pain Rating (0-10 pain scale):  \"same\" /10  Location and pain description same as pre-treatment unless indicated. Action: [] NA  [] Call Physician  [x] Perform HEP  [] Meds as prescribed    Evaluation and patient rights have been reviewed and patient agrees with plan of care.   Yes  [x]  No  []   Explain:     Kaylynn Fall Risk Assessment  Risk Factor Scale  Score   History of Falls [x] Yes  [] No 25  0 25   Secondary Diagnosis [x] Yes  [] No 15  0 15   Ambulatory Aid [] Furniture  [] Crutches/cane/walker  [x] None/bedrest/wheelchair/nurse 30  15  0    IV/Heparin Lock [] Yes  [x] No 20  0    Gait/Transferring [] Impaired  [x] Weak  [] Normal/bedrest/immobile 20  10  0 10   Mental Status [] Forgets limitations  [x] Oriented to own ability 15  0       Total: 50     Based on the Assessment score: check the appropriate box.   []  No intervention needed   Low =   Score of 0-24  [x]  Use standard prevention interventions Moderate =  Score of 24-44   [x] Discuss fall prevention strategies   [x] Indicate moderate falls risk on eval  []  Use high risk prevention interventions High = Score of 45 and higher   [] Discuss fall prevention strategies   [] Provide supervision during treatment time    Minutes:  PT Individual Minutes  Time In: 1316  Time Out: 1346  Minutes: 30  Timed Code Treatment Minutes: 10 Minutes  Procedure Minutes: 20 eval      Timed Activity Minutes Units   Ther Ex 10 1     Electronically signed by Sunny Agrawal PT on 9/30/22 at 1:55 PM EDT  Addendum to clarify my conflicting documentation within this encounter  Correction: LTG 4 added specific IR/ER >/40*  Sunny Agrawal PT

## 2022-09-30 NOTE — PROGRESS NOTES
[address]  PHYSICAL THERAPY PLAN OF CARE   [address], [address] [facility]         Ph: [phone]  Fax: [phone]    [x] Certification  [] Recertification [x]  Plan of Care  [] Progress Note [] Discharge      Referring Provider: Herbert Kiran DO     From:  Marina Gross, PT, DPT  Patient: Jaime Perry (33 y.o. female) : 1945 Date: 2022  Medical Diagnosis: Spondylosis of lumbosacral joint without myelopathy [M47.817]  Bilateral sacroiliitis (HCC) [M46.1]       Treatment Diagnosis: LBP, B LE pain, decreased lumbopelvic AROM, decreased B Le and core strength, decreased posture, decreased LE flexibility, impaired gait, decreased balance, and decreased activity tolerance    Plan of Care/Certification Expiration Date: 22   Progress Report Period from:  2022  to 2022    Visits to Date: 1 No Show: 0 Cancelled Appts: 0    OBJECTIVE:   Short Term Goals - Time Frame for Short Term Goals: 2-3 weeks    Goals Current/Discharge status  Status   Short Term Goal 1: The pt will demonstrate improved postural awareness requiring <25% VC's with exercises  fair New   Short Term Goal 2: The pt will have decreased LB pain </= 5/10 at worst in order to increase ease with ADL's  Pain Screening  Patient Currently in Pain: Yes  Pain Assessment: 0-10  Pain Level: 5  Best Pain Level: 3  Worst Pain Level: 8  Pain Type: Chronic pain  Pain Orientation: Lower  Pain Descriptors: Dull  Pain Frequency: Continuous  Pain Onset: Progressive  Functional Pain Assessment: Prevents or interferes with all active and some passive activities  Aggravating factors: Walking, Standing  Pain Management/Relieving Factors: Rest, Sitting, Heat, Ice, Medications New     Long Term Goals - Time Frame for Long Term Goals : 5 weeks  Goals Current/ Discharge status Status   Long Term Goal 1: The pt will have a decrease in ANGELIC score >/=10 points in order to increase functional activity tolerance Exam: ANGELIC 32/50   New   Long Term Goal 2: The pt will be indep/compliant with HEP in order to self manage symptoms upon D/C ongoing New   Long Term Goal 3: The pt will demo improved B LE strength >/= 4+/5 and core strength >/=fair in order to safely ambulate with decreased pain/limitations Strength LLE  L Hip Flexion: 4+/5  L Hip Extension: 4/5  L Hip ABduction: 4/5  L Hip Internal Rotation: 5/5  L Hip External Rotation: 5/5  L Knee Flexion: 5/5  L Knee Extension: 4+/5  L Ankle Dorsiflexion: 4/5  Strength RLE  R Hip Flexion: 4/5  R Hip Extension: 4/5  R Hip ABduction: 4/5  R Hip Internal Rotation: 5/5  R Hip External Rotation: 5/5  R Knee Flexion: 5/5  R Knee Extension: 4+/5  R Ankle Dorsiflexion: 4+/5      Trunk Strength  Trunk Flexion: 1/5 New   Long Term Goal 4: The pt will demo improved lumbar AROM >/=25% ea. B hip IR/ER AROM >/=40*, and R hip flexion >/=100* in order to increase ease with ADL's AROM LLE (degrees)  L Hip External Rotation 0-45: 35  L Hip Internal Rotation 0-45: 36   AROM RLE (degrees)  R Hip Flexion 0-125: 90  R Hip External Rotation 0-45: 34  R Hip Internal Rotation 0-45: 38      AROM Lumbar Spine   Lumbar spine general AROM: flex 50%, ext 50%, B SB 50%, B rotation 75% New   Long Term Goal 5: The pt will demo improved B SLS >/=5 seconds to increase ease and safety with ambulation Single Leg Stance  Right Leg Eyes Open: 3 seconds  Left Leg Eyes Open: 2 seconds   New     Body Structures, Functions, Activity Limitations Requiring Skilled Therapeutic Intervention: Decreased functional mobility , Decreased ADL status, Decreased ROM, Increased pain, Decreased posture, Decreased balance, Decreased strength  Assessment: Pt presents with chornic hx of LBP with intermittent pain radiating into posterior B LE's with progressive worsening and recently having x2 falls last month.  She currently presents with decreased lumbopelvic AROM, decreased B Le and core strength, decreased posture, decreased LE flexibility, impaired gait, decreased balance, and decreased activity tolerance. These impairments currently limit her functional abilities to stand and ambulate short and prolonged periods as well as perform household duties without increased pain or limitations. Therapy Prognosis: Good    PT Education: Goals;PT Role;Plan of Care;Evaluative findings;Home Exercise Program    PLAN: [x] Evaluate and Treat  Frequency/Duration:  Plan Frequency: 2  Plan weeks: 5  Current Treatment Recommendations: Strengthening, ROM, Balance training, Manual Therapy - Soft Tissue Mobilization, Home exercise program, Patient/Caregiver education & training, Equipment evaluation, education, & procurement, Modalities, Positioning, Safety education & training, Pain management     Precautions:  high falls risk, B TKR                       Patient Status:[x] Continue/ Initiate plan of Care    [] Discharge PT. Recommend pt continue with HEP. [] Additional visits requested, Please re-certify for additional visits:    [] Hold         Signature: Electronically signed by Grzegorz Biggs PT on 9/30/22 at 1:53 PM EDT    Addendum to clarify my conflicting documentation within this encounter  Correction: LTG 4 to add Hip IR/ER ROM   Grzegorz Biggs PT    If you have any questions or concerns, please don't hesitate to call. Thank you for your referral.    I have reviewed this plan of care and certify a need for medically necessary rehabilitation services.     Physician Signature:__________________________________________________________  Date:  Please sign and return

## 2022-10-04 ENCOUNTER — HOSPITAL ENCOUNTER (OUTPATIENT)
Dept: PHYSICAL THERAPY | Age: 77
Setting detail: THERAPIES SERIES
Discharge: HOME | End: 2022-10-04
Payer: MEDICARE

## 2022-10-04 PROCEDURE — 97110 THERAPEUTIC EXERCISES: CPT

## 2022-10-04 ASSESSMENT — PAIN DESCRIPTION - PAIN TYPE: TYPE: CHRONIC PAIN

## 2022-10-04 ASSESSMENT — PAIN DESCRIPTION - ORIENTATION: ORIENTATION: LOWER

## 2022-10-04 ASSESSMENT — PAIN DESCRIPTION - LOCATION: LOCATION: BACK;LEG

## 2022-10-04 ASSESSMENT — PAIN SCALES - GENERAL: PAINLEVEL_OUTOF10: 5

## 2022-10-04 NOTE — PROGRESS NOTES
[facility]  Outpatient Physical Therapy    Treatment Note        Date: 10/4/2022  Patient: Benjamin Garza  : 1945   Confirmed: Yes  MRN: [id number]  Referring Provider: Rustam Sultana DO    Medical Diagnosis: Spondylosis of lumbosacral joint without myelopathy [M47.817]  Bilateral sacroiliitis ([address] [M46.1]       Treatment Diagnosis: LBP, B LE pain, decreased lumbopelvic AROM, decreased B Le and core strength, decreased posture, decreased LE flexibility, impaired gait, decreased balance, and decreased activity tolerance    Visit Information:  Insurance: Payor: Kymberly Guerrero / Plan: MEDICARE PART A AND B / Product Type: *No Product type* /   PT Visit Information  Onset Date: 09/15/22 (chronic)  Total # of Visits to Date: 2  Plan of Care/Certification Expiration Date: 22  No Show: 0  Progress Note Due Date: 10/30/22  Canceled Appointment: 0  Progress Note Counter: 2/10 (PN due 10/30/22)    Subjective Information:  Subjective: Pt reports 6/10 pain this AM and decreased to 5/10 pain currently. Pt reports being really sore the evening after initial evaluation. Pt reports not trialing HEP d/t being busy this weekend. Pt reports this morning and last night she had burning in her right quad and cramping in her calf, but denies those sx's currently.   HEP Compliance:  [x] Good [] Fair [] Poor [] Reports not doing due to:    Pain Screening  Patient Currently in Pain: Yes  Pain Assessment: 0-10  Pain Level: 5  Pain Type: Chronic pain  Pain Location: Back, Leg  Pain Orientation: Lower    Treatment:  Exercises:  Exercises  Exercise 1: LTR 5\"x10 - pt reporting N&Ting after 6 reps in RLE therefore deferred  Exercise 2: Piriformis figure 4 30\"x3; cross body stretch 30''x3 b/l  Exercise 3: seated HS stretch 30\"x3 Christiano  Exercise 4: Bike*  Exercise 5: TA matthew 5''x10  Exercise 6: SKTC 30''x3/DKTC 30''x3  Exercise 7: DLS*  Exercise 8: bridges*  Exercise 9: L modified praveen stretch 30''x3  Exercise 10: SLS*  Exercise 20: HEP: piriformis figure 4, HS stretch, LTR - cont current, inc compliance  Treatment Reasoning  Limitations addressed: Mobility, Flexibility      *Indicates exercise, modality, or manual techniques to be initiated when appropriate    Objective Measures:     Strength: [x] NT  [] MMT completed:    ROM: [x] NT  [] ROM measurements:    Balance: [x] NT       Assessment: Body Structures, Functions, Activity Limitations Requiring Skilled Therapeutic Intervention: Decreased functional mobility , Decreased ADL status, Decreased ROM, Increased pain, Decreased posture, Decreased balance, Decreased strength  Assessment: Initiated tx per POC for increased strength, ROM, and dec pain. Pt reporting radicular sx's w/ LTR R>L side, therefore deferred and instructed pt to discontinue doing at home. VC's to keep ex's within tolerable ranges w/ fair follow through. Timer used for appropriate hold times w/ stretches. Treatment Diagnosis: LBP, B LE pain, decreased lumbopelvic AROM, decreased B Le and core strength, decreased posture, decreased LE flexibility, impaired gait, decreased balance, and decreased activity tolerance  Therapy Prognosis: Good       Patient Education: [] NA  PT Education: Home Exercise Program    Post-Pain Assessment:       Pain Rating (0-10 pain scale):   5/10   Location and pain description same as pre-treatment unless indicated.    Action: [] NA   [x] Perform HEP  [] Meds as prescribed  [] Modalities as prescribed   [] Call Physician     GOALS   Patient Goal(s): Patient Goals : \"Move easier\"    Short Term Goals Completed by 2-3 weeks Goal Status   STG 1 The pt will demonstrate improved postural awareness requiring <25% VC's with exercises In progress   STG 2 The pt will have decreased LB pain </= 5/10 at worst in order to increase ease with ADL's In progress     Long Term Goals Completed by 5 weeks Goal Status   LTG 1 The pt will have a decrease in ANGELIC score >/=10 points in order to increase functional activity tolerance In progress   LTG 2 The pt will be indep/compliant with HEP in order to self manage symptoms upon D/C In progress   LTG 3 The pt will demo improved B LE strength >/= 4+/5 and core strength >/=fair in order to safely ambulate with decreased pain/limitations In progress   LTG 4 The pt will demo improved lumbar AROM >/=25% ea. B hip AROM >/=40*, and R hip flexion >/=100* in order to increase ease with ADL's In progress   LTG 5 The pt will demo improved B SLS >/=5 seconds to increase ease and safety with ambulation In progress          Plan:  Frequency/Duration:  Plan  Plan Frequency: 2  Plan weeks: 5  Current Treatment Recommendations: Strengthening, ROM, Balance training, Manual Therapy - Soft Tissue Mobilization, Home exercise program, Patient/Caregiver education & training, Equipment evaluation, education, & procurement, Modalities, Positioning, Safety education & training, Pain management  Pt to continue current HEP. See objective section for any therapeutic exercise changes, additions or modifications this date.     Therapy Time:      PT Individual Minutes  Time In: 8296  Time Out: 0874  Minutes: 40  Timed Code Treatment Minutes: 40 Minutes  Procedure Minutes:0    Timed Activity Minutes Units   Ther Ex 40 3     Electronically signed by Samy Hayes PTA on 10/4/22 at 1:49 PM EDT

## 2022-10-06 ENCOUNTER — HOSPITAL ENCOUNTER (OUTPATIENT)
Dept: PHYSICAL THERAPY | Age: 77
Setting detail: THERAPIES SERIES
Discharge: HOME | End: 2022-10-06
Payer: MEDICARE

## 2022-10-06 PROCEDURE — 97110 THERAPEUTIC EXERCISES: CPT

## 2022-10-06 ASSESSMENT — PAIN DESCRIPTION - PAIN TYPE: TYPE: CHRONIC PAIN

## 2022-10-06 ASSESSMENT — PAIN DESCRIPTION - ORIENTATION: ORIENTATION: LOWER;RIGHT;LEFT

## 2022-10-06 ASSESSMENT — PAIN DESCRIPTION - LOCATION: LOCATION: BACK;HIP

## 2022-10-06 ASSESSMENT — PAIN DESCRIPTION - DESCRIPTORS: DESCRIPTORS: DULL

## 2022-10-06 ASSESSMENT — PAIN SCALES - GENERAL: PAINLEVEL_OUTOF10: 4

## 2022-10-06 NOTE — PROGRESS NOTES
[address] [facility]  Outpatient Physical Therapy    Treatment Note        Date: 10/6/2022  Patient: Shaina Blackwell  : 1945   Confirmed: Yes  MRN: [id number]  Referring Provider: Virgil Willoughby DO    Medical Diagnosis: Spondylosis of lumbosacral joint without myelopathy [M47.817]  Bilateral sacroiliitis ([address] [M46.1]       Treatment Diagnosis: LBP, B LE pain, decreased lumbopelvic AROM, decreased B Le and core strength, decreased posture, decreased LE flexibility, impaired gait, decreased balance, and decreased activity tolerance    Visit Information:  Insurance: Payor: Nabeel Ling / Plan: MEDICARE PART A AND B / Product Type: *No Product type* /   PT Visit Information  Onset Date: 09/15/22 (chronic)  Total # of Visits to Date: 3  Plan of Care/Certification Expiration Date: 22  No Show: 0  Progress Note Due Date: 10/30/22  Canceled Appointment: 0  Progress Note Counter: 3/10 (PN due 10/30/22)    Subjective Information:  Subjective: Pt stated she felt good leaving therapy the last time but was sore the next day. Pt had questions about use of Ice or Heat to help with the pain/discomfort. Reviewed both as options to address pain and discomfort. HEP Compliance:  [x] Good [] Fair [] Poor [] Reports not doing due to:    Pain Screening  Patient Currently in Pain: Yes  Pain Assessment: 0-10  Pain Level: 4  Pain Type: Chronic pain  Pain Location: Back, Hip  Pain Orientation: Lower, Right, Left  Pain Descriptors: Dull    Treatment:  Exercises:  Exercises  Exercise 2: Piriformis figure 4 30\"x3; cross body stretch 30''x3 b/l  Exercise 3: seated HS stretch 30\"x3 Christiano  Exercise 5: TA matthew 5''x10  Exercise 6: SKTC 30''x3/DKTC 30''x3  Exercise 9: L modified praveen stretch 30''x3  Exercise 20: HEP: continue with current. Objective Measures:          Strength: [x] NT  [] MMT completed:       ROM: [x] NT  [] ROM measurements:           Balance: [x] NT       Assessment:    Body Structures, to increase ease and safety with ambulation In progress       Plan:  Frequency/Duration:  Plan  Plan Frequency: 2  Plan weeks: 5  Current Treatment Recommendations: Strengthening, ROM, Balance training, Manual Therapy - Soft Tissue Mobilization, Home exercise program, Patient/Caregiver education & training, Equipment evaluation, education, & procurement, Modalities, Positioning, Safety education & training, Pain management  Pt to continue current HEP. See objective section for any therapeutic exercise changes, additions or modifications this date.     Therapy Time:      PT Individual Minutes  Time In: 9601  Time Out: 1424  Minutes: 39  Timed Code Treatment Minutes: 39 Minutes  Procedure Minutes:0  Timed Activity Minutes Units   Ther Ex 39 3     Electronically signed by Arianne Greene PTA on 10/6/22 at 2:26 PM EDT

## 2022-10-11 ENCOUNTER — HOSPITAL ENCOUNTER (OUTPATIENT)
Dept: PHYSICAL THERAPY | Age: 77
Setting detail: THERAPIES SERIES
Discharge: HOME | End: 2022-10-11
Payer: MEDICARE

## 2022-10-11 PROCEDURE — 97110 THERAPEUTIC EXERCISES: CPT

## 2022-10-11 ASSESSMENT — PAIN DESCRIPTION - PAIN TYPE: TYPE: CHRONIC PAIN

## 2022-10-11 ASSESSMENT — PAIN DESCRIPTION - DESCRIPTORS: DESCRIPTORS: DULL

## 2022-10-11 ASSESSMENT — PAIN SCALES - GENERAL: PAINLEVEL_OUTOF10: 5

## 2022-10-11 ASSESSMENT — PAIN DESCRIPTION - LOCATION: LOCATION: BACK;HIP

## 2022-10-11 ASSESSMENT — PAIN DESCRIPTION - ORIENTATION: ORIENTATION: LEFT;RIGHT;LOWER

## 2022-10-11 NOTE — PROGRESS NOTES
[facility]  Outpatient Physical Therapy    Treatment Note        Date: 10/11/2022  Patient: Kary Maldonado  : 1945   Confirmed: Yes  MRN: [id number]  Referring Provider: Bryan Velazquez DO    Medical Diagnosis: Spondylosis of lumbosacral joint without myelopathy [M47.817]  Bilateral sacroiliitis ([address] [M46.1]       Treatment Diagnosis: LBP, B LE pain, decreased lumbopelvic AROM, decreased B Le and core strength, decreased posture, decreased LE flexibility, impaired gait, decreased balance, and decreased activity tolerance    Visit Information:  Insurance: Payor: Harris Contreras / Plan: MEDICARE PART A AND B / Product Type: *No Product type* /   PT Visit Information  Onset Date: 09/15/22 (chronic)  Total # of Visits to Date: 4  Plan of Care/Certification Expiration Date: 22  No Show: 0  Progress Note Due Date: 10/30/22  Canceled Appointment: 0  Progress Note Counter: 4/10 (PN due 10/30/22)    Subjective Information:  Subjective: Patient reports increased painthis date d/t performing yard work and getting the house ready for winter. HEP Compliance:  [x] Good [] Fair [] Poor [] Reports not doing due to:    Pain Screening  Patient Currently in Pain: Yes  Pain Assessment: 0-10  Pain Level: 5  Pain Type: Chronic pain  Pain Location: Back, Hip  Pain Orientation: Left, Right, Lower  Pain Descriptors: Dull    Treatment:  Exercises:  Exercises  Exercise 2: Piriformis figure 4 30\"x3; cross body stretch 30''x3 b/l  Exercise 3: seated HS stretch 30\"x3 Yenni  Exercise 4: Bike(sportart) x 5 minutes  Exercise 5: TA matthew 5''x10  Exercise 6: SKTC 30''x3/DKTC 30''x3  Exercise 9: L modified praveen stretch 30''x3  Exercise 10: SLS 3 x 10s yenni  Exercise 20: HEP: continue with current. Objective Measures:        Strength: [x] NT      ROM: [] NT    Assessment:    Body Structures, Functions, Activity Limitations Requiring Skilled Therapeutic Intervention: Decreased functional mobility , Decreased ADL status, Decreased ROM, Increased pain, Decreased posture, Decreased balance, Decreased strength  Assessment: Progressed patient program to include single leg stands and recumbant bike to increase LE strength and hip stability. Patient noted slight increase in fatigue post newly initiated exercises but noted decreased tightness after mobilization exercises in seated and supine. Treatment Diagnosis: LBP, B LE pain, decreased lumbopelvic AROM, decreased B Le and core strength, decreased posture, decreased LE flexibility, impaired gait, decreased balance, and decreased activity tolerance  Therapy Prognosis: Good       Post-Pain Assessment:       Pain Rating (0-10 pain scale): 5  /10   Location and pain description same as pre-treatment unless indicated. Action: [] NA   [x] Perform HEP  [] Meds as prescribed  [] Modalities as prescribed   [] Call Physician     GOALS   Patient Goal(s): Patient Goals : \"Move easier\"    Short Term Goals Completed by 2-3 weeks Goal Status   STG 1 The pt will demonstrate improved postural awareness requiring <25% VC's with exercises In progress   STG 2 The pt will have decreased LB pain </= 5/10 at worst in order to increase ease with ADL's In progress     Long Term Goals Completed by 5 weeks Goal Status   LTG 1 The pt will have a decrease in ANGELIC score >/=10 points in order to increase functional activity tolerance In progress   LTG 2 The pt will be indep/compliant with HEP in order to self manage symptoms upon D/C In progress   LTG 3 The pt will demo improved B LE strength >/= 4+/5 and core strength >/=fair in order to safely ambulate with decreased pain/limitations In progress   LTG 4 The pt will demo improved lumbar AROM >/=25% ea.  B hip IR/ER AROM >/=40*, and R hip flexion >/=100* in order to increase ease with ADL's In progress   LTG 5 The pt will demo improved B SLS >/=5 seconds to increase ease and safety with ambulation In progress   Plan:  Frequency/Duration:  Plan  Plan Frequency: 2  Plan weeks: 5  Current Treatment Recommendations: Strengthening, ROM, Balance training, Manual Therapy - Soft Tissue Mobilization, Home exercise program, Patient/Caregiver education & training, Equipment evaluation, education, & procurement, Modalities, Positioning, Safety education & training, Pain management  Pt to continue current HEP. See objective section for any therapeutic exercise changes, additions or modifications this date.     Therapy Time:      PT Individual Minutes  Time In: 2916  Time Out: 6454  Minutes: 43  Timed Code Treatment Minutes: 43 Minutes  Procedure Minutes: 0  Timed Activity Minutes Units   Ther Ex 43 3   Electronically signed by Marium Solo PTA on 10/11/22 at 3:11 PM EDT

## 2022-10-13 ENCOUNTER — OFFICE VISIT (OUTPATIENT)
Dept: SPORTS MEDICINE | Age: 77
End: 2022-10-13
Payer: MEDICARE

## 2022-10-13 ENCOUNTER — HOSPITAL ENCOUNTER (OUTPATIENT)
Dept: PHYSICAL THERAPY | Age: 77
Setting detail: THERAPIES SERIES
Discharge: HOME | End: 2022-10-13
Payer: MEDICARE

## 2022-10-13 VITALS
HEART RATE: 70 BPM | WEIGHT: 230 LBS | SYSTOLIC BLOOD PRESSURE: 124 MMHG | BODY MASS INDEX: 38.32 KG/M2 | TEMPERATURE: 97 F | HEIGHT: 65 IN | DIASTOLIC BLOOD PRESSURE: 76 MMHG

## 2022-10-13 DIAGNOSIS — M47.817 SPONDYLOSIS OF LUMBOSACRAL JOINT WITHOUT MYELOPATHY: ICD-10-CM

## 2022-10-13 DIAGNOSIS — M46.1 BILATERAL SACROILIITIS (HCC): Primary | ICD-10-CM

## 2022-10-13 PROCEDURE — 99213 OFFICE O/P EST LOW 20 MIN: CPT | Performed by: FAMILY MEDICINE

## 2022-10-13 PROCEDURE — G8399 PT W/DXA RESULTS DOCUMENT: HCPCS | Performed by: FAMILY MEDICINE

## 2022-10-13 PROCEDURE — G8427 DOCREV CUR MEDS BY ELIG CLIN: HCPCS | Performed by: FAMILY MEDICINE

## 2022-10-13 PROCEDURE — G8417 CALC BMI ABV UP PARAM F/U: HCPCS | Performed by: FAMILY MEDICINE

## 2022-10-13 PROCEDURE — 1090F PRES/ABSN URINE INCON ASSESS: CPT | Performed by: FAMILY MEDICINE

## 2022-10-13 PROCEDURE — 1036F TOBACCO NON-USER: CPT | Performed by: FAMILY MEDICINE

## 2022-10-13 PROCEDURE — 1123F ACP DISCUSS/DSCN MKR DOCD: CPT | Performed by: FAMILY MEDICINE

## 2022-10-13 PROCEDURE — 97110 THERAPEUTIC EXERCISES: CPT

## 2022-10-13 PROCEDURE — G8484 FLU IMMUNIZE NO ADMIN: HCPCS | Performed by: FAMILY MEDICINE

## 2022-10-13 ASSESSMENT — ENCOUNTER SYMPTOMS
BACK PAIN: 0
DIARRHEA: 0
CONSTIPATION: 0
NAUSEA: 0
SHORTNESS OF BREATH: 0

## 2022-10-13 ASSESSMENT — PAIN DESCRIPTION - ORIENTATION: ORIENTATION: RIGHT;LEFT;LOWER

## 2022-10-13 ASSESSMENT — PAIN DESCRIPTION - DESCRIPTORS: DESCRIPTORS: DULL

## 2022-10-13 ASSESSMENT — PAIN DESCRIPTION - FREQUENCY: FREQUENCY: CONTINUOUS

## 2022-10-13 ASSESSMENT — PAIN DESCRIPTION - LOCATION: LOCATION: BACK;HIP

## 2022-10-13 ASSESSMENT — PAIN SCALES - GENERAL: PAINLEVEL_OUTOF10: 4

## 2022-10-13 NOTE — PROGRESS NOTES
[facility]) Physicians  Neurosurgery and Pain [facility]  [address] [facility] [address], [facility] [address] , [address] [place], [place]  P: [phone]  F: [phone]      Mckayla Carpenter  (1945)    10/13/2022    Subjective:     Mckayla Carpenter is 65 y.o. female who complains today of:    Chief Complaint   Patient presents with    Follow-up     Review SI Injections       HPI     Patient returns stating that she is feeling at least 50% better she thinks the shots helped a lot and still has the therapy she takes a Tylenol all p.m. to sleep at nighttime other than that she does not take any other medication for the pain  Allergies:  Floxin [ofloxacin], Praluent [alirocumab], Questran [cholestyramine], Lidocaine oint & men-meth sal, Lovaza [omega-3-acid ethyl esters], Niacin and related, Statins, Tobramycin, and Tricor [fenofibrate]    Past Medical History:   Diagnosis Date    Hyperlipidemia     Hypertension     Stroke (cerebrum) ([facility] [address] 08/01/2015    TIA (transient ischemic attack)      Past Surgical History:   Procedure Laterality Date    APPENDECTOMY      BREAST BIOPSY Right     benign    CATARACT REMOVAL      HAND SURGERY      RIGHT    HERNIA REPAIR Left 5/23/2019    primary repairs [facility] and [address] N/A 5/23/2019    Primary repair [facility]     Family History   Problem Relation Age of Onset    Cancer Mother         BREAST, COLON    Breast Cancer Mother     High Blood Pressure Father      Social History     Socioeconomic History    Marital status:      Spouse name: Not on file    Number of children: Not on file    Years of education: Not on file    Highest education level: Not on file   Occupational History    Not on file   Tobacco Use    Smoking status: Never    Smokeless tobacco: Never   Substance and Sexual Activity    Alcohol use: No    Drug use: No    Sexual activity: Not on file   Other Topics Concern    Not on file   Social History Narrative    ** Merged History Encounter **          Social Determinants of Health     Financial Resource Strain: Low Risk     Difficulty of Paying Living Expenses: Not hard at all   Food Insecurity: No Food Insecurity    Worried About [address] in the Last Year: Never true    Ran Out of Food in the Last Year: Never true   Transportation Needs: No Transportation Needs    Lack of Transportation (Medical): No    Lack of Transportation (Non-Medical): No   Physical Activity: Not on file   Stress: Not on file   Social Connections: Not on file   Intimate Partner Violence: Not on file   Housing Stability: Not on file       Current Outpatient Medications on File Prior to Visit   Medication Sig Dispense Refill    DULoxetine (CYMBALTA) 60 MG extended release capsule TAKE 1 CAPSULE DAILY 90 capsule 0    lidocaine (LIDODERM) 5 % Place 1 patch onto the skin daily 12 hours on, 12 hours off. 30 patch 0    carvedilol (COREG) 25 MG tablet TAKE 1 TABLET TWICE DAILY  WITH MEALS 180 tablet 0    HYDROcodone-acetaminophen (NORCO) 5-325 MG per tablet Take 1 tablet by mouth every 6 hours as needed for Pain for up to 5 days. Intended supply: 5 days. Take lowest dose possible to manage pain 20 tablet 0    doxazosin (CARDURA) 4 MG tablet TAKE 1 TABLET NIGHTLY 90 tablet 0    Handicap Placard MISC by Does not apply route Duration of 1 year  Patient is high risk for fall 1 each 0    fluticasone-salmeterol (ADVAIR DISKUS) 500-50 MCG/DOSE diskus inhaler USE 1 INHALATION ORALLY    EVERY 12 HOURS 3 Inhaler 1    Handicap Placard MISC by Does not apply route Start 3-17-21 to 3-17-22 (Patient not taking: No sig reported) 1 each 0    albuterol sulfate  (90 Base) MCG/ACT inhaler Inhale 2 puffs into the lungs every 6 hours as needed for Wheezing (Patient not taking: No sig reported) 1 Inhaler 0    EPINEPHrine (EPIPEN 2-DE) 0.3 MG/0.3ML SOAJ injection Inject 0.3 mLs into the muscle once for 1 dose Use as directed for allergic reaction.  Please give generic 0.3 mL 0    Multiple Vitamins-Minerals (MULTIVITAMIN PO) Take by mouth daily        Current Facility-Administered Medications on File Prior to Visit   Medication Dose Route Frequency Provider Last Rate Last Admin    betamethasone acetate-betamethasone sodium phosphate (CELESTONE) injection 6 mg  6 mg Intra-LESional Once Philipp Rodrigues MD             Review of Systems   Constitutional:  Negative for fever. HENT:  Negative for hearing loss. Respiratory:  Negative for shortness of breath. Gastrointestinal:  Negative for constipation, diarrhea and nausea. Genitourinary:  Negative for difficulty urinating. Musculoskeletal:  Negative for back pain and neck pain. Skin:  Negative for rash. Neurological:  Negative for headaches. Hematological:  Does not bruise/bleed easily. Psychiatric/Behavioral:  Negative for sleep disturbance. Objective:     Vitals:  /76 (Site: Left Upper Arm, Position: Sitting, Cuff Size: Medium Adult)   Pulse 70   Temp 97 °F (36.1 °C) (Infrared)   Ht 5' 5\" (1.651 m)   Wt 230 lb (104.3 kg)   LMP  (LMP Unknown)   BMI 38.27 kg/m² Pain Score:   5      Physical Exam  Vitals reviewed. Constitutional:       Appearance: Normal appearance. Skin:     General: Skin is warm and dry. Neurological:      Mental Status: She is alert. Ortho Exam   Examination lumbar spine revealed the neurovascular muscular status to be intact patient has near full range of motion no tension signs no palpable tenderness equivocal SI signs    Assessment:      Diagnosis Orders   1. Bilateral sacroiliitis (HCC)        2. Spondylosis of lumbosacral joint without myelopathy            Plan:   The patient is improving at a good pace when have her continue and complete physical therapy and I will see her back in 3 weeks we will assess at that point where there is any need for further studies or injections       No orders of the defined types were placed in this encounter.       No orders of the defined types were placed in this encounter. Follow up:  Return in about 3 weeks (around 11/3/2022).     LARRY MOON, DO

## 2022-10-13 NOTE — PROGRESS NOTES
[address] [facility]  Outpatient Physical Therapy    Treatment Note        Date: 10/19/2022  Patient: Vilma Weathers  : 1945   Confirmed: Yes  MRN: [id number]  Referring Provider: Marita Holter, DO    Medical Diagnosis: Spondylosis of lumbosacral joint without myelopathy [M47.817]  Bilateral sacroiliitis ([address] [M46.1]       Treatment Diagnosis: LBP, B LE pain, decreased lumbopelvic AROM, decreased B Le and core strength, decreased posture, decreased LE flexibility, impaired gait, decreased balance, and decreased activity tolerance    Visit Information:  Insurance: Payor: Nydia Samayoakell / Plan: MEDICARE PART A AND B / Product Type: *No Product type* /   PT Visit Information  Onset Date: 09/15/22 (chronic)  Total # of Visits to Date: 5  Plan of Care/Certification Expiration Date: 22  No Show: 0  Progress Note Due Date: 10/30/22  Canceled Appointment: 0  Progress Note Counter: 5/10 (PN due 10/30/22)    Subjective Information:  Subjective: Patient not having relief with shots at this time, dt this is apprehensive about getting the shots going forward meets with PCP today  HEP Compliance:  [x] Good [] Fair [] Poor [] Reports not doing due to:    Pain Screening  Patient Currently in Pain: Yes  Pain Level: 4  Pain Location: Back, Hip  Pain Orientation: Right, Left, Lower  Pain Descriptors: Dull  Pain Frequency: Continuous    Treatment:  Exercises:  Exercises  Exercise 2: Piriformis figure 4 30\"x3; cross body stretch 30''x3 b/l  Exercise 3: seated HS stretch 30\"x3 Yenni w/ 4\" step  Exercise 4: Bike(sportart) x 5 minutes  Exercise 5: TA matthew 5''x10, Plus pelvic tilt x 10  Exercise 6: SKTC 30''x3/DKTC 30''x3  Exercise 7: DLS march only this date x 5 each leg. Exercise 9: L modified praveen stretch 30''x3  Exercise 10: SLS 3 x 10s yenni  Exercise 20: HEP: continue with current.        *Indicates exercise, modality, or manual techniques to be initiated when appropriate    Objective Measures: Strength: [x] NT  [] MMT completed:       ROM: [x] NT  [] ROM measurements:       Balance: [x] NT       Assessment: Body Structures, Functions, Activity Limitations Requiring Skilled Therapeutic Intervention: Decreased functional mobility , Decreased ADL status, Decreased ROM, Increased pain, Decreased posture, Decreased balance, Decreased strength  Assessment: Continued with current program with single new addition of TA marching with modifications this date as Pt reports feeling overly fatigued and unsteady following LV with good results reporting decrease in pain following tx. Pt had a positive demeanor and was willing to participate in therapy but needed education on role of therapy to reframe expectations. Pt demo'd understanding. Pt presented with dizziness upon rising from supine to sit. allowed to rest for a brief period with relief educated on effects BP changes can have on dizziness. pt verbalized understanding. Treatment Diagnosis: LBP, B LE pain, decreased lumbopelvic AROM, decreased B Le and core strength, decreased posture, decreased LE flexibility, impaired gait, decreased balance, and decreased activity tolerance  Therapy Prognosis: Good       Patient Education: [x] NA       Post-Pain Assessment:       Pain Rating (0-10 pain scale):   2/10   Location and pain description same as pre-treatment unless indicated.    Action: [] NA   [x] Perform HEP  [] Meds as prescribed  [] Modalities as prescribed   [] Call Physician     GOALS   Patient Goal(s): Patient Goals : \"Move easier\"    Short Term Goals Completed by 2-3 weeks Goal Status   STG 1 The pt will demonstrate improved postural awareness requiring <25% VC's with exercises In progress   STG 2 The pt will have decreased LB pain </= 5/10 at worst in order to increase ease with ADL's In progress     Long Term Goals Completed by 5 weeks Goal Status   LTG 1 The pt will have a decrease in ANGELIC score >/=10 points in order to increase functional activity tolerance In progress   LTG 2 The pt will be indep/compliant with HEP in order to self manage symptoms upon D/C In progress   LTG 3 The pt will demo improved B LE strength >/= 4+/5 and core strength >/=fair in order to safely ambulate with decreased pain/limitations In progress   LTG 4 The pt will demo improved lumbar AROM >/=25% ea. B hip IR/ER AROM >/=40*, and R hip flexion >/=100* in order to increase ease with ADL's In progress   LTG 5 The pt will demo improved B SLS >/=5 seconds to increase ease and safety with ambulation In progress            Plan:  Frequency/Duration:  Plan  Plan Frequency: 2  Plan weeks: 5  Current Treatment Recommendations: Strengthening, ROM, Balance training, Manual Therapy - Soft Tissue Mobilization, Home exercise program, Patient/Caregiver education & training, Equipment evaluation, education, & procurement, Modalities, Positioning, Safety education & training, Pain management  Pt to continue current HEP. See objective section for any therapeutic exercise changes, additions or modifications this date.     Therapy Time:      PT Individual Minutes  Time In: 7535  Time Out: 5511  Minutes: 48  Timed Code Treatment Minutes: 48 Minutes  Procedure Minutes: 0  Timed Activity Minutes Units   Ther Ex 48 3     Electronically signed by Cathleen Carias PTA on 10/13/22 at 11:56 AM EDT

## 2022-10-18 ENCOUNTER — HOSPITAL ENCOUNTER (OUTPATIENT)
Dept: PHYSICAL THERAPY | Age: 77
Setting detail: THERAPIES SERIES
Discharge: HOME | End: 2022-10-18
Payer: MEDICARE

## 2022-10-18 PROCEDURE — 97110 THERAPEUTIC EXERCISES: CPT

## 2022-10-18 ASSESSMENT — PAIN DESCRIPTION - LOCATION: LOCATION: BACK;HIP

## 2022-10-18 ASSESSMENT — PAIN DESCRIPTION - DESCRIPTORS: DESCRIPTORS: DULL

## 2022-10-18 ASSESSMENT — PAIN SCALES - GENERAL: PAINLEVEL_OUTOF10: 2

## 2022-10-18 ASSESSMENT — PAIN DESCRIPTION - ORIENTATION: ORIENTATION: RIGHT;LEFT;LOWER

## 2022-10-18 ASSESSMENT — PAIN DESCRIPTION - PAIN TYPE: TYPE: CHRONIC PAIN

## 2022-10-18 NOTE — PROGRESS NOTES
[address] [facility]  Outpatient Physical Therapy    Treatment Note        Date: 10/18/2022  Patient: Benjamin Garza  : 1945   Confirmed: Yes  MRN: [id number]  Referring Provider: Rustam Sultana DO    Medical Diagnosis: Spondylosis of lumbosacral joint without myelopathy [M47.817]  Bilateral sacroiliitis ([address] [M46.1]       Treatment Diagnosis: LBP, B LE pain, decreased lumbopelvic AROM, decreased B Le and core strength, decreased posture, decreased LE flexibility, impaired gait, decreased balance, and decreased activity tolerance    Visit Information:  Insurance: Payor: Kymberly Guerrero / Plan: MEDICARE PART A AND B / Product Type: *No Product type* /   PT Visit Information  Onset Date: 09/15/22 (chronic)  Total # of Visits to Date: 6  Plan of Care/Certification Expiration Date: 22  No Show: 0  Progress Note Due Date: 10/30/22  Canceled Appointment: 0  Progress Note Counter: 6/10 (PN due 10/30/22)    Subjective Information:  Subjective: Pt reports she feels things are improving w/ PT. Pt states she's becoming more active at home where before she would just sit in her recliner all day long. HEP Compliance:  [] Good [x] Fair [] Poor [] Reports not doing due to:    Pain Screening  Patient Currently in Pain: Yes  Pain Assessment: 0-10  Pain Level: 2  Pain Type: Chronic pain  Pain Location: Back, Hip  Pain Orientation: Right, Left, Lower  Pain Descriptors: Dull    Treatment:  Exercises:  Exercises  Exercise 2: Piriformis figure 4 30\"x3; cross body stretch 30''x3 b/l  Exercise 4: Bike (sport art) L3 x 5 minutes  Exercise 5: TA matthew 5''x10  Exercise 7: DLS marches only H29  Exercise 8: bridges 5''x10  Exercise 9: B modified praveen stretch 30''x3  Exercise 10: SLS 3 x 10s yenni  Exercise 11: H/L hip add w/ ball 5''x10; hip abd RTB 5''x10  Exercise 20: HEP: hip add, abd, SLS, bridges  Treatment Reasoning  Limitations addressed:  Mobility, Flexibility    *Indicates exercise, modality, or manual techniques to be initiated when appropriate    Objective Measures:     LTG 4 Current Status[de-identified] lumbar flex 75%, ext 75%, SB B 50%, rotation 75% B; L IR 46, ER 23; R IR 35, ER 45; L hip flex 108, R 102    Assessment: Body Structures, Functions, Activity Limitations Requiring Skilled Therapeutic Intervention: Decreased functional mobility , Decreased ADL status, Decreased ROM, Increased pain, Decreased posture, Decreased balance, Decreased strength  Assessment: Cont to progress therex for increased strength w/ addition of H/L hip abd/add and bridges. Pt denies any increases in pain throughout session. Pt also denies any dizziness during this session. Pt demo's an increase in lumbar and b/l hip AROM, partially meeting goal.  Treatment Diagnosis: LBP, B LE pain, decreased lumbopelvic AROM, decreased B Le and core strength, decreased posture, decreased LE flexibility, impaired gait, decreased balance, and decreased activity tolerance  Therapy Prognosis: Good       Patient Education: [] NA  PT Education: Home Exercise Program    Post-Pain Assessment:       Pain Rating (0-10 pain scale):   0/10   Location and pain description same as pre-treatment unless indicated.    Action: [] NA   [x] Perform HEP  [] Meds as prescribed  [] Modalities as prescribed   [] Call Physician     GOALS   Patient Goal(s): Patient Goals : \"Move easier\"    Short Term Goals Completed by 2-3 weeks Goal Status   STG 1 The pt will demonstrate improved postural awareness requiring <25% VC's with exercises In progress   STG 2 The pt will have decreased LB pain </= 5/10 at worst in order to increase ease with ADL's In progress     Long Term Goals Completed by 5 weeks Goal Status   LTG 1 The pt will have a decrease in ANGELIC score >/=10 points in order to increase functional activity tolerance In progress   LTG 2 The pt will be indep/compliant with HEP in order to self manage symptoms upon D/C In progress   LTG 3 The pt will demo improved B LE strength >/= 4+/5 and core strength >/=fair in order to safely ambulate with decreased pain/limitations In progress   LTG 4 The pt will demo improved lumbar AROM >/=25% ea. B hip IR/ER AROM >/=40*, and R hip flexion >/=100* in order to increase ease with ADL's In progress, Partially met   LTG 5 The pt will demo improved B SLS >/=5 seconds to increase ease and safety with ambulation In progress          Plan:  Frequency/Duration:  Plan  Plan Frequency: 2  Plan weeks: 5  Current Treatment Recommendations: Strengthening, ROM, Balance training, Manual Therapy - Soft Tissue Mobilization, Home exercise program, Patient/Caregiver education & training, Equipment evaluation, education, & procurement, Modalities, Positioning, Safety education & training, Pain management  Pt to continue current HEP. See objective section for any therapeutic exercise changes, additions or modifications this date.     Therapy Time:      PT Individual Minutes  Time In: 7333  Time Out: 5498  Minutes: 43  Timed Code Treatment Minutes: 43 Minutes  Procedure Minutes: 0    Timed Activity Minutes Units   Ther Ex 43 3     Electronically signed by Gal Del Rosario PTA on 10/18/22 at 1:53 PM EDT

## 2022-10-20 ENCOUNTER — HOSPITAL ENCOUNTER (OUTPATIENT)
Dept: PHYSICAL THERAPY | Age: 77
Setting detail: THERAPIES SERIES
Discharge: HOME | End: 2022-10-20
Payer: MEDICARE

## 2022-10-20 NOTE — PROGRESS NOTES
Therapy                            Cancellation/No-show Note      Date: 10/20/2022  Patient: Dennie Ar (59 y.o. female)  : 1945  MRN:  [id number]  Referring Physician: Art Dudley DO    Medical Diagnosis: Spondylosis of lumbosacral joint without myelopathy [M47.817]  Bilateral sacroiliitis ([address] [M46.1]      Visit Information:  Visits to Date 6   No Show/Cancelled Appts: 0       For today's appointment patient:  [x]  Cancelled  []  Rescheduled appointment  []  No-show   []  Called pt to remind of next appointment     Reason given by patient:  [x]  Patient ill  []  Conflicting appointment  []  No transportation    []  Conflict with work  []  No reason given  []  Other:      [x] Pt has future appointments scheduled, no follow up needed  [] Pt requests to be on hold.     Reason:   If > 2 weeks please discuss with therapist.  [] Therapist to call pt for follow up     Comments:       Signature: Electronically signed by Abdias Yates PTA on 10/20/22 at 2:17 PM EDT

## 2022-10-21 DIAGNOSIS — I10 ESSENTIAL HYPERTENSION: ICD-10-CM

## 2022-10-21 RX ORDER — DOXAZOSIN MESYLATE 4 MG/1
TABLET ORAL
Qty: 90 TABLET | Refills: 0 | Status: SHIPPED | OUTPATIENT
Start: 2022-10-21

## 2022-10-25 ENCOUNTER — HOSPITAL ENCOUNTER (OUTPATIENT)
Dept: PHYSICAL THERAPY | Age: 77
Setting detail: THERAPIES SERIES
Discharge: HOME | End: 2022-10-25
Payer: MEDICARE

## 2022-10-25 PROCEDURE — 97110 THERAPEUTIC EXERCISES: CPT

## 2022-10-25 ASSESSMENT — PAIN DESCRIPTION - DESCRIPTORS: DESCRIPTORS: ACHING

## 2022-10-25 ASSESSMENT — PAIN SCALES - GENERAL: PAINLEVEL_OUTOF10: 3

## 2022-10-25 ASSESSMENT — PAIN DESCRIPTION - FREQUENCY: FREQUENCY: CONTINUOUS

## 2022-10-25 ASSESSMENT — PAIN DESCRIPTION - PAIN TYPE: TYPE: CHRONIC PAIN

## 2022-10-25 ASSESSMENT — PAIN DESCRIPTION - LOCATION: LOCATION: BACK;HIP

## 2022-10-25 NOTE — PROGRESS NOTES
[address] [facility]  Outpatient Physical Therapy    Treatment Note        Date: 10/25/2022  Patient: Mckayla Carpenter  : 1945   Confirmed: Yes  MRN: [id number]  Referring Provider: Cailin Pacheco DO    Medical Diagnosis: Spondylosis of lumbosacral joint without myelopathy [M47.817]  Bilateral sacroiliitis ([address] [M46.1]       Treatment Diagnosis: LBP, B LE pain, decreased lumbopelvic AROM, decreased B Le and core strength, decreased posture, decreased LE flexibility, impaired gait, decreased balance, and decreased activity tolerance    Visit Information:  Insurance: Payor: Kimmy Roberts / Plan: MEDICARE PART A AND B / Product Type: *No Product type* /   PT Visit Information  Onset Date: 09/15/22  Total # of Visits to Date: 7  Plan of Care/Certification Expiration Date: 22  No Show: 0  Progress Note Due Date: 10/30/22  Canceled Appointment: 1  Progress Note Counter: 7/10 (PN due 10/30/22)    Subjective Information:  Subjective: Patient reports she was out d/t \"aching and hurting. \" Patient states she is better but \"not back to myself yet. \"  HEP Compliance:  [x] Good [] Fair [] Poor [] Reports not doing due to:    Pain Screening  Patient Currently in Pain: Yes  Pain Assessment: 0-10  Pain Level: 3  Pain Type: Chronic pain  Pain Location: Back, Hip  Pain Descriptors: Aching  Pain Frequency: Continuous    Treatment:  Exercises:  Exercises  Exercise 2: Piriformis figure 4 30\"x3; cross body stretch 30''x3 b/l  Exercise 3: seated HS stretch 30\"x3 Christiano w/ 4\" step  Exercise 5: TA matthew 5''x10  Exercise 6: SKTC 30''x3/DKTC 30''x3  Exercise 7: DLS marches only V95  Exercise 8: bridges 5''x10  Exercise 9: B modified praveen stretch 30''x3  Exercise 11: H/L hip add w/ ball 5''x10; hip abd YTB 5''x15  Exercise 20: HEP: hip add, abd, SLS, bridges     Assessment:    Body Structures, Functions, Activity Limitations Requiring Skilled Therapeutic Intervention: Decreased functional mobility , Decreased ADL status, Decreased ROM, Increased pain, Decreased posture, Decreased balance, Decreased strength  Assessment: Patient tolerated continuation of current exercises this date. Focus this date on mobility and improve lower back and hip strength with good tolerance and demo displayed. Patient noted mild relief from exercises this date along with no increased pain. Treatment Diagnosis: LBP, B LE pain, decreased lumbopelvic AROM, decreased B Le and core strength, decreased posture, decreased LE flexibility, impaired gait, decreased balance, and decreased activity tolerance  Therapy Prognosis: Good       Post-Pain Assessment:       Pain Rating (0-10 pain scale):  2 /10   Location and pain description same as pre-treatment unless indicated. Action: [] NA   [x] Perform HEP  [] Meds as prescribed  [] Modalities as prescribed   [] Call Physician     GOALS   Patient Goal(s): Patient Goals : \"Move easier\"    Short Term Goals Completed by 2-3 weeks Goal Status   STG 1 The pt will demonstrate improved postural awareness requiring <25% VC's with exercises In progress   STG 2 The pt will have decreased LB pain </= 5/10 at worst in order to increase ease with ADL's In progress     Long Term Goals Completed by 5 weeks Goal Status   LTG 1 The pt will have a decrease in ANGELIC score >/=10 points in order to increase functional activity tolerance In progress   LTG 2 The pt will be indep/compliant with HEP in order to self manage symptoms upon D/C In progress   LTG 3 The pt will demo improved B LE strength >/= 4+/5 and core strength >/=fair in order to safely ambulate with decreased pain/limitations In progress   LTG 4 The pt will demo improved lumbar AROM >/=25% ea.  B hip IR/ER AROM >/=40*, and R hip flexion >/=100* in order to increase ease with ADL's In progress, Partially met   LTG 5 The pt will demo improved B SLS >/=5 seconds to increase ease and safety with ambulation In progress     Plan:  Frequency/Duration:  Plan  Plan Frequency: 2  Plan weeks: 5  Current Treatment Recommendations: Strengthening, ROM, Balance training, Manual Therapy - Soft Tissue Mobilization, Home exercise program, Patient/Caregiver education & training, Equipment evaluation, education, & procurement, Modalities, Positioning, Safety education & training, Pain management  Pt to continue current HEP. See objective section for any therapeutic exercise changes, additions or modifications this date.     Therapy Time:      PT Individual Minutes  Time In: 1295  Time Out: 5512  Minutes: 41  Timed Code Treatment Minutes: 41 Minutes  Procedure Minutes: 0  Timed Activity Minutes Units   Ther Ex 41 3   Electronically signed by Letty Beard PTA on 10/25/22 at 2:42 PM EDT

## 2022-10-27 ENCOUNTER — HOSPITAL ENCOUNTER (OUTPATIENT)
Dept: PHYSICAL THERAPY | Age: 77
Setting detail: THERAPIES SERIES
Discharge: HOME | End: 2022-10-27
Payer: MEDICARE

## 2022-10-27 PROCEDURE — 97110 THERAPEUTIC EXERCISES: CPT

## 2022-10-27 ASSESSMENT — PAIN DESCRIPTION - ORIENTATION: ORIENTATION: RIGHT;LEFT;LOWER

## 2022-10-27 ASSESSMENT — PAIN DESCRIPTION - LOCATION: LOCATION: BACK;HIP

## 2022-10-27 ASSESSMENT — PAIN SCALES - GENERAL: PAINLEVEL_OUTOF10: 4

## 2022-10-27 ASSESSMENT — PAIN DESCRIPTION - PAIN TYPE: TYPE: CHRONIC PAIN

## 2022-10-27 NOTE — PROGRESS NOTES
unable to hold SLS without UE support for longer than 1-3 sec before LOB. Assessment: Body Structures, Functions, Activity Limitations Requiring Skilled Therapeutic Intervention: Decreased functional mobility , Decreased ADL status, Decreased ROM, Increased pain, Decreased posture, Decreased balance, Decreased strength  Assessment: Pt's test and measures grossly unchanged since starting therapy, pt with improve ANGELIC score of 13/50 ( 19 point improvement) pt with self reported poor complaince with HEP. Per discussion with pt will place on hold x 2 weeks then either DC or continue. Pt to improve compliance with HEP untill follow up with MD where pt plans to request additional round of injections to assist with pain contorl. Treatment Diagnosis: LBP, B LE pain, decreased lumbopelvic AROM, decreased B Le and core strength, decreased posture, decreased LE flexibility, impaired gait, decreased balance, and decreased activity tolerance  Therapy Prognosis: Good       Patient Education: [] NA   Edu pt on importance of compliance with HEP to daily in order to assist with mobility and pain control. Post-Pain Assessment:       Pain Rating (0-10 pain scale):   4/10   Location and pain description same as pre-treatment unless indicated.    Action: [] NA   [] Perform HEP  [] Meds as prescribed  [] Modalities as prescribed   [] Call Physician     GOALS   Patient Goal(s): Patient Goals : \"Move easier\"    Short Term Goals Completed by 2-3 weeks Goal Status   STG 1 The pt will demonstrate improved postural awareness requiring <25% VC's with exercises In progress, Partially met   STG 2 The pt will have decreased LB pain </= 5/10 at worst in order to increase ease with ADL's In progress, Partially met       Long Term Goals Completed by 5 weeks Goal Status   LTG 1 The pt will have a decrease in ANGELIC score >/=10 points in order to increase functional activity tolerance In progress   LTG 2 The pt will be indep/compliant with HEP in order to self manage symptoms upon D/C In progress   LTG 3 The pt will demo improved B LE strength >/= 4+/5 and core strength >/=fair in order to safely ambulate with decreased pain/limitations In progress   LTG 4 The pt will demo improved lumbar AROM >/=25% ea. B hip IR/ER AROM >/=40*, and R hip flexion >/=100* in order to increase ease with ADL's In progress, Partially met   LTG 5 The pt will demo improved B SLS >/=5 seconds to increase ease and safety with ambulation In progress, Not Met       Plan:  Frequency/Duration:  Plan  Plan Frequency: 2  Plan weeks: 5  Current Treatment Recommendations: Strengthening, ROM, Balance training, Manual Therapy - Soft Tissue Mobilization, Home exercise program, Patient/Caregiver education & training, Equipment evaluation, education, & procurement, Modalities, Positioning, Safety education & training, Pain management  Additional Comments: Hold x 2 weeks, pt to contact MD for possible injections and then contact clinic to either DC or continue by 11/11/22  Pt to continue current HEP. See objective section for any therapeutic exercise changes, additions or modifications this date.     Therapy Time:      PT Individual Minutes  Time In: 4425  Time Out: 7921  Minutes: 38  Timed Code Treatment Minutes: 38 Minutes  Procedure Minutes:0  Timed Activity Minutes Units   Manual  38 3     Electronically signed by Eve Schlatter, PTA on 10/27/22 at 3:22 PM EDT

## 2022-10-27 NOTE — PROGRESS NOTES
[address]  PHYSICAL THERAPY PLAN OF CARE   [address], [address] [facility]         Ph: [phone]  Fax: [phone]    [] Certification  [] Recertification []  Plan of Care  [x] Progress Note [] Discharge      Referring Provider: Hilario Barajas DO     From:  Renetta Delcid, PT,DPT  Patient: Shazia Jaimes (54 y.o. 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]  Bilateral sacroiliitis ([facility] [address] [M46.1]       Treatment Diagnosis: LBP, B LE pain, decreased lumbopelvic AROM, decreased B Le and core strength, decreased posture, decreased LE flexibility, impaired gait, decreased balance, and decreased activity tolerance    Plan of Care/Certification Expiration Date: 22   Progress Report Period from:  2022  to 10/27/2022    Visits to Date: 7 No Show: 0 Cancelled Appts: 1    OBJECTIVE:   Short Term Goals - Time Frame for Short Term Goals: 2-3 weeks    Goals Current/Discharge status  Status   Short Term Goal 1: The pt will demonstrate improved postural awareness requiring <25% VC's with exercises  STG 1 Current Status[de-identified] Pt needs VCs 25-50% of the time. In progress, Partially met   Short Term Goal 2: The pt will have decreased LB pain </= 5/10 at worst in order to increase ease with ADL's  STG 2 Current Status[de-identified] Pt reports pain of 4-5/10 daily with night time the worse.  2/10 at best   In progress, Partially met     Long Term Goals - Time Frame for Long Term Goals : 5 weeks  Goals Current/ Discharge status Status   Long Term Goal 1: The pt will have a decrease in ANGELIC score >/=10 points in order to increase functional activity tolerance LTG 1 Current Status[de-identified] /50 ( 19 point improvement from previous score)   In progress   Long Term Goal 2: The pt will be indep/compliant with HEP in order to self manage symptoms upon D/C Poor compliance with current HEP    In progress   Long Term Goal 3: The pt will demo improved B LE strength >/= 4+/5 and core strength >/=fair in order to safely ambulate with decreased pain/limitations Strength LLE  L Hip Flexion: 4/5  L Hip Extension: 4/5  L Hip ABduction: 4/5  L Hip Internal Rotation: 5/5  L Hip External Rotation: 5/5  L Knee Flexion: 5/5  L Knee Extension: 4+/5  L Ankle Dorsiflexion: 4/5  Strength RLE  R Hip Flexion: 4/5  R Hip Extension: 4/5  R Hip ABduction: 4/5  R Hip Internal Rotation: 5/5  R Hip External Rotation: 5/5  R Knee Flexion: 5/5  R Knee Extension: 4+/5  R Ankle Dorsiflexion: 4+/5      Trunk Strength  Trunk Flexion: 1/5 In progress   Long Term Goal 4: The pt will demo improved lumbar AROM >/=25% ea. B hip IR/ER AROM >/=40*, and R hip flexion >/=100* in order to increase ease with ADL's LTG 4 Current Status[de-identified] lumbar flex 75%, ext 75%, SB B 50%, rotation 75% B; L IR 46, ER 23; R IR 35, ER 45; L hip flex 108, R 102 * unchanged from measurments taken on 10/18/22   In progress, Partially met   Long Term Goal 5: The pt will demo improved B SLS >/=5 seconds to increase ease and safety with ambulation LTG 5 Current Status[de-identified] Pt unable to hold SLS without UE support for longer than 1-3 sec before LOB. In progress, Not Met     Body Structures, Functions, Activity Limitations Requiring Skilled Therapeutic Intervention: Decreased functional mobility , Decreased ADL status, Decreased ROM, Increased pain, Decreased posture, Decreased balance, Decreased strength  Assessment: Pt demos no significant progress towards above ROM/strength goals since starting therapy. However, pt with improved ANGELIC score of 13/50 ( 19 point improvement) and improved pain reaching 4-5/10 at worst at night. Pt with self reported poor complaince with HEP. Further recommended to place on hold x 2 weeks with pt to increase compliance with HEP.   Therapy Prognosis: Good    PLAN:   Frequency/Duration:  Plan Frequency: 2  Plan weeks: 5  Current Treatment Recommendations: Strengthening, ROM, Balance training, Manual Therapy - Soft Tissue Mobilization, Home exercise program, Patient/Caregiver education & training, Equipment evaluation, education, & procurement, Modalities, Positioning, Safety education & training, Pain management  Additional Comments: Hold x 2 weeks, pt to contact MD for possible injections and then contact clinic to either DC or continue by 11/11/22     Precautions: high falls risk, B TKR                         Patient Status:[] Continue/ Initiate plan of Care     [] Discharge PT. Recommend pt continue with HEP. [] Additional visits requested, Please re-certify for additional visits:     [x] Hold x 2 weeks        Signature: Electronically signed by Corinne Noyola PT on 10/27/2022 at 5:02 PM  Objective measures by: Electronically signed by Amor Valero PTA on 10/27/22 at 4:09 PM EDT    If you have any questions or concerns, please don't hesitate to call. Thank you for your referral.    I have reviewed this plan of care and certify a need for medically necessary rehabilitation services.     Physician Signature:__________________________________________________________  Date:  Please sign and return

## 2022-10-28 ENCOUNTER — TELEPHONE (OUTPATIENT)
Dept: SPORTS MEDICINE | Age: 77
End: 2022-10-28

## 2022-10-28 NOTE — TELEPHONE ENCOUNTER
Pt states she is on a hold of physical therapy until she sees Dr. Hemalatha Roca again due to increased pain. Pt asks if she could receive another si joint injection or what Dr. Hemalatha Roca advises?     Pt is scheduled for a follow up on 11/3/22

## 2022-11-03 ENCOUNTER — PROCEDURE VISIT (OUTPATIENT)
Dept: SPORTS MEDICINE | Age: 77
End: 2022-11-03
Payer: MEDICARE

## 2022-11-03 DIAGNOSIS — M46.1 BILATERAL SACROILIITIS (HCC): Primary | ICD-10-CM

## 2022-11-03 PROCEDURE — 20552 NJX 1/MLT TRIGGER POINT 1/2: CPT | Performed by: FAMILY MEDICINE

## 2022-11-03 PROCEDURE — 76942 ECHO GUIDE FOR BIOPSY: CPT | Performed by: FAMILY MEDICINE

## 2022-11-03 PROCEDURE — 99999 PR OFFICE/OUTPT VISIT,PROCEDURE ONLY: CPT | Performed by: FAMILY MEDICINE

## 2022-11-03 RX ORDER — LIDOCAINE HYDROCHLORIDE 10 MG/ML
4 INJECTION, SOLUTION INFILTRATION; PERINEURAL ONCE
Status: COMPLETED | OUTPATIENT
Start: 2022-11-03 | End: 2022-11-03

## 2022-11-03 RX ORDER — BETAMETHASONE SODIUM PHOSPHATE AND BETAMETHASONE ACETATE 3; 3 MG/ML; MG/ML
12 INJECTION, SUSPENSION INTRA-ARTICULAR; INTRALESIONAL; INTRAMUSCULAR; SOFT TISSUE ONCE
Status: COMPLETED | OUTPATIENT
Start: 2022-11-03 | End: 2022-11-03

## 2022-11-03 RX ADMIN — LIDOCAINE HYDROCHLORIDE 4 ML: 10 INJECTION, SOLUTION INFILTRATION; PERINEURAL at 11:51

## 2022-11-03 RX ADMIN — BETAMETHASONE SODIUM PHOSPHATE AND BETAMETHASONE ACETATE 12 MG: 3; 3 INJECTION, SUSPENSION INTRA-ARTICULAR; INTRALESIONAL; INTRAMUSCULAR; SOFT TISSUE at 11:51

## 2022-11-03 NOTE — PROGRESS NOTES
US GUIDED SACROILIAC JOINT INJECTION  Dx lateral sacroiliitis      After informed consent was provided and allergies verified, the patient was positioned appropriately. The SI joint was prepped and draped with betadine to provide sterile environment. After prepping and draping the Bilateral SI joints in the usual sterile fashion, 1 cc of Celestone with 2 cc of lidocaine were injected with an ultrasound-guided needle into both joints without any complications. Patient tolerated this well. Before aspiration/injection risks/benefits of a cortisone injection including infection, local skin irritation, skin atrophy, calcification, continued pain/discomfort, elevated blood sugar, burning, failure to relieve pain, possible late infection were discussed with patient. Post-procedure discomfort can be alleviated with additional medications/ice/elevation/rest over the first 24 hours as recommended. Patient verbalized understanding and wanted to proceed with planned procedure.     If fluid was aspirated it was sent for further studies  in sterile specimen container as ordered to the lab     Ultrasound images of the procedure were saved

## 2022-11-14 ENCOUNTER — OFFICE VISIT (OUTPATIENT)
Dept: FAMILY MEDICINE CLINIC | Age: 77
End: 2022-11-14
Payer: MEDICARE

## 2022-11-14 DIAGNOSIS — Z00.00 MEDICARE ANNUAL WELLNESS VISIT, SUBSEQUENT: Primary | ICD-10-CM

## 2022-11-14 PROCEDURE — G0439 PPPS, SUBSEQ VISIT: HCPCS | Performed by: NURSE PRACTITIONER

## 2022-11-14 PROCEDURE — G8484 FLU IMMUNIZE NO ADMIN: HCPCS | Performed by: NURSE PRACTITIONER

## 2022-11-14 PROCEDURE — 1123F ACP DISCUSS/DSCN MKR DOCD: CPT | Performed by: NURSE PRACTITIONER

## 2022-11-14 ASSESSMENT — PATIENT HEALTH QUESTIONNAIRE - PHQ9
SUM OF ALL RESPONSES TO PHQ QUESTIONS 1-9: 0
6. FEELING BAD ABOUT YOURSELF - OR THAT YOU ARE A FAILURE OR HAVE LET YOURSELF OR YOUR FAMILY DOWN: 0
9. THOUGHTS THAT YOU WOULD BE BETTER OFF DEAD, OR OF HURTING YOURSELF: 0
5. POOR APPETITE OR OVEREATING: 0
10. IF YOU CHECKED OFF ANY PROBLEMS, HOW DIFFICULT HAVE THESE PROBLEMS MADE IT FOR YOU TO DO YOUR WORK, TAKE CARE OF THINGS AT HOME, OR GET ALONG WITH OTHER PEOPLE: 0
3. TROUBLE FALLING OR STAYING ASLEEP: 0
8. MOVING OR SPEAKING SO SLOWLY THAT OTHER PEOPLE COULD HAVE NOTICED. OR THE OPPOSITE, BEING SO FIGETY OR RESTLESS THAT YOU HAVE BEEN MOVING AROUND A LOT MORE THAN USUAL: 0
1. LITTLE INTEREST OR PLEASURE IN DOING THINGS: 0
4. FEELING TIRED OR HAVING LITTLE ENERGY: 0
2. FEELING DOWN, DEPRESSED OR HOPELESS: 0
SUM OF ALL RESPONSES TO PHQ QUESTIONS 1-9: 0
7. TROUBLE CONCENTRATING ON THINGS, SUCH AS READING THE NEWSPAPER OR WATCHING TELEVISION: 0
SUM OF ALL RESPONSES TO PHQ QUESTIONS 1-9: 0
SUM OF ALL RESPONSES TO PHQ9 QUESTIONS 1 & 2: 0
SUM OF ALL RESPONSES TO PHQ QUESTIONS 1-9: 0

## 2022-11-14 ASSESSMENT — LIFESTYLE VARIABLES
HOW OFTEN DO YOU HAVE A DRINK CONTAINING ALCOHOL: NEVER
HOW MANY STANDARD DRINKS CONTAINING ALCOHOL DO YOU HAVE ON A TYPICAL DAY: PATIENT DOES NOT DRINK

## 2022-11-14 NOTE — PROGRESS NOTES
Medicare Annual Wellness Visit    Vivian Allison is here for No chief complaint on file. Assessment & Plan   Medicare annual wellness visit, subsequent    Recommendations for Preventive Services Due: see orders and patient instructions/AVS.  Recommended screening schedule for the next 5-10 years is provided to the patient in written form: see Patient Instructions/AVS.     Return for Medicare Annual Wellness Visit in 1 year. Subjective     Patient's complete Health Risk Assessment and screening values have been reviewed and are found in Flowsheets. The following problems were reviewed today and where indicated follow up appointments were made and/or referrals ordered. Positive Risk Factor Screenings with Interventions:    Fall Risk:  Do you feel unsteady or are you worried about falling? : (!) yes  2 or more falls in past year?: (!) yes  Fall with injury in past year?: no   Fall Risk Interventions:    Patient declines any further evaluation/treatment for this issue    Cognitive:   Words recalled: 2 Words Recalled  Total Score Interpretation: Abnormal Mini-Cog  Cognitive Impairment Interventions:  Patient declines any further evaluation/treatment for cognitive impairment           General Health and ACP:  General  In general, how would you say your health is?: Fair  In the past 7 days, have you experienced any of the following: New or Increased Pain, New or Increased Fatigue, Loneliness, Social Isolation, Stress or Anger?: No  Do you get the social and emotional support that you need?: Yes  Do you have a Living Will?: Yes    Advance Directives       Power of  Living Will ACP-Advance Directive ACP-Power of     Not on File Not on File Not on File Not on File          General Health Risk Interventions:  Poor self-assessment of health status: patient declines any further evaluation/treatment for this issue    Health Habits/Nutrition:  Physical Activity: Inactive    Days of Exercise per Week: 0 days Minutes of Exercise per Session: 10 min     Have you lost any weight without trying in the past 3 months?: No     Have you seen the dentist within the past year?: Yes  Health Habits/Nutrition Interventions:  Inadequate physical activity:  patient is not ready to increase his/her physical activity level at this time             Objective      Patient-Reported Vitals  No data recorded         Allergies   Allergen Reactions    Floxin [Ofloxacin] Anaphylaxis    Praluent [Alirocumab] Anaphylaxis    Questran [Cholestyramine] Anaphylaxis and Swelling    Lidocaine Oint & Men-Meth Jemal Swelling    Lovaza [Omega-3-Acid Ethyl Esters]     Niacin And Related     Statins     Tobramycin      rash    Tricor [Fenofibrate]      Prior to Visit Medications    Medication Sig Taking? Authorizing Provider   doxazosin (CARDURA) 4 MG tablet TAKE 1 TABLET NIGHTLY  Fabian Chavarria MD   DULoxetine (CYMBALTA) 60 MG extended release capsule TAKE 1 CAPSULE DAILY  Fabian Chavarria MD   carvedilol (COREG) 25 MG tablet TAKE 1 TABLET TWICE DAILY  WITH MEALS  RUMA Parnell CNP   HYDROcodone-acetaminophen (NORCO) 5-325 MG per tablet Take 1 tablet by mouth every 6 hours as needed for Pain for up to 5 days. Intended supply: 5 days. Take lowest dose possible to manage pain  RUMA Parnell CNP   Handicap Placard MISC by Does not apply route Duration of 1 year  Patient is high risk for fall  Fabian Chavarria MD   fluticasone-salmeterol (ADVAIR DISKUS) 500-50 MCG/DOSE diskus inhaler USE 1 INHALATION ORALLY    EVERY 12 HOURS  MD Laila Coto by Does not apply route Start 3-17-21 to 3-17-22  Patient not taking: No sig reported  Fabian Chavarria MD   EPINEPHrine (EPIPEN 2-DE) 0.3 MG/0.3ML SOAJ injection Inject 0.3 mLs into the muscle once for 1 dose Use as directed for allergic reaction.  Please give generic  Kerry Hdez MD   Multiple Vitamins-Minerals (MULTIVITAMIN PO) Take by mouth daily Historical Provider, MD Add[place] (Including outside providers/suppliers regularly involved in providing care):   Patient Care Team:  Evin Warren MD as PCP - General (Family Medicine)  Evin Warren MD as PCP - [facility] Empaneled Provider  Leoncio Finch MD as Consulting Physician (Pain Management)  Tanna Cabrera MD as Consulting Physician (Urology)     Reviewed and updated this visit:         was evaluated through a synchronous (real-time) audio-video encounter. The patient (or guardian if applicable) is aware that this is a billable service, which includes applicable co-pays. This Virtual Visit was conducted with patient's (and/or legal guardian's) consent. The visit was conducted pursuant to the emergency declaration under the [address] [place] [address] waiver authority and the [employer] General Act. Patient identification was verified, and a caregiver was present when appropriate.    The patient was located at Home: [place] [id number]  The provider was located at the facility: [address] [place] [address]

## 2022-11-14 NOTE — PATIENT INSTRUCTIONS
Personalized Preventive Plan for Dennie Ar - 11/14/2022  Medicare offers a range of preventive health benefits. Some of the tests and screenings are paid in full while other may be subject to a deductible, co-insurance, and/or copay. Some of these benefits include a comprehensive review of your medical history including lifestyle, illnesses that may run in your family, and various assessments and screenings as appropriate. After reviewing your medical record and screening and assessments performed today your provider may have ordered immunizations, labs, imaging, and/or referrals for you. A list of these orders (if applicable) as well as your Preventive Care list are included within your After Visit Summary for your review. Other Preventive Recommendations:    A preventive eye exam performed by an eye specialist is recommended every 1-2 years to screen for glaucoma; cataracts, macular degeneration, and other eye disorders. A preventive dental visit is recommended every 6 months. Try to get at least 150 minutes of exercise per week or 10,000 steps per day on a pedometer . Order or download the FREE \"Exercise & Physical Activity: Your Everyday Guide\" from [employer]. Call 8-484.998.6952 or search [employer] online. You need 8447-0540 mg of calcium and 1834-6344 IU of vitamin D per day. It is possible to meet your calcium requirement with diet alone, but a vitamin D supplement is usually necessary to meet this goal.  When exposed to the sun, use a sunscreen that protects against both UVA and UVB radiation with an SPF of 30 or greater. Reapply every 2 to 3 hours or after sweating, drying off with a towel, or swimming. Always wear a seat belt when traveling in a car. Always wear a helmet when riding a bicycle or motorcycle. Personalized Preventive Plan for Dennie Ar - 11/14/2022  Medicare offers a range of preventive health benefits.  Some of the tests and screenings are paid in full while other may be subject to a deductible, co-insurance, and/or copay. Some of these benefits include a comprehensive review of your medical history including lifestyle, illnesses that may run in your family, and various assessments and screenings as appropriate. After reviewing your medical record and screening and assessments performed today your provider may have ordered immunizations, labs, imaging, and/or referrals for you. A list of these orders (if applicable) as well as your Preventive Care list are included within your After Visit Summary for your review. Other Preventive Recommendations:    A preventive eye exam performed by an eye specialist is recommended every 1-2 years to screen for glaucoma; cataracts, macular degeneration, and other eye disorders. A preventive dental visit is recommended every 6 months. Try to get at least 150 minutes of exercise per week or 10,000 steps per day on a pedometer . Order or download the FREE \"Exercise & Physical Activity: Your Everyday Guide\" from [employer]. Call 6-453.599.1731 or search [employer] online. You need 4093-9614 mg of calcium and 8185-4665 IU of vitamin D per day. It is possible to meet your calcium requirement with diet alone, but a vitamin D supplement is usually necessary to meet this goal.  When exposed to the sun, use a sunscreen that protects against both UVA and UVB radiation with an SPF of 30 or greater. Reapply every 2 to 3 hours or after sweating, drying off with a towel, or swimming. Always wear a seat belt when traveling in a car. Always wear a helmet when riding a bicycle or motorcycle.

## 2022-11-14 NOTE — PROGRESS NOTES
Medicare Annual Wellness Visit    Benjamin Garza is here for No chief complaint on file. Assessment & Plan   Medicare annual wellness visit, subsequent    Recommendations for Preventive Services Due: see orders and patient instructions/AVS.  Recommended screening schedule for the next 5-10 years is provided to the patient in written form: see Patient Instructions/AVS.     Return for Medicare Annual Wellness Visit in 1 year. Subjective   {OPTIONAL - WILL AUTO-DELETE IF NOT BYSZ:[id number]}    Patient's complete Health Risk Assessment and screening values have been reviewed and are found in Flowsheets. The following problems were reviewed today and where indicated follow up appointments were made and/or referrals ordered. Positive Risk Factor Screenings with Interventions:    Fall Risk:  Do you feel unsteady or are you worried about falling? : (!) yes  2 or more falls in past year?: (!) yes  Fall with injury in past year?: no   Fall Risk Interventions:    {Medicare AWV Falls Risk Interventions:679863350}    Cognitive:   Words recalled: 2 Words Recalled  Total Score Interpretation: Abnormal Mini-Cog  Cognitive Impairment Interventions:  {Medicare AWV Cognitive Impairment Interventions:742187504}           General Health and ACP:  General  In general, how would you say your health is?: Fair  In the past 7 days, have you experienced any of the following: New or Increased Pain, New or Increased Fatigue, Loneliness, Social Isolation, Stress or Anger?: No  Do you get the social and emotional support that you need?: Yes  Do you have a Living Will?: Yes    Advance Directives       Power of  Living Will ACP-Advance Directive ACP-Power of     Not on File Not on File Not on File Not on File          General Health Risk Interventions:  {Medicare AWV General Health Risk Interventions:883254284}    Health Habits/Nutrition:  Physical Activity: Inactive    Days of Exercise per Week: 0 days    Minutes of Exercise per Session: 10 min     Have you lost any weight without trying in the past 3 months?: No     Have you seen the dentist within the past year?: Yes  Health Habits/Nutrition Interventions:  {Medicare AW Health Habits/Nutrition Interventions:944059258}             Objective      Patient-Reported Vitals  No data recorded   {OPTIONAL - GENERAL PHYSICAL EXAM (WILL AUTO-DELETE IF NOT IIQJ):[id number]}       Allergies   Allergen Reactions    Floxin [Ofloxacin] Anaphylaxis    Praluent [Alirocumab] Anaphylaxis    Questran [Cholestyramine] Anaphylaxis and Swelling    Lidocaine Oint & Men-Meth Jemal Swelling    Lovaza [Omega-3-Acid Ethyl Esters]     Niacin And Related     Statins     Tobramycin      rash    Tricor [Fenofibrate]      Prior to Visit Medications    Medication Sig Taking? Authorizing Provider   doxazosin (CARDURA) 4 MG tablet TAKE 1 TABLET NIGHTLY  Ilir Decker MD   DULoxetine (CYMBALTA) 60 MG extended release capsule TAKE 1 CAPSULE DAILY  Ilir Decker MD   carvedilol (COREG) 25 MG tablet TAKE 1 TABLET TWICE DAILY  WITH MEALS  RUMA Talbert CNP   HYDROcodone-acetaminophen (NORCO) 5-325 MG per tablet Take 1 tablet by mouth every 6 hours as needed for Pain for up to 5 days. Intended supply: 5 days. Take lowest dose possible to manage pain  Molly Tod Borders, APRN - CNP   Handicap Placard MISC by Does not apply route Duration of 1 year  Patient is high risk for fall  Ilir Decker MD   fluticasone-salmeterol (ADVAIR DISKUS) 500-50 MCG/DOSE diskus inhaler USE 1 INHALATION ORALLY    EVERY 12 HOURS  MD Laila Oneil by Does not apply route Start 3-17-21 to 3-17-22  Patient not taking: No sig reported  Ilir Decker MD   EPINEPHrine (EPIPEN 2-DE) 0.3 MG/0.3ML SOAJ injection Inject 0.3 mLs into the muscle once for 1 dose Use as directed for allergic reaction.  Please give generic  Abhi Zaidi MD   Multiple Vitamins-Minerals (MULTIVITAMIN PO) Take by mouth daily   Historical Provider, MD       CareTeam (Including outside providers/suppliers regularly involved in providing care):   Patient Care Team:  Mendy Aparicio MD as PCP - General (Family Medicine)  Mendy Aparicio MD as PCP - [facility] Empaneled Provider  Pricilla Bunch MD as Consulting Physician (Pain Management)  Corita Buerger, MD as Consulting Physician (Urology)     Reviewed and updated this visit:

## 2022-11-15 ENCOUNTER — HOSPITAL ENCOUNTER (OUTPATIENT)
Dept: CT IMAGING | Age: 77
Discharge: HOME | End: 2022-11-17
Payer: MEDICARE

## 2022-11-15 ENCOUNTER — OFFICE VISIT (OUTPATIENT)
Dept: FAMILY MEDICINE CLINIC | Age: 77
End: 2022-11-15
Payer: COMMERCIAL

## 2022-11-15 VITALS
HEART RATE: 86 BPM | TEMPERATURE: 97.9 F | HEIGHT: 65 IN | SYSTOLIC BLOOD PRESSURE: 130 MMHG | BODY MASS INDEX: 37.82 KG/M2 | WEIGHT: 227 LBS | RESPIRATION RATE: 14 BRPM | DIASTOLIC BLOOD PRESSURE: 82 MMHG | OXYGEN SATURATION: 95 %

## 2022-11-15 DIAGNOSIS — R10.32 ABDOMINAL PAIN, LEFT LOWER QUADRANT: ICD-10-CM

## 2022-11-15 DIAGNOSIS — R10.32 ABDOMINAL PAIN, LEFT LOWER QUADRANT: Primary | ICD-10-CM

## 2022-11-15 DIAGNOSIS — R19.4 CHANGE IN BOWEL HABITS: ICD-10-CM

## 2022-11-15 LAB
BILIRUBIN, POC: NORMAL
BLOOD URINE, POC: NORMAL
CLARITY, POC: CLEAR
COLOR, POC: YELLOW
GLUCOSE URINE, POC: NORMAL
KETONES, POC: NORMAL
LEUKOCYTE EST, POC: NORMAL
NITRITE, POC: NORMAL
PH, POC: 6
PROTEIN, POC: NORMAL
SPECIFIC GRAVITY, POC: 1.03
UROBILINOGEN, POC: 0.2

## 2022-11-15 PROCEDURE — 81003 URINALYSIS AUTO W/O SCOPE: CPT | Performed by: FAMILY MEDICINE

## 2022-11-15 PROCEDURE — G8484 FLU IMMUNIZE NO ADMIN: HCPCS | Performed by: FAMILY MEDICINE

## 2022-11-15 PROCEDURE — G8399 PT W/DXA RESULTS DOCUMENT: HCPCS | Performed by: FAMILY MEDICINE

## 2022-11-15 PROCEDURE — G8427 DOCREV CUR MEDS BY ELIG CLIN: HCPCS | Performed by: FAMILY MEDICINE

## 2022-11-15 PROCEDURE — 3078F DIAST BP <80 MM HG: CPT | Performed by: FAMILY MEDICINE

## 2022-11-15 PROCEDURE — 1036F TOBACCO NON-USER: CPT | Performed by: FAMILY MEDICINE

## 2022-11-15 PROCEDURE — 1090F PRES/ABSN URINE INCON ASSESS: CPT | Performed by: FAMILY MEDICINE

## 2022-11-15 PROCEDURE — 1123F ACP DISCUSS/DSCN MKR DOCD: CPT | Performed by: FAMILY MEDICINE

## 2022-11-15 PROCEDURE — 3074F SYST BP LT 130 MM HG: CPT | Performed by: FAMILY MEDICINE

## 2022-11-15 PROCEDURE — 99214 OFFICE O/P EST MOD 30 MIN: CPT | Performed by: FAMILY MEDICINE

## 2022-11-15 PROCEDURE — 6360000004 HC RX CONTRAST MEDICATION: Performed by: FAMILY MEDICINE

## 2022-11-15 PROCEDURE — G8417 CALC BMI ABV UP PARAM F/U: HCPCS | Performed by: FAMILY MEDICINE

## 2022-11-15 PROCEDURE — 74177 CT ABD & PELVIS W/CONTRAST: CPT

## 2022-11-15 RX ADMIN — IOPAMIDOL 100 ML: 612 INJECTION, SOLUTION INTRAVENOUS at 14:36

## 2022-11-15 ASSESSMENT — ENCOUNTER SYMPTOMS
CONSTIPATION: 1
ABDOMINAL PAIN: 1

## 2022-11-15 NOTE — PROGRESS NOTES
[address]  PHYSICAL THERAPY PLAN OF CARE   [address], [address] [facility]         Ph: [phone]  Fax: [phone]    [] Certification  [] Recertification []  Plan of Care  [] Progress Note [x] Discharge      Referring Provider: Art Dudley DO     From:  Chichi Jones, PT,DPT  Patient: Dennie Ar (15 y.o. 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]  Bilateral sacroiliitis ([facility] [address] [M46.1]       Treatment Diagnosis: LBP, B LE pain, decreased lumbopelvic AROM, decreased B Le and core strength, decreased posture, decreased LE flexibility, impaired gait, decreased balance, and decreased activity tolerance    Plan of Care/Certification Expiration Date: 22   Progress Report Period from:  2022  to 10/27/2022    Visits to Date: 7 No Show: 0 Cancelled Appts: 1    OBJECTIVE:   Short Term Goals - Time Frame for Short Term Goals: 2-3 weeks    Goals Current/Discharge status  Status   Short Term Goal 1: The pt will demonstrate improved postural awareness requiring <25% VC's with exercises  STG 1 Current Status[de-identified] Pt needs VCs 25-50% of the time. Partially met   Short Term Goal 2: The pt will have decreased LB pain </= 5/10 at worst in order to increase ease with ADL's  STG 2 Current Status[de-identified] Pt reports pain of 4-5/10 daily with night time the worse.  2/10 at best   Partially met     Long Term Goals - Time Frame for Long Term Goals : 5 weeks  Goals Current/ Discharge status Status   Long Term Goal 1: The pt will have a decrease in ANGELIC score >/=10 points in order to increase functional activity tolerance LTG 1 Current Status[de-identified] 13/50 ( 19 point improvement from previous score)   Met   Long Term Goal 2: The pt will be indep/compliant with HEP in order to self manage symptoms upon D/C Poor compliance with current HEP    Not Met   Long Term Goal 3: The pt will demo improved B LE strength >/= 4+/5 and core strength >/=fair in order to safely ambulate with decreased pain/limitations Strength LLE  L Hip Flexion: 4/5  L Hip Extension: 4/5  L Hip ABduction: 4/5  L Hip Internal Rotation: 5/5  L Hip External Rotation: 5/5  L Knee Flexion: 5/5  L Knee Extension: 4+/5  L Ankle Dorsiflexion: 4/5  Strength RLE  R Hip Flexion: 4/5  R Hip Extension: 4/5  R Hip ABduction: 4/5  R Hip Internal Rotation: 5/5  R Hip External Rotation: 5/5  R Knee Flexion: 5/5  R Knee Extension: 4+/5  R Ankle Dorsiflexion: 4+/5      Trunk Strength  Trunk Flexion: 1/5 Partially met   Long Term Goal 4: The pt will demo improved lumbar AROM >/=25% ea. B hip IR/ER AROM >/=40*, and R hip flexion >/=100* in order to increase ease with ADL's LTG 4 Current Status[de-identified] lumbar flex 75%, ext 75%, SB B 50%, rotation 75% B; L IR 46, ER 23; R IR 35, ER 45; L hip flex 108, R 102 * unchanged from measurments taken on 10/18/22   Partially met   Long Term Goal 5: The pt will demo improved B SLS >/=5 seconds to increase ease and safety with ambulation LTG 5 Current Status[de-identified] Pt unable to hold SLS without UE support for longer than 1-3 sec before LOB. Not Met     Body Structures, Functions, Activity Limitations Requiring Skilled Therapeutic Intervention: Decreased functional mobility , Decreased ADL status, Decreased ROM, Increased pain, Decreased posture, Decreased balance, Decreased strength  Assessment: Please refer to PN dated 10/27/22 for formal assessment. Pt placed on hold at that time with minimal progress to increase compliance with HEP. Spoke w/ pt 11/15/22 who states she has other medical issues she is taking care of right now and would like to be D/C'd from PT. Therapy Prognosis: Good    PLAN:   D/C     Precautions: high falls risk, B TKR                         Patient Status:[] Continue/ Initiate plan of Care     [x] Discharge PT. Recommend pt continue with HEP.       [] Additional visits requested, Please re-certify for additional visits:     [] Hold        Signature: Electronically signed by Grzegorz Biggs PT on 11/15/2022 at 12:02 PM  Objective info by: Electronically signed by Ratna Ramos PTA on 11/15/2022 at 11:43 AM    If you have any questions or concerns, please don't hesitate to call. Thank you for your referral.    I have reviewed this plan of care and certify a need for medically necessary rehabilitation services.     Physician Signature:__________________________________________________________  Date:  Please sign and return

## 2022-11-15 NOTE — PROGRESS NOTES
Subjective:      Patient ID: Bernadine Marquez is a 65 y.o. female    Abdominal Pain  Associated symptoms include constipation. Pertinent negatives include no fever. Constipation  Associated symptoms include abdominal pain. Pertinent negatives include no fever. Here with acute visit. Has had 3-4 weeks of constipation. No emesis. Taking stool softener otc which has not been helping. No blood in stool. Stools seem hard and in small amounts. Small stool today. Last colonoscopy done -10 years ago. Review of Systems   Constitutional:  Negative for chills, fever and unexpected weight change. Gastrointestinal:  Positive for abdominal pain and constipation. Skin:  Negative for rash. Neurological:  Negative for weakness. Reviewed allergy, medical, social, surgical, family and med list changes and updated   Files     Social History     Socioeconomic History    Marital status:    Tobacco Use    Smoking status: Never    Smokeless tobacco: Never   Substance and Sexual Activity    Alcohol use: No    Drug use: No   Social History Narrative    ** Merged History Encounter **          Social Determinants of Health     Financial Resource Strain: Low Risk     Difficulty of Paying Living Expenses: Not hard at all   Food Insecurity: No Food Insecurity    Worried About Running Out of Food in the Last Year: Never true    Ran Out of Food in the Last Year: Never true   Transportation Needs: No Transportation Needs    Lack of Transportation (Medical): No    Lack of Transportation (Non-Medical):  No   Physical Activity: Inactive    Days of Exercise per Week: 0 days    Minutes of Exercise per Session: 10 min     Current Outpatient Medications   Medication Sig Dispense Refill    doxazosin (CARDURA) 4 MG tablet TAKE 1 TABLET NIGHTLY 90 tablet 0    DULoxetine (CYMBALTA) 60 MG extended release capsule TAKE 1 CAPSULE DAILY 90 capsule 0    carvedilol (COREG) 25 MG tablet TAKE 1 TABLET TWICE DAILY  WITH MEALS 180 tablet 0 Handicap Placard MISC by Does not apply route Duration of 1 year  Patient is high risk for fall 1 each 0    fluticasone-salmeterol (ADVAIR DISKUS) 500-50 MCG/DOSE diskus inhaler USE 1 INHALATION ORALLY    EVERY 12 HOURS 3 Inhaler 1    Handicap Placard MISC by Does not apply route Start 3-17-21 to 3-17-22 1 each 0    Multiple Vitamins-Minerals (MULTIVITAMIN PO) Take by mouth daily       HYDROcodone-acetaminophen (NORCO) 5-325 MG per tablet Take 1 tablet by mouth every 6 hours as needed for Pain for up to 5 days. Intended supply: 5 days. Take lowest dose possible to manage pain 20 tablet 0    EPINEPHrine (EPIPEN 2-DE) 0.3 MG/0.3ML SOAJ injection Inject 0.3 mLs into the muscle once for 1 dose Use as directed for allergic reaction. Please give generic 0.3 mL 0     Current Facility-Administered Medications   Medication Dose Route Frequency Provider Last Rate Last Admin    betamethasone acetate-betamethasone sodium phosphate (CELESTONE) injection 6 mg  6 mg Intra-LESional Once Dwaine Asencio MD         Family History   Problem Relation Age of Onset    Cancer Mother         BREAST, COLON    Breast Cancer Mother     High Blood Pressure Father      Past Medical History:   Diagnosis Date    Hyperlipidemia     Hypertension     Stroke (cerebrum) ([facility] [address] 08/01/2015    TIA (transient ischemic attack)      Objective:   /82   Pulse 86   Temp 97.9 °F (36.6 °C)   Resp 14   Ht 5' 5\" (1.651 m)   Wt 227 lb (103 kg)   LMP  (LMP Unknown)   SpO2 95%   BMI 37.77 kg/m²     Physical Exam  Lungs:Clear  Equal b.s bilaterally  Heart:  Rate reg     No murmur  Back:       No  CVA tenderness  Abdomen: B.S present   Soft           Mild LLQ  Tenderness          No Rebound                    No hepatosplenomegaly. No masses. Gen:      No acute distress  Skin:      No rashes    Assessment:       Diagnosis Orders   1.  Abdominal pain, left lower quadrant  Culture, Urine    CT ABDOMEN PELVIS W IV CONTRAST Additional Contrast? Radiologist Recommendation      2. Change in bowel habits  CT ABDOMEN PELVIS W IV CONTRAST Additional Contrast? Radiologist Recommendation             Plan:      Orders Placed This Encounter   Procedures    Culture, Urine     Standing Status:   Future     Standing Expiration Date:   11/15/2023     Order Specific Question:   Specify (ex-cath, midstream, cysto, etc)?      Answer:   mid    CT ABDOMEN PELVIS W IV CONTRAST Additional Contrast? Radiologist Recommendation     Standing Status:   Future     Standing Expiration Date:   11/15/2023     Order Specific Question:   Additional Contrast?     Answer:   Radiologist Recommendation     Order Specific Question:   STAT Creatinine as needed:     Answer:   Yes    POCT Urinalysis No Micro (Auto)   F/u after ct done

## 2022-11-16 DIAGNOSIS — R19.4 CHANGE IN BOWEL HABITS: Primary | ICD-10-CM

## 2022-11-17 LAB — URINE CULTURE, ROUTINE: NORMAL

## 2022-12-11 RX ORDER — DULOXETIN HYDROCHLORIDE 60 MG/1
CAPSULE, DELAYED RELEASE ORAL
Qty: 90 CAPSULE | Refills: 0 | Status: SHIPPED | OUTPATIENT
Start: 2022-12-11

## 2022-12-16 DIAGNOSIS — I10 PRIMARY HYPERTENSION: ICD-10-CM

## 2022-12-16 RX ORDER — CARVEDILOL 25 MG/1
TABLET ORAL
Qty: 180 TABLET | Refills: 0 | Status: SHIPPED | OUTPATIENT
Start: 2022-12-16 | End: 2023-02-07 | Stop reason: SDUPTHER

## 2022-12-22 NOTE — TELEPHONE ENCOUNTER
Called and spoke to [place] letting her know that her Carvedilol was refilled but Dr. Marisabel Sevilla would like her to make an appointment, She will call back to schedule.

## 2023-01-03 ENCOUNTER — OFFICE VISIT (OUTPATIENT)
Dept: GASTROENTEROLOGY | Age: 78
End: 2023-01-03
Payer: MEDICARE

## 2023-01-03 VITALS — BODY MASS INDEX: 37.82 KG/M2 | HEIGHT: 65 IN | HEART RATE: 62 BPM | WEIGHT: 227 LBS | OXYGEN SATURATION: 98 %

## 2023-01-03 DIAGNOSIS — R19.8 ALTERNATING CONSTIPATION AND DIARRHEA: ICD-10-CM

## 2023-01-03 DIAGNOSIS — Z80.0 FAMILY HISTORY OF COLON CANCER IN MOTHER: Primary | ICD-10-CM

## 2023-01-03 DIAGNOSIS — E66.01 SEVERE OBESITY (BMI 35.0-39.9) WITH COMORBIDITY (HCC): ICD-10-CM

## 2023-01-03 PROCEDURE — 1036F TOBACCO NON-USER: CPT | Performed by: INTERNAL MEDICINE

## 2023-01-03 PROCEDURE — 1123F ACP DISCUSS/DSCN MKR DOCD: CPT | Performed by: INTERNAL MEDICINE

## 2023-01-03 PROCEDURE — G8484 FLU IMMUNIZE NO ADMIN: HCPCS | Performed by: INTERNAL MEDICINE

## 2023-01-03 PROCEDURE — G8417 CALC BMI ABV UP PARAM F/U: HCPCS | Performed by: INTERNAL MEDICINE

## 2023-01-03 PROCEDURE — 99203 OFFICE O/P NEW LOW 30 MIN: CPT | Performed by: INTERNAL MEDICINE

## 2023-01-03 PROCEDURE — G8427 DOCREV CUR MEDS BY ELIG CLIN: HCPCS | Performed by: INTERNAL MEDICINE

## 2023-01-03 PROCEDURE — G8399 PT W/DXA RESULTS DOCUMENT: HCPCS | Performed by: INTERNAL MEDICINE

## 2023-01-03 PROCEDURE — 1090F PRES/ABSN URINE INCON ASSESS: CPT | Performed by: INTERNAL MEDICINE

## 2023-01-03 RX ORDER — SODIUM, POTASSIUM,MAG SULFATES 17.5-3.13G
SOLUTION, RECONSTITUTED, ORAL ORAL
Qty: 354 ML | Refills: 0 | Status: SHIPPED | OUTPATIENT
Start: 2023-01-03

## 2023-01-03 RX ORDER — ALUMINUM ZIRCONIUM OCTACHLOROHYDREX GLY 16 G/100G
GEL TOPICAL
Qty: 425 G | Refills: 2 | Status: SHIPPED | OUTPATIENT
Start: 2023-01-03

## 2023-01-03 RX ORDER — POLYETHYLENE GLYCOL 3350 17 G/17G
10 POWDER, FOR SOLUTION ORAL DAILY
Qty: 510 G | Refills: 3 | Status: SHIPPED | OUTPATIENT
Start: 2023-01-03 | End: 2023-02-02

## 2023-01-03 NOTE — PROGRESS NOTES
Gastroenterology Clinic Visit    Stephanie Parham  [id number]  Chief Complaint   Patient presents with    New Patient     HPI: 65 y.o. female presents to the clinic with longstanding symptoms of alternating diarrhea and constipation, she reports having symptoms for many years. She additionally reports GI issues in many of her family members including history of colon cancer in her mother in her [de-identified]. Patient s/p colonoscopy 7 years ago with findings of polyps. She reports symptoms of abdominal bloating, history of diverticulosis. With regards to her bowel movements she has small hard stools on medication. She has intermittent loose bowel movements. But mostly tendency towards constipation she denies any blood in the stool. She has had significant weight gain over the last few years, reports 20 pounds of weight gain over the last 4 years. Previous GI work up/Endoscopic investigations:   Colonoscopy 7 years ago with polyps    Review of Systems   All other systems reviewed and are negative.      Past Medical History:   Diagnosis Date    Hyperlipidemia     Hypertension     Stroke (cerebrum) ([facility] [address] 08/01/2015    TIA (transient ischemic attack)      Past Surgical History:   Procedure Laterality Date    APPENDECTOMY      BREAST BIOPSY Right     benign    CATARACT REMOVAL      HAND SURGERY      RIGHT    HERNIA REPAIR Left 5/23/2019    primary repairs  and [facility]    TONSILLECTOMY AND ADENOIDECTOMY      UMBILICAL HERNIA REPAIR N/A 5/23/2019    Primary repair      Current Outpatient Medications on File Prior to Visit   Medication Sig Dispense Refill    carvedilol (COREG) 25 MG tablet TAKE 1 TABLET TWICE DAILY  WITH MEALS 180 tablet 0    DULoxetine (CYMBALTA) 60 MG extended release capsule TAKE 1 CAPSULE DAILY 90 capsule 0    doxazosin (CARDURA) 4 MG tablet TAKE 1 TABLET NIGHTLY 90 tablet 0    Handicap Placard MISC by Does not apply route Duration of 1 year  Patient is high risk for fall 1 each 0    fluticasone-salmeterol (ADVAIR DISKUS) 500-50 MCG/DOSE diskus inhaler USE 1 INHALATION ORALLY    EVERY 12 HOURS 3 Inhaler 1    Handicap Placard MISC by Does not apply route Start 3-17-21 to 3-17-22 1 each 0    Multiple Vitamins-Minerals (MULTIVITAMIN PO) Take by mouth daily       HYDROcodone-acetaminophen (NORCO) 5-325 MG per tablet Take 1 tablet by mouth every 6 hours as needed for Pain for up to 5 days. Intended supply: 5 days. Take lowest dose possible to manage pain 20 tablet 0    EPINEPHrine (EPIPEN 2-DE) 0.3 MG/0.3ML SOAJ injection Inject 0.3 mLs into the muscle once for 1 dose Use as directed for allergic reaction. Please give generic 0.3 mL 0     Current Facility-Administered Medications on File Prior to Visit   Medication Dose Route Frequency Provider Last Rate Last Admin    betamethasone acetate-betamethasone sodium phosphate (CELESTONE) injection 6 mg  6 mg Intra-LESional Once Eliu Rodas MD         Family History   Problem Relation Age of Onset    Colon Cancer Mother     Cancer Mother         BREAST, COLON    Breast Cancer Mother     High Blood Pressure Father      Social History     Socioeconomic History    Marital status:    Tobacco Use    Smoking status: Never    Smokeless tobacco: Never   Substance and Sexual Activity    Alcohol use: No    Drug use: No   Social History Narrative    ** Merged History Encounter **          Social Determinants of Health     Financial Resource Strain: Low Risk     Difficulty of Paying Living Expenses: Not hard at all   Food Insecurity: No Food Insecurity    Worried About [facility] in the Last Year: Never true    Ran Out of Food in the Last Year: Never true   Transportation Needs: No Transportation Needs    Lack of Transportation (Medical): No    Lack of Transportation (Non-Medical):  No   Physical Activity: Inactive    Days of Exercise per Week: 0 days    Minutes of Exercise per Session: 10 min     Pulse 62, height 5' 5\" (1.651 m), weight 227 lb (103 kg), SpO2 98 %, not currently breastfeeding. Physical Exam  Constitutional:       General: She is not in acute distress. Appearance: Normal appearance. She is well-developed. Comments: Central obesity   Eyes:      General: No scleral icterus. Cardiovascular:      Rate and Rhythm: Normal rate and regular rhythm. Pulmonary:      Effort: Pulmonary effort is normal.      Breath sounds: Normal breath sounds. Abdominal:      General: Bowel sounds are normal. There is no distension. Palpations: Abdomen is soft. There is no mass. Tenderness: There is no abdominal tenderness. There is no guarding or rebound. Musculoskeletal:         General: Normal range of motion. Lymphadenopathy:      Cervical: No cervical adenopathy. Neurological:      Mental Status: She is alert and oriented to person, place, and time. Psychiatric:         Behavior: Behavior normal.         Thought Content: Thought content normal.         Judgment: Judgment normal.       Laboratory, Pathology, Radiology reviewed indetail with relevant important investigations summarized below:  Lab Results   Component Value Date    WBC 5.9 08/23/2021    HGB 13.4 08/23/2021    HCT 39.8 08/23/2021    MCV 92.4 08/23/2021     08/23/2021     No results found for: IRON, TIBC, FERRITIN  Lab Results   Component Value Date    [id number] 495 05/24/2018      Lab Results   Component Value Date    FOLATE >23.30 05/24/2018     Lab Results   Component Value Date    LABALBU 3.9 08/23/2021      Lab Results   Component Value Date    ALT 15 08/23/2021    AST 25 08/23/2021    ALKPHOS 86 08/23/2021    BILITOT 0.5 08/23/2021     CT Result (most recent):  CT ABDOMEN PELVIS W IV CONTRAST 11/15/2022    Narrative  EXAMINATION:  CT OF THE ABDOMEN AND PELVIS WITH CONTRAST 11/15/2022 2:34 pm    TECHNIQUE:  CT of the abdomen and pelvis was performed with the administration of  intravenous contrast. Multiplanar reformatted images are provided for review.   Automated exposure control, iterative reconstruction, and/or weight based  adjustment of the mA/kV was utilized to reduce the radiation dose to as low  as reasonably achievable. COMPARISON:  None. HISTORY:  ORDERING SYSTEM PROVIDED HISTORY: Abdominal pain, left lower quadrant, Change  in bowel habits  TECHNOLOGIST PROVIDED HISTORY:  Additional Contrast?->Radiologist Recommendation  STAT Creatinine as needed:->Yes  What reading provider will be dictating this exam?->CRC    FINDINGS:  Lower Chest: No infiltrates or effusion. Minimal scarring at the lung bases. Organs: There is 2.1 cm cyst in the right hepatic lobe. Gallbladder is  present with no calcified stones. Spleen is not enlarged. Pancreas and  adrenal glands appear unremarkable. There is symmetric enhancement of the  kidneys with no hydronephrosis or perinephric infiltration. GI/Bowel: There are no obstructing or constricting lesions. Extensive  colonic diverticulosis with no evidence of diverticulitis. Pelvis: Bladder is decompressed. The uterus is present and atrophic. There  is no significant free fluid. Peritoneum/Retroperitoneum: No free air or significant adenopathy. Bones/Soft Tissues: Unremarkable. Impression  No acute process in the abdomen and pelvis. Colonic diverticulosis with no evidence of diverticulitis. Constipation. Assessment and Plan:  65 y.o. female with clinical history consistent with IBS with alternating diarrhea and constipation versus chronic idiopathic constipation. Additionally with family history of colon cancer in mother and the GI issues in many family members. 1. Family history of colon cancer in mother  -Colonoscopy is indicated, procedure discussed at length, including the risks and benefits. Split prep was prescribed and discussed. Importance of the prep in ensuring a good exam was emphasized. - sodium-potassium-mag sulfate (SUPREP) 17.5-3.13-1.6 GM/177ML SOLN solution;  As directed  Dispense: 354 mL; Refill: 0  -Recommend adequate treatment of constipation prior to scheduling colonoscopy, additionally recommend extended prep    2. Alternating constipation and diarrhea  - Lifestyle modification  and Dietary changes discussed  - Fiber supplementation to promote regular bowel movements, issue with fiber increasing gas and bloating discussed  - Add Miralax 10 g/day to fiber to titrate to 1 to 2 soft BM/d without straining. 3. Severe obesity (BMI 35.0-39. 9) with comorbidity ([address]  -Dietary changes, calorie restriction    Return in about 2 months (around 3/3/2023). Lois Nguyen MD   Staff Gastroenterologist  [facility]    Please note this report has been partially produced using speech recognition software and may cause or contain errors related to thatsystem including grammar, punctuation and spelling as well as words and phrases that may seem inappropriate. If there are questions or concerns please feel free to contact me to clarify.

## 2023-01-13 ENCOUNTER — TELEPHONE (OUTPATIENT)
Dept: FAMILY MEDICINE CLINIC | Age: 78
End: 2023-01-13

## 2023-01-13 DIAGNOSIS — Z91.81 AT HIGH RISK FOR FALLS: ICD-10-CM

## 2023-01-13 DIAGNOSIS — F33.2 SEVERE EPISODE OF RECURRENT MAJOR DEPRESSIVE DISORDER, WITHOUT PSYCHOTIC FEATURES (HCC): ICD-10-CM

## 2023-01-13 NOTE — TELEPHONE ENCOUNTER
----- Message from Raul sent at 1/13/2023  1:42 PM EST -----  Subject: Message to Provider    QUESTIONS  Information for Provider? Patient is requesting PCP order routine lab work   for her to have done. She is requesting a call to let her know when she   can get them done.  ---------------------------------------------------------------------------  --------------  Daryle Haver INFO  [phone]; OK to leave message on voicemail  ---------------------------------------------------------------------------  --------------  SCRIPT ANSWERS  Relationship to Patient?  Self

## 2023-01-13 NOTE — TELEPHONE ENCOUNTER
----- Message from Raul sent at 1/13/2023  1:38 PM EST -----  Subject: Message to Provider    QUESTIONS  Information for Provider? Patient is requesting PCP write a new   prescription for her handicap parking placard. Her expiration of current   placard is in March. She is requesting a call when that is ready for her   to .  ---------------------------------------------------------------------------  --------------  [address] [employer] [facility]  [phone]; OK to leave message on voicemail  ---------------------------------------------------------------------------  --------------  SCRIPT ANSWERS  Relationship to Patient?  Self

## 2023-01-19 DIAGNOSIS — I10 ESSENTIAL HYPERTENSION: ICD-10-CM

## 2023-01-19 RX ORDER — DOXAZOSIN MESYLATE 4 MG/1
TABLET ORAL
Qty: 90 TABLET | Refills: 0 | Status: SHIPPED | OUTPATIENT
Start: 2023-01-19 | End: 2023-02-07 | Stop reason: SDUPTHER

## 2023-01-20 DIAGNOSIS — M17.0 ARTHRITIS OF BOTH KNEES: ICD-10-CM

## 2023-01-20 DIAGNOSIS — M54.9 CHRONIC BACK PAIN, UNSPECIFIED BACK LOCATION, UNSPECIFIED BACK PAIN LATERALITY: Primary | ICD-10-CM

## 2023-01-20 DIAGNOSIS — Z91.81 AT HIGH RISK FOR FALLS: ICD-10-CM

## 2023-01-20 DIAGNOSIS — G89.29 CHRONIC BACK PAIN, UNSPECIFIED BACK LOCATION, UNSPECIFIED BACK PAIN LATERALITY: Primary | ICD-10-CM

## 2023-01-20 DIAGNOSIS — F33.2 SEVERE EPISODE OF RECURRENT MAJOR DEPRESSIVE DISORDER, WITHOUT PSYCHOTIC FEATURES (HCC): ICD-10-CM

## 2023-01-23 ENCOUNTER — TELEPHONE (OUTPATIENT)
Dept: FAMILY MEDICINE CLINIC | Age: 78
End: 2023-01-23

## 2023-02-03 ENCOUNTER — TELEPHONE (OUTPATIENT)
Dept: FAMILY MEDICINE CLINIC | Age: 78
End: 2023-02-03

## 2023-02-03 DIAGNOSIS — I10 ESSENTIAL HYPERTENSION: Primary | ICD-10-CM

## 2023-02-03 NOTE — TELEPHONE ENCOUNTER
PT called she has an appt on Tuesday 2/7 wants to know if any labs are needed prior to her appt. Please let PT know if needed.

## 2023-02-06 DIAGNOSIS — I10 ESSENTIAL HYPERTENSION: ICD-10-CM

## 2023-02-06 LAB
ALBUMIN SERPL-MCNC: 4.1 G/DL (ref 3.5–4.6)
ALP BLD-CCNC: 86 U/L (ref 40–130)
ALT SERPL-CCNC: 12 U/L (ref 0–33)
ANION GAP SERPL CALCULATED.3IONS-SCNC: 16 MEQ/L (ref 9–15)
AST SERPL-CCNC: 22 U/L (ref 0–35)
BASOPHILS ABSOLUTE: 0.1 K/UL (ref 0–0.2)
BASOPHILS RELATIVE PERCENT: 0.8 %
BILIRUB SERPL-MCNC: 0.5 MG/DL (ref 0.2–0.7)
BUN BLDV-MCNC: 12 MG/DL (ref 8–23)
CALCIUM SERPL-MCNC: 9.5 MG/DL (ref 8.5–9.9)
CHLORIDE BLD-SCNC: 101 MEQ/L (ref 95–107)
CHOLESTEROL, FASTING: 218 MG/DL (ref 0–199)
CO2: 27 MEQ/L (ref 20–31)
CREAT SERPL-MCNC: 0.82 MG/DL (ref 0.5–0.9)
EOSINOPHILS ABSOLUTE: 0.6 K/UL (ref 0–0.7)
EOSINOPHILS RELATIVE PERCENT: 6.6 %
GFR SERPL CREATININE-BSD FRML MDRD: >60 ML/MIN/{1.73_M2}
GLOBULIN: 3.2 G/DL (ref 2.3–3.5)
GLUCOSE FASTING: 119 MG/DL (ref 70–99)
HCT VFR BLD CALC: 42.9 % (ref 37–47)
HDLC SERPL-MCNC: 47 MG/DL (ref 40–59)
HEMOGLOBIN: 14.1 G/DL (ref 12–16)
LDL CHOLESTEROL CALCULATED: 129 MG/DL (ref 0–129)
LYMPHOCYTES ABSOLUTE: 1.6 K/UL (ref 1–4.8)
LYMPHOCYTES RELATIVE PERCENT: 19 %
MCH RBC QN AUTO: 29.9 PG (ref 27–31.3)
MCHC RBC AUTO-ENTMCNC: 32.8 % (ref 33–37)
MCV RBC AUTO: 91.2 FL (ref 79.4–94.8)
MONOCYTES ABSOLUTE: 0.6 K/UL (ref 0.2–0.8)
MONOCYTES RELATIVE PERCENT: 6.7 %
NEUTROPHILS ABSOLUTE: 5.8 K/UL (ref 1.4–6.5)
NEUTROPHILS RELATIVE PERCENT: 66.9 %
PDW BLD-RTO: 13.6 % (ref 11.5–14.5)
PLATELET # BLD: 196 K/UL (ref 130–400)
POTASSIUM SERPL-SCNC: 4.3 MEQ/L (ref 3.4–4.9)
RBC # BLD: 4.71 M/UL (ref 4.2–5.4)
SODIUM BLD-SCNC: 144 MEQ/L (ref 135–144)
TOTAL PROTEIN: 7.3 G/DL (ref 6.3–8)
TRIGLYCERIDE, FASTING: 210 MG/DL (ref 0–150)
WBC # BLD: 8.6 K/UL (ref 4.8–10.8)

## 2023-02-07 ENCOUNTER — OFFICE VISIT (OUTPATIENT)
Dept: FAMILY MEDICINE CLINIC | Age: 78
End: 2023-02-07
Payer: MEDICARE

## 2023-02-07 VITALS
HEIGHT: 65 IN | WEIGHT: 226.6 LBS | DIASTOLIC BLOOD PRESSURE: 78 MMHG | OXYGEN SATURATION: 97 % | HEART RATE: 76 BPM | RESPIRATION RATE: 14 BRPM | BODY MASS INDEX: 37.75 KG/M2 | TEMPERATURE: 97.9 F | SYSTOLIC BLOOD PRESSURE: 120 MMHG

## 2023-02-07 DIAGNOSIS — R73.03 PRE-DIABETES: ICD-10-CM

## 2023-02-07 DIAGNOSIS — F33.2 SEVERE EPISODE OF RECURRENT MAJOR DEPRESSIVE DISORDER, WITHOUT PSYCHOTIC FEATURES (HCC): ICD-10-CM

## 2023-02-07 DIAGNOSIS — I48.0 PAROXYSMAL A-FIB (HCC): ICD-10-CM

## 2023-02-07 DIAGNOSIS — I10 ESSENTIAL HYPERTENSION: Primary | ICD-10-CM

## 2023-02-07 DIAGNOSIS — M46.1 BILATERAL SACROILIITIS (HCC): ICD-10-CM

## 2023-02-07 DIAGNOSIS — Z86.73 HISTORY OF CVA (CEREBROVASCULAR ACCIDENT): ICD-10-CM

## 2023-02-07 DIAGNOSIS — E78.00 PURE HYPERCHOLESTEROLEMIA: ICD-10-CM

## 2023-02-07 DIAGNOSIS — J45.40 MODERATE PERSISTENT ASTHMA WITHOUT COMPLICATION: ICD-10-CM

## 2023-02-07 PROCEDURE — G8417 CALC BMI ABV UP PARAM F/U: HCPCS | Performed by: FAMILY MEDICINE

## 2023-02-07 PROCEDURE — 3078F DIAST BP <80 MM HG: CPT | Performed by: FAMILY MEDICINE

## 2023-02-07 PROCEDURE — 1123F ACP DISCUSS/DSCN MKR DOCD: CPT | Performed by: FAMILY MEDICINE

## 2023-02-07 PROCEDURE — 1090F PRES/ABSN URINE INCON ASSESS: CPT | Performed by: FAMILY MEDICINE

## 2023-02-07 PROCEDURE — 3074F SYST BP LT 130 MM HG: CPT | Performed by: FAMILY MEDICINE

## 2023-02-07 PROCEDURE — 99214 OFFICE O/P EST MOD 30 MIN: CPT | Performed by: FAMILY MEDICINE

## 2023-02-07 PROCEDURE — G8484 FLU IMMUNIZE NO ADMIN: HCPCS | Performed by: FAMILY MEDICINE

## 2023-02-07 PROCEDURE — 1036F TOBACCO NON-USER: CPT | Performed by: FAMILY MEDICINE

## 2023-02-07 PROCEDURE — G8427 DOCREV CUR MEDS BY ELIG CLIN: HCPCS | Performed by: FAMILY MEDICINE

## 2023-02-07 PROCEDURE — G8399 PT W/DXA RESULTS DOCUMENT: HCPCS | Performed by: FAMILY MEDICINE

## 2023-02-07 RX ORDER — DOXAZOSIN MESYLATE 4 MG/1
TABLET ORAL
Qty: 90 TABLET | Refills: 1 | Status: SHIPPED | OUTPATIENT
Start: 2023-02-07

## 2023-02-07 RX ORDER — EZETIMIBE 10 MG/1
10 TABLET ORAL DAILY
Qty: 90 TABLET | Refills: 0 | Status: SHIPPED | OUTPATIENT
Start: 2023-02-07

## 2023-02-07 RX ORDER — DULOXETIN HYDROCHLORIDE 60 MG/1
CAPSULE, DELAYED RELEASE ORAL
Qty: 90 CAPSULE | Refills: 0 | Status: SHIPPED | OUTPATIENT
Start: 2023-02-07

## 2023-02-07 RX ORDER — CARVEDILOL 25 MG/1
TABLET ORAL
Qty: 180 TABLET | Refills: 1 | Status: SHIPPED | OUTPATIENT
Start: 2023-02-07

## 2023-02-07 RX ORDER — FLUTICASONE PROPIONATE AND SALMETEROL 500; 50 UG/1; UG/1
1 POWDER RESPIRATORY (INHALATION) EVERY 12 HOURS
Qty: 3 EACH | Refills: 0 | Status: SHIPPED | OUTPATIENT
Start: 2023-02-07

## 2023-02-07 ASSESSMENT — ENCOUNTER SYMPTOMS
SHORTNESS OF BREATH: 0
ABDOMINAL PAIN: 0

## 2023-02-07 NOTE — PROGRESS NOTES
Subjective:      Patient ID: Gareth Martinez is a 65 y.o. female    Hypertension  Pertinent negatives include no shortness of breath. Hyperlipidemia  Pertinent negatives include no shortness of breath. DepressionPatient is not experiencing: shortness of breath. Here in follow up for htn and lipids and depression and asthma and blood work. Depression not under good control with current dosing of cymbalta. Weight down few pounds since last time. Still having some periodic wheezing although does not use advair regularly     Review of Systems   Constitutional:  Negative for unexpected weight change. Respiratory:  Negative for shortness of breath. Gastrointestinal:  Negative for abdominal pain. Skin:  Negative for rash. Neurological:  Negative for dizziness. Psychiatric/Behavioral:  Positive for depression. Reviewed allergy, medical, social, surgical, family and med list changes and updated   Files--reviewed blood work with pre diabetes and lipid elevation      Social History     Socioeconomic History    Marital status:    Tobacco Use    Smoking status: Never    Smokeless tobacco: Never   Substance and Sexual Activity    Alcohol use: No    Drug use: No   Social History Narrative    ** Merged History Encounter **          Social Determinants of Health     Financial Resource Strain: Low Risk     Difficulty of Paying Living Expenses: Not hard at all   Food Insecurity: No Food Insecurity    Worried About [facility] in the Last Year: Never true    Ran Out of Food in the Last Year: Never true   Transportation Needs: No Transportation Needs    Lack of Transportation (Medical): No    Lack of Transportation (Non-Medical):  No   Physical Activity: Inactive    Days of Exercise per Week: 0 days    Minutes of Exercise per Session: 10 min     Current Outpatient Medications   Medication Sig Dispense Refill    Handicap Placard MISC by Does not apply route Start 3-17-23 to 3-17-24 Dx  Chronic back pain 1 each 0    doxazosin (CARDURA) 4 MG tablet TAKE 1 TABLET NIGHTLY 90 tablet 0    sodium-potassium-mag sulfate (SUPREP) 17.5-3.13-1.6 GM/177ML SOLN solution As directed 354 mL 0    psyllium (METAMUCIL SMOOTH TEXTURE) 58.6 % powder Take 2 teaspoon with 8 to 10 oz water. 425 g 2    carvedilol (COREG) 25 MG tablet TAKE 1 TABLET TWICE DAILY  WITH MEALS 180 tablet 0    DULoxetine (CYMBALTA) 60 MG extended release capsule TAKE 1 CAPSULE DAILY 90 capsule 0    Handicap Placard MISC by Does not apply route Duration of 1 year  Patient is high risk for fall 1 each 0    fluticasone-salmeterol (ADVAIR DISKUS) 500-50 MCG/DOSE diskus inhaler USE 1 INHALATION ORALLY    EVERY 12 HOURS 3 Inhaler 1    Multiple Vitamins-Minerals (MULTIVITAMIN PO) Take by mouth daily       EPINEPHrine (EPIPEN 2-DE) 0.3 MG/0.3ML SOAJ injection Inject 0.3 mLs into the muscle once for 1 dose Use as directed for allergic reaction. Please give generic 0.3 mL 0     Current Facility-Administered Medications   Medication Dose Route Frequency Provider Last Rate Last Admin    betamethasone acetate-betamethasone sodium phosphate (CELESTONE) injection 6 mg  6 mg Intra-LESional Once Axel Hsieh MD         Family History   Problem Relation Age of Onset    Colon Cancer Mother     Cancer Mother         BREAST, COLON    Breast Cancer Mother     High Blood Pressure Father      Past Medical History:   Diagnosis Date    Hyperlipidemia     Hypertension     Stroke (cerebrum) ([facility] [address] 08/01/2015    TIA (transient ischemic attack)      Objective:   /78   Pulse 76   Temp 97.9 °F (36.6 °C)   Resp 14   Ht 5' 5\" (1.651 m)   Wt 226 lb 9.6 oz (102.8 kg)   LMP  (LMP Unknown)   SpO2 97%   BMI 37.71 kg/m²     Physical Exam  Neck:no carotid bruits. No masses. No adenopathy. No thyroid asymmetry. Lungs:clear and equal breath sounds. No wheezes or rales. Heart:rate reg. No murmur.   No gallops   Pulses:Radials 2+ equal               D.P  1+ equal  Extremities:no edema in either leg  Gen: In no acute distress  Abdomen; B.S present. Soft  Non tender. No hepatosplenomegaly. No masses  Patient with appropriate affect. Alert   Thought content appropriate  Good eye contact      Assessment:       Diagnosis Orders   1. Essential hypertension  doxazosin (CARDURA) 4 MG tablet    Lipid Panel    Comprehensive Metabolic Panel      2. Pure hypercholesterolemia  Lipid Panel    Comprehensive Metabolic Panel      3. Severe episode of recurrent major depressive disorder, without psychotic features ([facility] [address]  JERICHO - Odin Silverman Psychiatry, MD, [address] [place] [address]      4. Pre-diabetes        5. Moderate persistent asthma without complication        6. History of CVA (cerebrovascular accident)        7. Bilateral sacroiliitis ([address]        8. Paroxysmal A-fib (HCC)                Plan:    Depression not under good control-will do referral  Sacroiliitis-stable -followed by pain management  Paf-stable -not seeing cardiology recently-will monitor   Orders Placed This Encounter   Medications    carvedilol (COREG) 25 MG tablet     Sig: TAKE 1 TABLET TWICE DAILY  WITH MEALS     Dispense:  180 tablet     Refill:  1    doxazosin (CARDURA) 4 MG tablet     Sig: TAKE 1 TABLET NIGHTLY     Dispense:  90 tablet     Refill:  1    DULoxetine (CYMBALTA) 60 MG extended release capsule     Sig: TAKE 1 CAPSULE DAILY     Dispense:  90 capsule     Refill:  0    fluticasone-salmeterol (ADVAIR DISKUS) 500-50 MCG/ACT AEPB diskus inhaler     Sig: Inhale 1 puff into the lungs in the morning and 1 puff in the evening.      Dispense:  3 each     Refill:  0      Orders Placed This Encounter   Procedures    JERICHO Silverman Psychiatry, MD, [address] [place] [address]     Referral Priority:   Routine     Referral Type:   Eval and Treat     Referral Reason:   Specialty Services Required     Referred to Provider:   Milana Dandy     Requested Specialty:   Psychiatry Number of Visits Requested:   1    Fasting blood work in 3 months and f/u after done

## 2023-02-20 ENCOUNTER — ANESTHESIA (OUTPATIENT)
Dept: ENDOSCOPY | Age: 78
End: 2023-02-20
Payer: MEDICARE

## 2023-02-20 ENCOUNTER — HOSPITAL ENCOUNTER (OUTPATIENT)
Age: 78
Setting detail: OUTPATIENT SURGERY
Discharge: HOME | End: 2023-02-20
Attending: INTERNAL MEDICINE | Admitting: INTERNAL MEDICINE
Payer: MEDICARE

## 2023-02-20 ENCOUNTER — ANESTHESIA EVENT (OUTPATIENT)
Dept: ENDOSCOPY | Age: 78
End: 2023-02-20
Payer: MEDICARE

## 2023-02-20 VITALS
WEIGHT: 226 LBS | RESPIRATION RATE: 18 BRPM | DIASTOLIC BLOOD PRESSURE: 88 MMHG | SYSTOLIC BLOOD PRESSURE: 186 MMHG | HEIGHT: 65 IN | OXYGEN SATURATION: 94 % | BODY MASS INDEX: 37.65 KG/M2 | TEMPERATURE: 97.2 F | HEART RATE: 65 BPM

## 2023-02-20 DIAGNOSIS — Z80.0 FAMILY HISTORY OF COLON CANCER IN MOTHER: ICD-10-CM

## 2023-02-20 PROBLEM — K63.5 POLYP OF COLON: Status: ACTIVE | Noted: 2023-02-20

## 2023-02-20 PROCEDURE — 45385 COLONOSCOPY W/LESION REMOVAL: CPT | Performed by: INTERNAL MEDICINE

## 2023-02-20 PROCEDURE — 88305 TISSUE EXAM BY PATHOLOGIST: CPT

## 2023-02-20 PROCEDURE — 3700000000 HC ANESTHESIA ATTENDED CARE: Performed by: INTERNAL MEDICINE

## 2023-02-20 PROCEDURE — 6360000002 HC RX W HCPCS: Performed by: NURSE ANESTHETIST, CERTIFIED REGISTERED

## 2023-02-20 PROCEDURE — 2580000003 HC RX 258

## 2023-02-20 PROCEDURE — 7100000010 HC PHASE II RECOVERY - FIRST 15 MIN: Performed by: INTERNAL MEDICINE

## 2023-02-20 PROCEDURE — 3609027000 HC COLONOSCOPY: Performed by: INTERNAL MEDICINE

## 2023-02-20 PROCEDURE — 3700000001 HC ADD 15 MINUTES (ANESTHESIA): Performed by: INTERNAL MEDICINE

## 2023-02-20 PROCEDURE — 6370000000 HC RX 637 (ALT 250 FOR IP): Performed by: INTERNAL MEDICINE

## 2023-02-20 PROCEDURE — 2709999900 HC NON-CHARGEABLE SUPPLY: Performed by: INTERNAL MEDICINE

## 2023-02-20 PROCEDURE — 2580000003 HC RX 258: Performed by: INTERNAL MEDICINE

## 2023-02-20 PROCEDURE — 45380 COLONOSCOPY AND BIOPSY: CPT | Performed by: INTERNAL MEDICINE

## 2023-02-20 RX ORDER — SODIUM CHLORIDE 9 MG/ML
INJECTION, SOLUTION INTRAVENOUS
Status: COMPLETED
Start: 2023-02-20 | End: 2023-02-20

## 2023-02-20 RX ORDER — SODIUM CHLORIDE 9 MG/ML
INJECTION, SOLUTION INTRAVENOUS CONTINUOUS
Status: DISCONTINUED | OUTPATIENT
Start: 2023-02-20 | End: 2023-02-20 | Stop reason: HOSPADM

## 2023-02-20 RX ORDER — MAGNESIUM HYDROXIDE 1200 MG/15ML
LIQUID ORAL PRN
Status: DISCONTINUED | OUTPATIENT
Start: 2023-02-20 | End: 2023-02-20 | Stop reason: ALTCHOICE

## 2023-02-20 RX ORDER — SIMETHICONE 20 MG/.3ML
EMULSION ORAL PRN
Status: DISCONTINUED | OUTPATIENT
Start: 2023-02-20 | End: 2023-02-20 | Stop reason: ALTCHOICE

## 2023-02-20 RX ORDER — PROPOFOL 10 MG/ML
INJECTION, EMULSION INTRAVENOUS PRN
Status: DISCONTINUED | OUTPATIENT
Start: 2023-02-20 | End: 2023-02-20 | Stop reason: SDUPTHER

## 2023-02-20 RX ADMIN — PROPOFOL 400 MG: 10 INJECTION, EMULSION INTRAVENOUS at 08:06

## 2023-02-20 RX ADMIN — SODIUM CHLORIDE: 9 INJECTION, SOLUTION INTRAVENOUS at 08:02

## 2023-02-20 ASSESSMENT — PAIN - FUNCTIONAL ASSESSMENT: PAIN_FUNCTIONAL_ASSESSMENT: 0-10

## 2023-02-20 NOTE — ANESTHESIA PRE PROCEDURE
Department of Anesthesiology  Preprocedure Note       Name:  Lilliam Downing   Age:  65 y.o.  :  1945                                          MRN:  [id number]         Date:  2023      Surgeon: Valdez Bond):  Franklyn Begum MD    Procedure: Procedure(s):  COLORECTAL CANCER SCREENING, HIGH RISK    Medications prior to admission:   Prior to Admission medications    Medication Sig Start Date End Date Taking? Authorizing Provider   carvedilol (COREG) 25 MG tablet TAKE 1 TABLET TWICE DAILY  WITH MEALS 23   Libertad Pain, MD   doxazosin (CARDURA) 4 MG tablet TAKE 1 TABLET NIGHTLY 23   Libertad Pain, MD   DULoxetine (CYMBALTA) 60 MG extended release capsule TAKE 1 CAPSULE DAILY 23   Libertad Lock, MD   fluticasone-salmeterol (ADVAIR DISKUS) 500-50 MCG/ACT AEPB diskus inhaler Inhale 1 puff into the lungs in the morning and 1 puff in the evening. 23   Libertad Lock, MD   ezetimibe (ZETIA) 10 MG tablet Take 1 tablet by mouth daily 23   Libertad Lock, MD Laila Bourgeois MISC by Does not apply route Start 3-17-23 to 3-17-24 Dx  Chronic back pain 23   Libertad Lock MD   sodium-potassium-mag sulfate (SUPREP) 17.5-3.13-1.6 GM/177ML SOLN solution As directed 1/3/23   Franklyn Begum MD   psyllium (METAMUCIL SMOOTH TEXTURE) 58.6 % powder Take 2 teaspoon with 8 to 10 oz water. 1/3/23   MD Laila De La Torre MISC by Does not apply route Duration of 1 year  Patient is high risk for fall 22   Libertad Lock, MD   fluticasone-salmeterol (ADVAIR DISKUS) 500-50 MCG/DOSE diskus inhaler USE 1 INHALATION ORALLY    EVERY 12 HOURS 21   Libertad Lock MD   EPINEPHrine (EPIPEN 2-DE) 0.3 MG/0.3ML SOAJ injection Inject 0.3 mLs into the muscle once for 1 dose Use as directed for allergic reaction.  Please give generic 6/3/19 12/15/21  Arben Morfin MD   Multiple Vitamins-Minerals (MULTIVITAMIN PO) Take by mouth daily     Historical Provider, MD       Current medications: Current Facility-Administered Medications   Medication Dose Route Frequency Provider Last Rate Last Admin    sterile water for irrigation    PRN oGrdo Simental MD   1,000 mL at 02/20/23 0721    sodium chloride 0.9 % infusion                Allergies: Allergies   Allergen Reactions    Floxin [Ofloxacin] Anaphylaxis    Praluent [Alirocumab] Anaphylaxis    Questran [Cholestyramine] Anaphylaxis and Swelling    Lidocaine Oint & Men-Meth Jemal Swelling    Lovaza [Omega-3-Acid Ethyl Esters]     Niacin And Related     Statins     Tobramycin      rash    Tricor [Fenofibrate]        Problem List:    Patient Active Problem List   Diagnosis Code    Hypertension I10    Hyperlipidemia E78.5    RAD (reactive airway disease) J45.909    Spondylosis of lumbosacral joint without myelopathy M47.817    Sacroiliac inflammation (HCC) M46.1    Morbidly obese (HCC) E66.01    Moderate persistent asthma without complication P47.66    Paroxysmal A-fib (HCC) I48.0    Chronic pain of right knee M25.561, G89.29    Chronic pain syndrome G89.4    Status post total right knee replacement Z96.651    Primary osteoarthritis of right knee M17.11    Severe episode of recurrent major depressive disorder, without psychotic features ([facility] [address] F33.2       Past Medical History:        Diagnosis Date    Hyperlipidemia     Hypertension     Stroke (cerebrum) ([facility] [address] 08/01/2015    TIA (transient ischemic attack)        Past Surgical History:        Procedure Laterality Date    APPENDECTOMY      BREAST BIOPSY Right     benign    CATARACT REMOVAL      HAND SURGERY      RIGHT    HERNIA REPAIR Left 5/23/2019    primary repairs  and [facility]    TONSILLECTOMY AND ADENOIDECTOMY      UMBILICAL HERNIA REPAIR N/A 5/23/2019    Primary repair        Social History:    Social History     Tobacco Use    Smoking status: Never    Smokeless tobacco: Never   Substance Use Topics    Alcohol use:  No                                Counseling given: Not Answered      Vital Signs (Current): There were no vitals filed for this visit. BP Readings from Last 3 Encounters:   02/07/23 120/78   11/15/22 130/82   10/13/22 124/76       NPO Status:                                                                                 BMI:   Wt Readings from Last 3 Encounters:   02/07/23 226 lb 9.6 oz (102.8 kg)   01/03/23 227 lb (103 kg)   11/15/22 227 lb (103 kg)     There is no height or weight on file to calculate BMI.    CBC:   Lab Results   Component Value Date/Time    WBC 8.6 02/06/2023 12:45 PM    RBC 4.71 02/06/2023 12:45 PM    RBC 4.42 05/24/2018 10:03 AM    HGB 14.1 02/06/2023 12:45 PM    HCT 42.9 02/06/2023 12:45 PM    MCV 91.2 02/06/2023 12:45 PM    RDW 13.6 02/06/2023 12:45 PM     02/06/2023 12:45 PM       CMP:   Lab Results   Component Value Date/Time     02/06/2023 12:45 PM    K 4.3 02/06/2023 12:45 PM     02/06/2023 12:45 PM    CO2 27 02/06/2023 12:45 PM    BUN 12 02/06/2023 12:45 PM    CREATININE 0.82 02/06/2023 12:45 PM    GFRAA >60.0 08/23/2021 09:39 AM    AGRATIO 1.3 05/24/2018 10:03 AM    LABGLOM >60.0 02/06/2023 12:45 PM    GLUCOSE 94 08/23/2021 09:39 AM    GLUCOSE 107 05/24/2018 10:03 AM    PROT 7.3 02/06/2023 12:45 PM    CALCIUM 9.5 02/06/2023 12:45 PM    BILITOT 0.5 02/06/2023 12:45 PM    ALKPHOS 86 02/06/2023 12:45 PM    AST 22 02/06/2023 12:45 PM    ALT 12 02/06/2023 12:45 PM       POC Tests: No results for input(s): POCGLU, POCNA, POCK, POCCL, POCBUN, POCHEMO, POCHCT in the last 72 hours.     Coags:   Lab Results   Component Value Date/Time    PROTIME 9.9 08/04/2015 06:02 PM    PROTIME 10.1 11/22/2011 12:22 PM    INR 1.0 08/04/2015 06:02 PM    APTT 33.9 08/04/2015 06:02 PM       HCG (If Applicable): No results found for: PREGTESTUR, PREGSERUM, HCG, HCGQUANT     ABGs: No results found for: PHART, PO2ART, DQD8TKD, CYY1NGN, BEART, N2QPISRK     Type & Screen (If Applicable):  No results found for: [facility]    Drug/Infectious Status (If Applicable):  No results found for: HIV, HEPCAB    COVID-19 Screening (If Applicable):   Lab Results   Component Value Date/Time    COVID19 Not Detected 11/29/2020 10:04 AM           Anesthesia Evaluation  Patient summary reviewed history of anesthetic complications:   Airway: Mallampati: III  TM distance: >3 FB   Neck ROM: full  Mouth opening: > = 3 FB   Dental:          Pulmonary:   (+) asthma:                            Cardiovascular:    (+) hypertension:, hyperlipidemia                  Neuro/Psych:   (+) CVA:, TIA, depression/anxiety             GI/Hepatic/Renal:   (+) bowel prep, morbid obesity          Endo/Other: Negative Endo/Other ROS                    Abdominal:   (+) obese,           Vascular: Other Findings:           Anesthesia Plan      MAC     ASA 3             Anesthetic plan and risks discussed with patient.                         RUMA De Jesus - CRNA   2/20/2023

## 2023-02-23 ENCOUNTER — PATIENT MESSAGE (OUTPATIENT)
Dept: GASTROENTEROLOGY | Age: 78
End: 2023-02-23

## 2023-02-23 DIAGNOSIS — Z86.010 HISTORY OF COLON POLYPS: Primary | ICD-10-CM

## 2023-02-24 NOTE — H&P
Patient Name: Yisel Dai  : 1945  MRN: [id number]  DATE: 23      ENDOSCOPY  History and Physical    Procedure:    [x] Diagnostic Colonoscopy       [] Screening Colonoscopy  [] EGD      [] ERCP      [] EUS       [] Other    [x] Previous office notes/History and Physical reviewed from the patients chart. Please see EMR for further details of HPI. I have examined the patient's status immediately prior to the procedure and:      Indications/HPI:    []Abdominal Pain   []Cancer- GI/Lung  []Fhx of colon CA/polyps  []History of Polyps   []Graff’s   []Melena  []Abnormal Imaging   []Dysphagia    []Persistent Pneumonia  []Anemia   []Food Impaction  [x]History of Polyps  []GI Bleed   []Pulmonary nodule/Mass  []Change in bowel habits  []Heartburn/Reflux  []Rectal Bleed (BRBPR)  []Chest Pain - Non Cardiac  []Heme (+) Stool  []Ulcers  []Constipation   []Hemoptysis   []Varices  []Diarrhea   []Hypoxemia  []Nausea/Vomiting   []Screening   []Crohns/Colitis  []Other:    Anesthesia:   [x] MAC [] Moderate Sedation   [] General   [] None     ROS: 12 pt Review of Symptoms was negative unless mentioned above    Medications:   Prior to Admission medications    Medication Sig Start Date End Date Taking? Authorizing Provider   carvedilol (COREG) 25 MG tablet TAKE 1 TABLET TWICE DAILY  WITH MEALS 23   Yadiel Miramontes MD   doxazosin (CARDURA) 4 MG tablet TAKE 1 TABLET NIGHTLY 23   Yadiel Miramontes MD   DULoxetine (CYMBALTA) 60 MG extended release capsule TAKE 1 CAPSULE DAILY 23   Yadiel Miramontes MD   fluticasone-salmeterol (ADVAIR DISKUS) 500-50 MCG/ACT AEPB diskus inhaler Inhale 1 puff into the lungs in the morning and 1 puff in the evening. 23   Yadiel Miramontes MD   ezetimibe (ZETIA) 10 MG tablet Take 1 tablet by mouth daily 23   Yadiel Miramontes MD   Handicap Placard MISC by Does not apply route Start 3-17-23 to 3-17-24 Dx  Chronic back pain 23   Yadiel Miramontes MD   sodium-potassium-mag  sulfate (SUPREP) 17.5-3.13-1.6 GM/177ML SOLN solution As directed 1/3/23   Marcella Tanner MD   psyllium (METAMUCIL SMOOTH TEXTURE) 58.6 % powder Take 2 teaspoon with 8 to 10 oz water. 1/3/23   Marcella Tanner MD   Handicap Placard MISC by Does not apply route Duration of 1 year  Patient is high risk for fall 1/18/22   Pippa Barbosa MD   fluticasone-salmeterol (ADVAIR DISKUS) 500-50 MCG/DOSE diskus inhaler USE 1 INHALATION ORALLY    EVERY 12 HOURS 8/26/21   Pippa Barbosa MD   EPINEPHrine (EPIPEN 2-DE) 0.3 MG/0.3ML SOAJ injection Inject 0.3 mLs into the muscle once for 1 dose Use as directed for allergic reaction. Please give generic 6/3/19 12/15/21  Regina Mesa MD   Multiple Vitamins-Minerals (MULTIVITAMIN PO) Take by mouth daily     Historical Provider, MD       Allergies:    Allergies   Allergen Reactions    Floxin [Ofloxacin] Anaphylaxis    Praluent [Alirocumab] Anaphylaxis    Questran [Cholestyramine] Anaphylaxis and Swelling    Lidocaine Oint & Men-Meth Jemal Swelling    Lovaza [Omega-3-Acid Ethyl Esters]     Niacin And Related     Statins     Tobramycin      rash    Tricor [Fenofibrate]         History of allergic reaction to anesthesia:  No    Past Medical History:  Past Medical History:   Diagnosis Date    Asthma     Hyperlipidemia     Hypertension     Stroke (cerebrum) ([facility] [address] 08/01/2015    TIA (transient ischemic attack)        Past Surgical History:  Past Surgical History:   Procedure Laterality Date    APPENDECTOMY      BREAST BIOPSY Right     benign    CATARACT REMOVAL      COLONOSCOPY N/A 2/20/2023    COLONOSCOPY WITH POLYPECTOMY performed by Arodlo Upton MD at [address] [place] [address] Left 05/23/2019    primary repairs  and [facility] Bilateral     TONSILLECTOMY AND ADENOIDECTOMY      UMBILICAL HERNIA REPAIR N/A 05/23/2019    Primary repair        Social History:  Social History     Tobacco Use    Smoking status: Never Smokeless tobacco: Never   Substance Use Topics    Alcohol use: No    Drug use: No       Vital Signs:   Vitals:    02/20/23 0855   BP: (!) 186/88   Pulse: 65   Resp: 18   Temp:    SpO2: 94%        Physical Exam:  Cardiac:  [x]WNL  []Comments:  Pulmonary:  [x]WNL   []Comments:   Neuro/Mental Status:  [x]WNL  []Comments:  Abdominal:  [x]WNL    []Comments:  Other:   []WNL  []Comments:    Informed Consent:  The risks and benefits of the procedure have been discussed with either the patient or if they cannot consent, their representative. Assessment:  Patient examined and appropriate for planned sedation and procedure. Plan:  Proceed with planned sedation and procedure as above.     Daxa Moralez MD  12:57 PM

## 2023-02-27 ENCOUNTER — TELEPHONE (OUTPATIENT)
Dept: GASTROENTEROLOGY | Age: 78
End: 2023-02-27

## 2023-02-27 NOTE — TELEPHONE ENCOUNTER
Vanna, I sent patient a [employer] message. If not read by tomorrow, will you please call patient and let her know results of colonoscopy? It is all self-explanatory and in the [employer] message. Thank you.

## 2023-02-27 NOTE — TELEPHONE ENCOUNTER
The patient is scheduled for a follow up visit on 3/1/23, but would like a call today with her path results

## 2023-03-01 ENCOUNTER — OFFICE VISIT (OUTPATIENT)
Dept: GASTROENTEROLOGY | Age: 78
End: 2023-03-01
Payer: MEDICARE

## 2023-03-01 VITALS — BODY MASS INDEX: 37.99 KG/M2 | WEIGHT: 228 LBS | HEIGHT: 65 IN | OXYGEN SATURATION: 99 % | HEART RATE: 62 BPM

## 2023-03-01 DIAGNOSIS — E66.01 SEVERE OBESITY (BMI 35.0-39.9) WITH COMORBIDITY (HCC): ICD-10-CM

## 2023-03-01 DIAGNOSIS — Z86.010 HISTORY OF COLON POLYPS: ICD-10-CM

## 2023-03-01 DIAGNOSIS — R19.8 ALTERNATING CONSTIPATION AND DIARRHEA: ICD-10-CM

## 2023-03-01 DIAGNOSIS — Z80.0 FAMILY HISTORY OF COLON CANCER IN MOTHER: Primary | ICD-10-CM

## 2023-03-01 PROCEDURE — 1090F PRES/ABSN URINE INCON ASSESS: CPT | Performed by: NURSE PRACTITIONER

## 2023-03-01 PROCEDURE — 99213 OFFICE O/P EST LOW 20 MIN: CPT | Performed by: NURSE PRACTITIONER

## 2023-03-01 PROCEDURE — G8484 FLU IMMUNIZE NO ADMIN: HCPCS | Performed by: NURSE PRACTITIONER

## 2023-03-01 PROCEDURE — 1123F ACP DISCUSS/DSCN MKR DOCD: CPT | Performed by: NURSE PRACTITIONER

## 2023-03-01 PROCEDURE — 1036F TOBACCO NON-USER: CPT | Performed by: NURSE PRACTITIONER

## 2023-03-01 PROCEDURE — G8427 DOCREV CUR MEDS BY ELIG CLIN: HCPCS | Performed by: NURSE PRACTITIONER

## 2023-03-01 PROCEDURE — G8399 PT W/DXA RESULTS DOCUMENT: HCPCS | Performed by: NURSE PRACTITIONER

## 2023-03-01 PROCEDURE — G8417 CALC BMI ABV UP PARAM F/U: HCPCS | Performed by: NURSE PRACTITIONER

## 2023-03-01 NOTE — PROGRESS NOTES
Gastroenterology Clinic Follow up Visit    Ronan Strickland  [id number]  Chief Complaint   Patient presents with    Follow-up     HPI: 65 y.o. female following up after last GI clinic on 1/3/2023. S/p colonoscopy on 2/20/2023. Interval change: Patient reports doing well since her procedure. States she was taking MiraLAX daily prior to her colonoscopy, however stopped this as this was causing her to have diarrheal symptoms. Currently, she is back to having hard stools associated with straining. She denies GERD symptoms, nausea/vomiting, hematemesis, dysphagia, abdominal pain, hematochezia, melena or weight loss. HPI from last GI clinic visit on 1/3/2023  summarized below:  65 y.o. female presents to the clinic with longstanding symptoms of alternating diarrhea and constipation, she reports having symptoms for many years. She additionally reports GI issues in many of her family members including history of colon cancer in her mother in her [de-identified]. Patient s/p colonoscopy 7 years ago with findings of polyps. She reports symptoms of abdominal bloating, history of diverticulosis. With regards to her bowel movements she has small hard stools on medication. She has intermittent loose bowel movements. But mostly tendency towards constipation she denies any blood in the stool. She has had significant weight gain over the last few years, reports 20 pounds of weight gain over the last 4 years. Previous GI work up/Endoscopic investigations:   Colonoscopy 2/20/23: One 15 mm polyp in the ascending colon, removed with a hot snare. Resected and retrieved. Clip was placed. Three 3 to 5 mm polyps in the ascending colon, removed with a cold biopsy forceps. Resected and retrieved. One 5 mm polyp in the transverse colon, removed with a cold biopsy forceps. Resected and retrieved. Diverticulosis in the sigmoid colon, in the transverse colon and in the ascending colon. Severe diverticulosis in the sigmoid colon. Susan-diverticular erythema was seen. There was narrowing of the colon in association with the  diverticular opening. External hemorrhoids. A.  ASCENDING POLYPS: TUBULAR ADENOMAS   B.  TRANSVERSE POLYP: COLONIC MUCOSA WITH SMALL FOCUS OF ADENOMATOUS CHANGE     surveillance colonoscopy in 6 to 12 months given suboptimal prep    Colonoscopy 7 years ago with polyps    Review of Systems   All other systems reviewed and are negative. Past medical history, past surgical history, medication list, social and familyhistory reviewed    Pulse 62, height 5' 5\" (1.651 m), weight 228 lb (103.4 kg), SpO2 99 %, not currently breastfeeding. Physical Exam  Constitutional:       General: She is not in acute distress. Appearance: Normal appearance. She is normal weight. She is not ill-appearing. HENT:      Head: Normocephalic and atraumatic. Eyes:      General: No scleral icterus. Cardiovascular:      Rate and Rhythm: Normal rate and regular rhythm. Pulses: Normal pulses. Pulmonary:      Effort: Pulmonary effort is normal. No respiratory distress. Breath sounds: Normal breath sounds. Abdominal:      General: Bowel sounds are normal. There is no distension. Palpations: Abdomen is soft. There is no mass. Tenderness: There is no abdominal tenderness. There is no guarding or rebound. Comments: Central obesity   Musculoskeletal:         General: Normal range of motion. Lymphadenopathy:      Cervical: No cervical adenopathy. Skin:     General: Skin is warm and dry. Coloration: Skin is not jaundiced. Neurological:      Mental Status: She is alert and oriented to person, place, and time. Psychiatric:         Mood and Affect: Mood normal.         Behavior: Behavior normal.         Thought Content:  Thought content normal.         Judgment: Judgment normal.     Laboratory, Pathology, Radiology reviewed in detail with relevantimportant investigations summarized below:    Lab Results Component Value Date    WBC 8.6 02/06/2023    HGB 14.1 02/06/2023    HCT 42.9 02/06/2023    MCV 91.2 02/06/2023     02/06/2023     Lab Results   Component Value Date    ALT 12 02/06/2023    AST 22 02/06/2023    ALKPHOS 86 02/06/2023    BILITOT 0.5 02/06/2023     CT OF THE ABDOMEN AND PELVIS WITH CONTRAST 11/15/2022 2:34 pm     TECHNIQUE:  CT of the abdomen and pelvis was performed with the administration of  intravenous contrast. Multiplanar reformatted images are provided for review. Automated exposure control, iterative reconstruction, and/or weight based  adjustment of the mA/kV was utilized to reduce the radiation dose to as low  as reasonably achievable. COMPARISON:  None. HISTORY:  ORDERING SYSTEM PROVIDED HISTORY: Abdominal pain, left lower quadrant, Change  in bowel habits  TECHNOLOGIST PROVIDED HISTORY:  Additional Contrast?->Radiologist Recommendation  STAT Creatinine as needed:->Yes  What reading provider will be dictating this exam?->CRC     FINDINGS:  Lower Chest: No infiltrates or effusion. Minimal scarring at the lung bases. Organs: There is 2.1 cm cyst in the right hepatic lobe. Gallbladder is  present with no calcified stones. Spleen is not enlarged. Pancreas and  adrenal glands appear unremarkable. There is symmetric enhancement of the  kidneys with no hydronephrosis or perinephric infiltration. GI/Bowel: There are no obstructing or constricting lesions. Extensive  colonic diverticulosis with no evidence of diverticulitis. Pelvis: Bladder is decompressed. The uterus is present and atrophic. There  is no significant free fluid. Peritoneum/Retroperitoneum: No free air or significant adenopathy. Bones/Soft Tissues: Unremarkable. Impression  No acute process in the abdomen and pelvis. Colonic diverticulosis with no evidence of diverticulitis. Constipation.     Assessment and Plan:  Gay Crandall 65 y.o. female with clinical history consistent with IBS with alternating diarrhea and constipation versus chronic idiopathic constipation. Additionally with family history of colon cancer in mother and the GI issues in many family members. Recent colonoscopy with multiple polyps (tubular adenomas) removed along with pandiverticulosis with severe diverticulosis in the sigmoid. Poor prep noted at that time. 1. Family history of colon cancer in mother  2. History of colon polyps  -Repeat Colonoscopy is indicated in 6-12 months, patient will require extended prep at that time. Recommend MiraLAX Gatorade cleanse 2 days prior to procedure in addition to split prep day before procedure. Will address at follow-up. 3. Alternating constipation and diarrhea  - Lifestyle modification  and Dietary changes discussed  - Fiber supplementation to promote regular bowel movements, issue with fiber increasing gas and bloating discussed  -Restart MiraLAX 17 g by mouth daily, titrate up or down as needed to produce a formed bowel movement daily.  -Patient provided with Linzess 72 mcg samples to be taken by mouth once daily, if effective, instructed patient to call the office to request prescription be sent to her pharmacy. She is agreeable and reports understanding. 4. Severe obesity (BMI 35.0-39. 9) with comorbidity ([address]  -Dietary changes, calorie restriction    Return in about 8 months (around 11/1/2023). RUMA Colón - [facility]   Staff Gastroenterology Nurse Practitioner  [facility]    Please note this report has been partially produced using speech recognition software and contain errors related to that system including grammar, punctuation and spelling as well as words and phrases that may seem inappropriate. If there are questions or concerns please feel free to contact me to clarify.

## 2023-04-19 RX ORDER — FLUTICASONE PROPIONATE AND SALMETEROL 50; 500 UG/1; UG/1
POWDER RESPIRATORY (INHALATION)
Qty: 3 EACH | Refills: 0 | Status: SHIPPED | OUTPATIENT
Start: 2023-04-19

## 2023-04-19 RX ORDER — EZETIMIBE 10 MG/1
TABLET ORAL
Qty: 90 TABLET | Refills: 0 | Status: SHIPPED | OUTPATIENT
Start: 2023-04-19

## 2023-05-03 ENCOUNTER — TELEPHONE (OUTPATIENT)
Dept: SPORTS MEDICINE | Age: 78
End: 2023-05-03

## 2023-05-03 NOTE — TELEPHONE ENCOUNTER
Patient asks if Dr. Lili Melendez would be able to do si joint injections at [facility] on 5/5/23?     Last received 11/3/22

## 2023-05-05 ENCOUNTER — PROCEDURE VISIT (OUTPATIENT)
Dept: SPORTS MEDICINE | Age: 78
End: 2023-05-05

## 2023-05-05 DIAGNOSIS — S33.6XXA SACROILIAC (LIGAMENT) SPRAIN, INITIAL ENCOUNTER: Primary | ICD-10-CM

## 2023-05-05 RX ORDER — LIDOCAINE HYDROCHLORIDE 10 MG/ML
4 INJECTION, SOLUTION INFILTRATION; PERINEURAL ONCE
Status: COMPLETED | OUTPATIENT
Start: 2023-05-05 | End: 2023-05-05

## 2023-05-05 RX ORDER — BETAMETHASONE SODIUM PHOSPHATE AND BETAMETHASONE ACETATE 3; 3 MG/ML; MG/ML
6 INJECTION, SUSPENSION INTRA-ARTICULAR; INTRALESIONAL; INTRAMUSCULAR; SOFT TISSUE ONCE
Status: COMPLETED | OUTPATIENT
Start: 2023-05-05 | End: 2023-05-05

## 2023-05-05 RX ADMIN — BETAMETHASONE SODIUM PHOSPHATE AND BETAMETHASONE ACETATE 6 MG: 3; 3 INJECTION, SUSPENSION INTRA-ARTICULAR; INTRALESIONAL; INTRAMUSCULAR; SOFT TISSUE at 11:27

## 2023-05-05 RX ADMIN — LIDOCAINE HYDROCHLORIDE 4 ML: 10 INJECTION, SOLUTION INFILTRATION; PERINEURAL at 11:27

## 2023-05-05 NOTE — PROGRESS NOTES
US GUIDED SACROILIAC LIGAMENT INJECTION      After informed consent was provided and allergies verified, the patient was positioned appropriately. The SI ligament was prepped and draped with betadine to provide sterile environment. After prepping and draping the Bilateral SI ligaments in the usual sterile fashion, 1 cc of Celestone with 2cc of lidocaine were injected via an ultrasound-guided spinal needle into the bilateral SI ligaments without any complications. Patient tolerated this well. Before aspiration/injection risks/benefits of a cortisone injection including infection, local skin irritation, skin atrophy, calcification, continued pain/discomfort, elevated blood sugar, burning, failure to relieve pain, possible late infection were discussed with patient. Post-procedure discomfort can be alleviated with additional medications/ice/elevation/rest over the first 24 hours as recommended. Patient verbalized understanding and wanted to proceed with planned procedure.     If fluid was aspirated it was sent for further studies  in sterile specimen container as ordered to the lab     Ultrasound images  of the procedure were saved

## 2023-05-12 ENCOUNTER — OFFICE VISIT (OUTPATIENT)
Dept: FAMILY MEDICINE CLINIC | Age: 78
End: 2023-05-12

## 2023-05-12 VITALS
OXYGEN SATURATION: 98 % | HEIGHT: 65 IN | BODY MASS INDEX: 38.32 KG/M2 | RESPIRATION RATE: 14 BRPM | TEMPERATURE: 97.2 F | WEIGHT: 230 LBS | HEART RATE: 68 BPM | DIASTOLIC BLOOD PRESSURE: 70 MMHG | SYSTOLIC BLOOD PRESSURE: 120 MMHG

## 2023-05-12 DIAGNOSIS — E78.00 PURE HYPERCHOLESTEROLEMIA: ICD-10-CM

## 2023-05-12 DIAGNOSIS — R73.03 PRE-DIABETES: Primary | ICD-10-CM

## 2023-05-12 SDOH — ECONOMIC STABILITY: INCOME INSECURITY: HOW HARD IS IT FOR YOU TO PAY FOR THE VERY BASICS LIKE FOOD, HOUSING, MEDICAL CARE, AND HEATING?: NOT HARD AT ALL

## 2023-05-12 SDOH — ECONOMIC STABILITY: FOOD INSECURITY: WITHIN THE PAST 12 MONTHS, YOU WORRIED THAT YOUR FOOD WOULD RUN OUT BEFORE YOU GOT MONEY TO BUY MORE.: NEVER TRUE

## 2023-05-12 SDOH — ECONOMIC STABILITY: FOOD INSECURITY: WITHIN THE PAST 12 MONTHS, THE FOOD YOU BOUGHT JUST DIDN'T LAST AND YOU DIDN'T HAVE MONEY TO GET MORE.: NEVER TRUE

## 2023-05-12 SDOH — ECONOMIC STABILITY: HOUSING INSECURITY
IN THE LAST 12 MONTHS, WAS THERE A TIME WHEN YOU DID NOT HAVE A STEADY PLACE TO SLEEP OR SLEPT IN A SHELTER (INCLUDING NOW)?: NO

## 2023-05-12 ASSESSMENT — PATIENT HEALTH QUESTIONNAIRE - PHQ9
SUM OF ALL RESPONSES TO PHQ9 QUESTIONS 1 & 2: 0
10. IF YOU CHECKED OFF ANY PROBLEMS, HOW DIFFICULT HAVE THESE PROBLEMS MADE IT FOR YOU TO DO YOUR WORK, TAKE CARE OF THINGS AT HOME, OR GET ALONG WITH OTHER PEOPLE: 0
4. FEELING TIRED OR HAVING LITTLE ENERGY: 0
3. TROUBLE FALLING OR STAYING ASLEEP: 0
SUM OF ALL RESPONSES TO PHQ QUESTIONS 1-9: 0
9. THOUGHTS THAT YOU WOULD BE BETTER OFF DEAD, OR OF HURTING YOURSELF: 0
5. POOR APPETITE OR OVEREATING: 0
7. TROUBLE CONCENTRATING ON THINGS, SUCH AS READING THE NEWSPAPER OR WATCHING TELEVISION: 0
6. FEELING BAD ABOUT YOURSELF - OR THAT YOU ARE A FAILURE OR HAVE LET YOURSELF OR YOUR FAMILY DOWN: 0
SUM OF ALL RESPONSES TO PHQ QUESTIONS 1-9: 0
2. FEELING DOWN, DEPRESSED OR HOPELESS: 0
1. LITTLE INTEREST OR PLEASURE IN DOING THINGS: 0
SUM OF ALL RESPONSES TO PHQ QUESTIONS 1-9: 0
SUM OF ALL RESPONSES TO PHQ QUESTIONS 1-9: 0
8. MOVING OR SPEAKING SO SLOWLY THAT OTHER PEOPLE COULD HAVE NOTICED. OR THE OPPOSITE, BEING SO FIGETY OR RESTLESS THAT YOU HAVE BEEN MOVING AROUND A LOT MORE THAN USUAL: 0

## 2023-05-12 ASSESSMENT — ENCOUNTER SYMPTOMS: BACK PAIN: 1

## 2023-05-12 NOTE — PROGRESS NOTES
Subjective:      Patient ID: Maida Goldberg is a 77 y.o. female    HPI  Here in follow up from recent blood work. Tolerating zetia well from last time. No missed doses. Has been doing much better from depression standpoint-did not see psychiatry but getting better interaction with friends    Review of Systems   Constitutional:  Negative for fever. Musculoskeletal:  Positive for back pain. Skin:  Negative for rash. Neurological:  Negative for weakness. Reviewed allergy, medical, social, surgical, family and med list changes and updated   Files--reviewed blood work with elevated lipids and pre diabetes     Social History     Socioeconomic History    Marital status:    Tobacco Use    Smoking status: Never    Smokeless tobacco: Never   Substance and Sexual Activity    Alcohol use: No    Drug use: No   Social History Narrative    ** Merged History Encounter **          Social Determinants of Health     Financial Resource Strain: Low Risk     Difficulty of Paying Living Expenses: Not hard at all   Food Insecurity: No Food Insecurity    Worried About [address] in the Last Year: Never true    [address] [employer] in the Last Year: Never true   Transportation Needs: No Transportation Needs    Lack of Transportation (Medical): No    Lack of Transportation (Non-Medical):  No   Physical Activity: Inactive    Days of Exercise per Week: 0 days    Minutes of Exercise per Session: 10 min     Current Outpatient Medications   Medication Sig Dispense Refill    ezetimibe (ZETIA) 10 MG tablet TAKE 1 TABLET DAILY 90 tablet 0    ADVAIR DISKUS 500-50 MCG/ACT AEPB diskus inhaler USE 1 INHALATION ORALLY IN THE MORNING AND IN THE     EVENING 3 each 0    linaCLOtide (LINZESS) 72 MCG CAPS capsule Take 1 capsule by mouth every morning (before breakfast) 8 capsule 0    carvedilol (COREG) 25 MG tablet TAKE 1 TABLET TWICE DAILY  WITH MEALS 180 tablet 1    doxazosin (CARDURA) 4 MG tablet TAKE 1 TABLET NIGHTLY 90 tablet 1

## 2023-05-26 ENCOUNTER — OFFICE VISIT (OUTPATIENT)
Dept: SPORTS MEDICINE | Age: 78
End: 2023-05-26
Payer: MEDICARE

## 2023-05-26 VITALS
DIASTOLIC BLOOD PRESSURE: 80 MMHG | TEMPERATURE: 97.6 F | BODY MASS INDEX: 37.32 KG/M2 | SYSTOLIC BLOOD PRESSURE: 132 MMHG | WEIGHT: 224 LBS | HEIGHT: 65 IN

## 2023-05-26 DIAGNOSIS — M51.16 LUMBAR DISC HERNIATION WITH RADICULOPATHY: Primary | ICD-10-CM

## 2023-05-26 PROCEDURE — 1090F PRES/ABSN URINE INCON ASSESS: CPT | Performed by: FAMILY MEDICINE

## 2023-05-26 PROCEDURE — G8417 CALC BMI ABV UP PARAM F/U: HCPCS | Performed by: FAMILY MEDICINE

## 2023-05-26 PROCEDURE — 1036F TOBACCO NON-USER: CPT | Performed by: FAMILY MEDICINE

## 2023-05-26 PROCEDURE — 1123F ACP DISCUSS/DSCN MKR DOCD: CPT | Performed by: FAMILY MEDICINE

## 2023-05-26 PROCEDURE — 99214 OFFICE O/P EST MOD 30 MIN: CPT | Performed by: FAMILY MEDICINE

## 2023-05-26 PROCEDURE — 3079F DIAST BP 80-89 MM HG: CPT | Performed by: FAMILY MEDICINE

## 2023-05-26 PROCEDURE — 3075F SYST BP GE 130 - 139MM HG: CPT | Performed by: FAMILY MEDICINE

## 2023-05-26 PROCEDURE — G8399 PT W/DXA RESULTS DOCUMENT: HCPCS | Performed by: FAMILY MEDICINE

## 2023-05-26 PROCEDURE — G8427 DOCREV CUR MEDS BY ELIG CLIN: HCPCS | Performed by: FAMILY MEDICINE

## 2023-05-26 RX ORDER — GABAPENTIN 300 MG/1
300 CAPSULE ORAL NIGHTLY
Qty: 30 CAPSULE | Refills: 0 | Status: SHIPPED | OUTPATIENT
Start: 2023-05-26 | End: 2023-06-25

## 2023-05-26 ASSESSMENT — ENCOUNTER SYMPTOMS
BACK PAIN: 0
NAUSEA: 0
CONSTIPATION: 0
DIARRHEA: 0
SHORTNESS OF BREATH: 0

## 2023-05-26 NOTE — PROGRESS NOTES
[facility] ([facility]) Physicians  Neurosurgery and Pain [facility]  [address], [address] [facility] , [address] [facility], [place] [address]: [phone]  F: [phone]      Chay De Los Santos  ([birth year])    5/26/2023    Subjective:     Chay De Los Santos is 77 y.o. female who complains today of:    Chief Complaint   Patient presents with    Back Pain     Lower bilateral     Hip Pain       HPI     Patient returns stating that the injections only helped her for about a week to 2 weeks she still having pain that radiates into both buttock  Allergies:  Floxin [ofloxacin], Praluent [alirocumab], Questran [cholestyramine], Lidocaine oint & men-meth sal, Lovaza [omega-3-acid ethyl esters], Niacin and related, Statins, Tobramycin, and Tricor [fenofibrate]    Past Medical History:   Diagnosis Date    Asthma     Hyperlipidemia     Hypertension     Stroke (cerebrum) ([facility] [address] 08/01/2015    TIA (transient ischemic attack)      Past Surgical History:   Procedure Laterality Date    APPENDECTOMY      BREAST BIOPSY Right     benign    CATARACT REMOVAL      COLONOSCOPY N/A 2/20/2023    COLONOSCOPY WITH POLYPECTOMY performed by Dayo De La Rosa MD at [address] [place] [address] Street Left 05/23/2019    primary repairs  and [facility] Bilateral     TONSILLECTOMY AND ADENOIDECTOMY      UMBILICAL HERNIA REPAIR N/A 05/23/2019    Primary repair      Family History   Problem Relation Age of Onset    Colon Cancer Mother     Cancer Mother         BREAST, COLON    Breast Cancer Mother     High Blood Pressure Father      Social History     Socioeconomic History    Marital status:      Spouse name: Not on file    Number of children: Not on file    Years of education: Not on file    Highest education level: Not on file   Occupational History    Not on file   Tobacco Use    Smoking status: Never    Smokeless tobacco: Never   Substance and Sexual Activity    Alcohol use:  No

## 2023-06-08 RX ORDER — DULOXETIN HYDROCHLORIDE 60 MG/1
CAPSULE, DELAYED RELEASE ORAL
Qty: 90 CAPSULE | Refills: 0 | Status: SHIPPED | OUTPATIENT
Start: 2023-06-08

## 2023-07-18 RX ORDER — EZETIMIBE 10 MG/1
TABLET ORAL
Qty: 90 TABLET | Refills: 0 | Status: SHIPPED | OUTPATIENT
Start: 2023-07-18

## 2023-07-18 RX ORDER — FLUTICASONE PROPIONATE AND SALMETEROL 50; 500 UG/1; UG/1
POWDER RESPIRATORY (INHALATION)
Qty: 3 EACH | Refills: 0 | Status: SHIPPED | OUTPATIENT
Start: 2023-07-18

## 2023-08-24 RX ORDER — CARVEDILOL 25 MG/1
TABLET ORAL
Qty: 180 TABLET | Refills: 0 | Status: SHIPPED | OUTPATIENT
Start: 2023-08-24

## 2023-08-28 DIAGNOSIS — R73.03 PRE-DIABETES: ICD-10-CM

## 2023-08-28 DIAGNOSIS — I10 ESSENTIAL HYPERTENSION: ICD-10-CM

## 2023-08-28 DIAGNOSIS — E78.00 PURE HYPERCHOLESTEROLEMIA: ICD-10-CM

## 2023-08-28 LAB
ALBUMIN SERPL-MCNC: 4.1 G/DL (ref 3.5–4.6)
ALP SERPL-CCNC: 88 U/L (ref 40–130)
ALT SERPL-CCNC: 11 U/L (ref 0–33)
ANION GAP SERPL CALCULATED.3IONS-SCNC: 12 MEQ/L (ref 9–15)
AST SERPL-CCNC: 21 U/L (ref 0–35)
BILIRUB SERPL-MCNC: 0.5 MG/DL (ref 0.2–0.7)
BUN SERPL-MCNC: 14 MG/DL (ref 8–23)
CALCIUM SERPL-MCNC: 9.3 MG/DL (ref 8.5–9.9)
CHLORIDE SERPL-SCNC: 100 MEQ/L (ref 95–107)
CHOLEST SERPL-MCNC: 189 MG/DL (ref 0–199)
CO2 SERPL-SCNC: 29 MEQ/L (ref 20–31)
CREAT SERPL-MCNC: 0.64 MG/DL (ref 0.5–0.9)
GLOBULIN SER CALC-MCNC: 3 G/DL (ref 2.3–3.5)
GLUCOSE FASTING: 115 MG/DL (ref 70–99)
HBA1C MFR BLD: 5.8 % (ref 4.8–5.9)
HDLC SERPL-MCNC: 49 MG/DL (ref 40–59)
LDLC SERPL CALC-MCNC: 107 MG/DL (ref 0–129)
POTASSIUM SERPL-SCNC: 4.1 MEQ/L (ref 3.4–4.9)
PROT SERPL-MCNC: 7.1 G/DL (ref 6.3–8)
SODIUM SERPL-SCNC: 141 MEQ/L (ref 135–144)
TRIGL SERPL-MCNC: 165 MG/DL (ref 0–150)

## 2023-09-06 RX ORDER — DULOXETIN HYDROCHLORIDE 60 MG/1
CAPSULE, DELAYED RELEASE ORAL
Qty: 90 CAPSULE | Refills: 0 | Status: SHIPPED | OUTPATIENT
Start: 2023-09-06

## 2023-09-28 ENCOUNTER — TELEPHONE (OUTPATIENT)
Dept: FAMILY MEDICINE CLINIC | Age: 78
End: 2023-09-28

## 2023-09-28 ENCOUNTER — OFFICE VISIT (OUTPATIENT)
Dept: FAMILY MEDICINE CLINIC | Age: 78
End: 2023-09-28
Payer: MEDICARE

## 2023-09-28 VITALS
TEMPERATURE: 97.8 F | RESPIRATION RATE: 14 BRPM | BODY MASS INDEX: 38.82 KG/M2 | WEIGHT: 233 LBS | HEART RATE: 67 BPM | OXYGEN SATURATION: 96 % | HEIGHT: 65 IN | DIASTOLIC BLOOD PRESSURE: 82 MMHG | SYSTOLIC BLOOD PRESSURE: 120 MMHG

## 2023-09-28 DIAGNOSIS — Z23 NEED FOR INFLUENZA VACCINATION: Primary | ICD-10-CM

## 2023-09-28 DIAGNOSIS — I10 ESSENTIAL HYPERTENSION: ICD-10-CM

## 2023-09-28 DIAGNOSIS — F33.2 SEVERE EPISODE OF RECURRENT MAJOR DEPRESSIVE DISORDER, WITHOUT PSYCHOTIC FEATURES (HCC): ICD-10-CM

## 2023-09-28 DIAGNOSIS — R73.03 PRE-DIABETES: ICD-10-CM

## 2023-09-28 DIAGNOSIS — E78.00 PURE HYPERCHOLESTEROLEMIA: ICD-10-CM

## 2023-09-28 DIAGNOSIS — G89.29 CHRONIC BACK PAIN, UNSPECIFIED BACK LOCATION, UNSPECIFIED BACK PAIN LATERALITY: ICD-10-CM

## 2023-09-28 DIAGNOSIS — M54.9 CHRONIC BACK PAIN, UNSPECIFIED BACK LOCATION, UNSPECIFIED BACK PAIN LATERALITY: ICD-10-CM

## 2023-09-28 DIAGNOSIS — J45.40 MODERATE PERSISTENT ASTHMA WITHOUT COMPLICATION: ICD-10-CM

## 2023-09-28 PROCEDURE — 3074F SYST BP LT 130 MM HG: CPT | Performed by: FAMILY MEDICINE

## 2023-09-28 PROCEDURE — G8427 DOCREV CUR MEDS BY ELIG CLIN: HCPCS | Performed by: FAMILY MEDICINE

## 2023-09-28 PROCEDURE — 1090F PRES/ABSN URINE INCON ASSESS: CPT | Performed by: FAMILY MEDICINE

## 2023-09-28 PROCEDURE — 99214 OFFICE O/P EST MOD 30 MIN: CPT | Performed by: FAMILY MEDICINE

## 2023-09-28 PROCEDURE — G0008 ADMIN INFLUENZA VIRUS VAC: HCPCS | Performed by: FAMILY MEDICINE

## 2023-09-28 PROCEDURE — G8417 CALC BMI ABV UP PARAM F/U: HCPCS | Performed by: FAMILY MEDICINE

## 2023-09-28 PROCEDURE — 90694 VACC AIIV4 NO PRSRV 0.5ML IM: CPT | Performed by: FAMILY MEDICINE

## 2023-09-28 PROCEDURE — 3079F DIAST BP 80-89 MM HG: CPT | Performed by: FAMILY MEDICINE

## 2023-09-28 PROCEDURE — 1036F TOBACCO NON-USER: CPT | Performed by: FAMILY MEDICINE

## 2023-09-28 PROCEDURE — 1123F ACP DISCUSS/DSCN MKR DOCD: CPT | Performed by: FAMILY MEDICINE

## 2023-09-28 PROCEDURE — G8399 PT W/DXA RESULTS DOCUMENT: HCPCS | Performed by: FAMILY MEDICINE

## 2023-09-28 RX ORDER — EZETIMIBE 10 MG/1
10 TABLET ORAL DAILY
Qty: 90 TABLET | Refills: 1 | Status: SHIPPED | OUTPATIENT
Start: 2023-09-28

## 2023-09-28 RX ORDER — DOXAZOSIN MESYLATE 4 MG/1
TABLET ORAL
Qty: 90 TABLET | Refills: 1 | Status: SHIPPED | OUTPATIENT
Start: 2023-09-28

## 2023-09-28 RX ORDER — FLUTICASONE PROPIONATE AND SALMETEROL 500; 50 UG/1; UG/1
1 POWDER RESPIRATORY (INHALATION) EVERY 12 HOURS
Qty: 60 EACH | Refills: 3 | Status: CANCELLED | OUTPATIENT
Start: 2023-09-28

## 2023-09-28 RX ORDER — DULOXETIN HYDROCHLORIDE 60 MG/1
60 CAPSULE, DELAYED RELEASE ORAL DAILY
Qty: 90 CAPSULE | Refills: 1 | Status: SHIPPED | OUTPATIENT
Start: 2023-09-28

## 2023-09-28 RX ORDER — FLUTICASONE PROPIONATE AND SALMETEROL 500; 50 UG/1; UG/1
POWDER RESPIRATORY (INHALATION)
Qty: 3 EACH | Refills: 1 | Status: SHIPPED | OUTPATIENT
Start: 2023-09-28

## 2023-09-28 RX ORDER — CARVEDILOL 25 MG/1
25 TABLET ORAL 2 TIMES DAILY WITH MEALS
Qty: 180 TABLET | Refills: 1 | Status: SHIPPED | OUTPATIENT
Start: 2023-09-28

## 2023-09-28 ASSESSMENT — ENCOUNTER SYMPTOMS
VOMITING: 0
COUGH: 0
BACK PAIN: 1
DIARRHEA: 0

## 2023-10-09 DIAGNOSIS — I10 ESSENTIAL HYPERTENSION: ICD-10-CM

## 2023-10-09 RX ORDER — DOXAZOSIN MESYLATE 4 MG/1
TABLET ORAL
Qty: 90 TABLET | Refills: 1 | OUTPATIENT
Start: 2023-10-09

## 2023-10-11 ENCOUNTER — INITIAL CONSULT (OUTPATIENT)
Dept: NEUROSURGERY | Age: 78
End: 2023-10-11
Payer: MEDICARE

## 2023-10-11 VITALS
HEIGHT: 65 IN | WEIGHT: 225 LBS | DIASTOLIC BLOOD PRESSURE: 84 MMHG | SYSTOLIC BLOOD PRESSURE: 132 MMHG | BODY MASS INDEX: 37.49 KG/M2 | TEMPERATURE: 95 F

## 2023-10-11 DIAGNOSIS — M48.062 SPINAL STENOSIS OF LUMBAR REGION WITH NEUROGENIC CLAUDICATION: Primary | ICD-10-CM

## 2023-10-11 PROCEDURE — 3079F DIAST BP 80-89 MM HG: CPT | Performed by: NEUROLOGICAL SURGERY

## 2023-10-11 PROCEDURE — 99204 OFFICE O/P NEW MOD 45 MIN: CPT | Performed by: NEUROLOGICAL SURGERY

## 2023-10-11 PROCEDURE — G8484 FLU IMMUNIZE NO ADMIN: HCPCS | Performed by: NEUROLOGICAL SURGERY

## 2023-10-11 PROCEDURE — G8427 DOCREV CUR MEDS BY ELIG CLIN: HCPCS | Performed by: NEUROLOGICAL SURGERY

## 2023-10-11 PROCEDURE — 1090F PRES/ABSN URINE INCON ASSESS: CPT | Performed by: NEUROLOGICAL SURGERY

## 2023-10-11 PROCEDURE — G8417 CALC BMI ABV UP PARAM F/U: HCPCS | Performed by: NEUROLOGICAL SURGERY

## 2023-10-11 PROCEDURE — 3075F SYST BP GE 130 - 139MM HG: CPT | Performed by: NEUROLOGICAL SURGERY

## 2023-10-11 RX ORDER — TIZANIDINE 4 MG/1
4 TABLET ORAL 3 TIMES DAILY PRN
Qty: 30 TABLET | Refills: 0 | Status: SHIPPED | OUTPATIENT
Start: 2023-10-11

## 2023-10-11 ASSESSMENT — ENCOUNTER SYMPTOMS
EYE PAIN: 0
COUGH: 0
TROUBLE SWALLOWING: 0
ABDOMINAL DISTENTION: 0
ABDOMINAL PAIN: 0
SHORTNESS OF BREATH: 0
BACK PAIN: 1

## 2023-10-11 NOTE — PROGRESS NOTES
Patient Name: Jeremy Rosa : 1945        Date: 10/11/2023      Type of Appt: Consult    Reason for appt: M54.9, G89.29 (ICD-10-CM) - Chronic back pain, unspecified back location, unspecified back pain laterality.  NO NEW STUDIES    Referred by: Dr. Magnus Mccauley    Studies done: No new studies    Smoking: Yes or No    REVIEW OF SYSTEMS:    Rash   Difficulty Urinating Nausea     Fever   Headaches  Bruising/Bleeding Easily     Hearing loss  Constipation  Sleep Disturbance    Shortness of breath Diarrhea  Neck Pain  Back Pain
inhaler USE 1 INHALATION ORALLY IN THE MORNING AND IN THE     EVENING 9/28/23  Yes Oscar Man MD   carvedilol (COREG) 25 MG tablet Take 1 tablet by mouth with breakfast and with evening meal 9/28/23  Yes Oscar Man MD   doxazosin (CARDURA) 4 MG tablet TAKE 1 TABLET NIGHTLY 9/28/23  Yes Oscar Man MD   DULoxetine (CYMBALTA) 60 MG extended release capsule Take 1 capsule by mouth daily 9/28/23  Yes Oscar Man MD   ezetimibe (ZETIA) 10 MG tablet Take 1 tablet by mouth daily 9/28/23  Yes Oscar Man MD   linaCLOtide Josph ) 72 MCG CAPS capsule Take 1 capsule by mouth every morning (before breakfast) 3/9/23  Yes RUMA Snwo - MAYELA   Handicap Placard MISC by Does not apply route Start 3-17-23 to 3-17-24 Dx  Chronic back pain 1/20/23  Yes Oscar Man MD   psyllium (METAMUCIL SMOOTH TEXTURE) 58.6 % powder Take 2 teaspoon with 8 to 10 oz water. 1/3/23  Yes Royetta Kehr, MD   Handicap Placard MISC by Does not apply route Duration of 1 year  Patient is high risk for fall 1/18/22  Yes Oscar Man MD   fluticasone-salmeterol (ADVAIR DISKUS) 500-50 MCG/DOSE diskus inhaler USE 1 INHALATION ORALLY    EVERY 12 HOURS 8/26/21  Yes Oscar Man MD   Multiple Vitamins-Minerals (MULTIVITAMIN PO) Take by mouth daily    Yes ProviderHung MD   gabapentin (NEURONTIN) 300 MG capsule Take 1 capsule by mouth nightly for 30 days. 5/26/23 6/25/23  Mellissa Smith DO   EPINEPHrine (EPIPEN 2-DE) 0.3 MG/0.3ML SOAJ injection Inject 0.3 mLs into the muscle once for 1 dose Use as directed for allergic reaction. Please give generic 6/3/19 12/15/21  Gina Saucedo MD           Allergies:    Allergies   Allergen Reactions    Floxin [Ofloxacin] Anaphylaxis    Praluent [Alirocumab] Anaphylaxis    Questran [Cholestyramine] Anaphylaxis and Swelling    Lidocaine Oint & Men-Meth Jemal Swelling    Lovaza [Omega-3-Acid Ethyl Esters]     Niacin And Related     Statins     Tobramycin

## 2023-10-13 ENCOUNTER — HOSPITAL ENCOUNTER (OUTPATIENT)
Dept: MRI IMAGING | Age: 78
End: 2023-10-13
Attending: NEUROLOGICAL SURGERY
Payer: MEDICARE

## 2023-10-13 ENCOUNTER — HOSPITAL ENCOUNTER (OUTPATIENT)
Dept: GENERAL RADIOLOGY | Age: 78
End: 2023-10-13
Attending: NEUROLOGICAL SURGERY
Payer: MEDICARE

## 2023-10-13 DIAGNOSIS — M48.062 SPINAL STENOSIS OF LUMBAR REGION WITH NEUROGENIC CLAUDICATION: ICD-10-CM

## 2023-10-13 PROCEDURE — 72110 X-RAY EXAM L-2 SPINE 4/>VWS: CPT

## 2023-10-13 PROCEDURE — 72148 MRI LUMBAR SPINE W/O DYE: CPT

## 2023-10-16 ENCOUNTER — TELEPHONE (OUTPATIENT)
Dept: PAIN MANAGEMENT | Age: 78
End: 2023-10-16

## 2023-10-16 ENCOUNTER — TELEPHONE (OUTPATIENT)
Dept: FAMILY MEDICINE CLINIC | Age: 78
End: 2023-10-16

## 2023-10-16 NOTE — TELEPHONE ENCOUNTER
She had a horrible side effect to the muscle relaxer you prescribed. Wondering if you can review her MRI and X-rays that she had done for results and maybe prescribe her something different. Should she see you sooner than November 8th?

## 2023-10-16 NOTE — TELEPHONE ENCOUNTER
Recommend patient call insurance to find out what inhaler in that class would be best covered. We can also try to call the pharmacy to see what will be covered.   Thanks

## 2023-10-16 NOTE — TELEPHONE ENCOUNTER
Patient called and stated that Advair Diskus will not be covered by her insurance, and is asking that if something else can be prescribed?

## 2023-10-17 DIAGNOSIS — I10 ESSENTIAL HYPERTENSION: ICD-10-CM

## 2023-10-17 RX ORDER — DOXAZOSIN MESYLATE 4 MG/1
TABLET ORAL
Qty: 90 TABLET | Refills: 0 | Status: SHIPPED | OUTPATIENT
Start: 2023-10-17

## 2023-10-17 NOTE — PROGRESS NOTES
Patient Name: Wellington Madrid : [birth year]        Date: 10/23/2023      Type of Appt:  Follow up    Reason for appt: 4 WEEK FOLLOW UP AFTER MRI L/S, XRAY L/S @ [facility]    Pt last seen by Dr Sandra Adame on 10/11/23    Studies done:   10/13/23- MRI Lumbar Spine @ [facility]   10/13/23- Xray Lumbar Spine @ [facility] (Need to print report once dictated)    Conservative Tx tried (meds/bracing/ice/rest/OTC) Physical Therapy: Yes  NSAID's: Yes  Narcotics: Yes  Muscle relaxants: Yes  Epidural injections: Yes    Smoking: Yes or No    REVIEW OF SYSTEMS:    Rash   Difficulty Urinating Nausea     Fever   Headaches  Bruising/Bleeding Easily     Hearing loss  Constipation  Sleep Disturbance    Shortness of breath Diarrhea  Neck Pain  Back Pain

## 2023-10-17 NOTE — TELEPHONE ENCOUNTER
Called Pharmacy myself to see what alternatives they could provide for patient for advair diskus. The pharmacist was not able to help and told me to call patient and have Her call her prescription insurance herself to get alternatives. Pt aware will call back with info.

## 2023-10-23 ENCOUNTER — OFFICE VISIT (OUTPATIENT)
Dept: NEUROSURGERY | Age: 78
End: 2023-10-23
Payer: MEDICARE

## 2023-10-23 VITALS
DIASTOLIC BLOOD PRESSURE: 88 MMHG | TEMPERATURE: 97.5 F | BODY MASS INDEX: 36.99 KG/M2 | WEIGHT: 222 LBS | SYSTOLIC BLOOD PRESSURE: 138 MMHG | HEIGHT: 65 IN

## 2023-10-23 DIAGNOSIS — M48.062 SPINAL STENOSIS OF LUMBAR REGION WITH NEUROGENIC CLAUDICATION: Primary | ICD-10-CM

## 2023-10-23 PROCEDURE — G8417 CALC BMI ABV UP PARAM F/U: HCPCS | Performed by: NEUROLOGICAL SURGERY

## 2023-10-23 PROCEDURE — G8427 DOCREV CUR MEDS BY ELIG CLIN: HCPCS | Performed by: NEUROLOGICAL SURGERY

## 2023-10-23 PROCEDURE — 1123F ACP DISCUSS/DSCN MKR DOCD: CPT | Performed by: NEUROLOGICAL SURGERY

## 2023-10-23 PROCEDURE — G8484 FLU IMMUNIZE NO ADMIN: HCPCS | Performed by: NEUROLOGICAL SURGERY

## 2023-10-23 PROCEDURE — 3079F DIAST BP 80-89 MM HG: CPT | Performed by: NEUROLOGICAL SURGERY

## 2023-10-23 PROCEDURE — G8399 PT W/DXA RESULTS DOCUMENT: HCPCS | Performed by: NEUROLOGICAL SURGERY

## 2023-10-23 PROCEDURE — 99214 OFFICE O/P EST MOD 30 MIN: CPT | Performed by: NEUROLOGICAL SURGERY

## 2023-10-23 PROCEDURE — 1036F TOBACCO NON-USER: CPT | Performed by: NEUROLOGICAL SURGERY

## 2023-10-23 PROCEDURE — 3075F SYST BP GE 130 - 139MM HG: CPT | Performed by: NEUROLOGICAL SURGERY

## 2023-10-23 PROCEDURE — 1090F PRES/ABSN URINE INCON ASSESS: CPT | Performed by: NEUROLOGICAL SURGERY

## 2023-10-23 RX ORDER — SODIUM CHLORIDE 9 MG/ML
INJECTION, SOLUTION INTRAVENOUS CONTINUOUS
OUTPATIENT
Start: 2023-10-23

## 2023-10-23 RX ORDER — SODIUM CHLORIDE 0.9 % (FLUSH) 0.9 %
5-40 SYRINGE (ML) INJECTION PRN
OUTPATIENT
Start: 2023-10-23

## 2023-10-23 RX ORDER — SODIUM CHLORIDE 0.9 % (FLUSH) 0.9 %
5-40 SYRINGE (ML) INJECTION EVERY 12 HOURS SCHEDULED
OUTPATIENT
Start: 2023-10-23

## 2023-10-23 RX ORDER — SODIUM CHLORIDE 9 MG/ML
INJECTION, SOLUTION INTRAVENOUS PRN
OUTPATIENT
Start: 2023-10-23

## 2023-10-23 ASSESSMENT — ENCOUNTER SYMPTOMS
COUGH: 0
ABDOMINAL PAIN: 0
ABDOMINAL DISTENTION: 0
TROUBLE SWALLOWING: 0
SHORTNESS OF BREATH: 0
EYE PAIN: 0
BACK PAIN: 1

## 2023-10-23 NOTE — PROGRESS NOTES
NEUROSURGERY and SPINE FOLLOW-UP NOTE      Patient Name: Zacarias Belcher  Patient : 1945  MRN: [id number]       PCP: Mason Rodney MD      History of Present Ilness: 77 y.o. presents with continued LBP and BLE pain, numbness and weakness down to feet. Sx are severe and significantly affecting her life. She has been through multiple courses of medications, gabapentin and epidural injections without significant improvement. She would like to proceed with surgery. She does not take any anticoagulation she occasionally takes aspirin for headache. Chief Complaint   Patient presents with    Back Pain          Conservative Treatments:  Physical Therapy: Yes  NSAID's: Yes  Narcotics: Yes  Muscle relaxants: Yes  Epidural injections: Yes      Past Medical History:        Diagnosis Date    Asthma     Hyperlipidemia     Hypertension     Stroke (cerebrum) ([address]) 2015    TIA (transient ischemic attack)        Past Surgical History:        Procedure Laterality Date    APPENDECTOMY      BREAST BIOPSY Right     benign    CATARACT REMOVAL      COLONOSCOPY N/A 2023    COLONOSCOPY WITH POLYPECTOMY performed by Juan Mccormick MD at [address] Left     HAND [place] Left 2019    primary repairs  and [facility]    HYSTERECTOMY, TOTAL ABDOMINAL (CERVIX REMOVED)      JOINT REPLACEMENT      KNEE ARTHROPLASTY Bilateral     TONSILLECTOMY AND ADENOIDECTOMY      UMBILICAL HERNIA REPAIR N/A 2019    Primary repair        Home Medications:   Prior to Admission medications    Medication Sig Start Date End Date Taking?  Authorizing Provider   ADVAIR DISKUS 500-50 MCG/ACT AEPB diskus inhaler USE 1 INHALATION ORALLY IN THE MORNING AND IN THE     EVENING 10/23/23  Yes Mason Rodney MD   doxazosin (CARDURA) 4 MG tablet TAKE 1 TABLET NIGHTLY 10/17/23  Yes Molly French P, APRN - CNP   tiZANidine (ZANAFLEX) 4 MG tablet Take 1 tablet by mouth 3 times daily as needed

## 2023-10-24 ENCOUNTER — TELEPHONE (OUTPATIENT)
Dept: FAMILY MEDICINE CLINIC | Age: 78
End: 2023-10-24

## 2023-10-25 ENCOUNTER — TELEPHONE (OUTPATIENT)
Dept: FAMILY MEDICINE CLINIC | Age: 78
End: 2023-10-25

## 2023-10-26 ENCOUNTER — TELEPHONE (OUTPATIENT)
Dept: FAMILY MEDICINE CLINIC | Age: 78
End: 2023-10-26

## 2023-10-26 ENCOUNTER — PREP FOR PROCEDURE (OUTPATIENT)
Dept: NEUROSURGERY | Age: 78
End: 2023-10-26

## 2023-10-26 ENCOUNTER — OFFICE VISIT (OUTPATIENT)
Dept: FAMILY MEDICINE CLINIC | Age: 78
End: 2023-10-26
Payer: MEDICARE

## 2023-10-26 VITALS
HEIGHT: 65 IN | RESPIRATION RATE: 14 BRPM | SYSTOLIC BLOOD PRESSURE: 130 MMHG | BODY MASS INDEX: 36.99 KG/M2 | HEART RATE: 82 BPM | OXYGEN SATURATION: 98 % | TEMPERATURE: 97.5 F | DIASTOLIC BLOOD PRESSURE: 82 MMHG | WEIGHT: 222 LBS

## 2023-10-26 DIAGNOSIS — G89.29 CHRONIC BACK PAIN, UNSPECIFIED BACK LOCATION, UNSPECIFIED BACK PAIN LATERALITY: ICD-10-CM

## 2023-10-26 DIAGNOSIS — I10 ESSENTIAL HYPERTENSION: ICD-10-CM

## 2023-10-26 DIAGNOSIS — E78.00 PURE HYPERCHOLESTEROLEMIA: ICD-10-CM

## 2023-10-26 DIAGNOSIS — M48.062 SPINAL STENOSIS, LUMBAR REGION, WITH NEUROGENIC CLAUDICATION: ICD-10-CM

## 2023-10-26 DIAGNOSIS — Z01.818 PRE-OP EXAM: Primary | ICD-10-CM

## 2023-10-26 DIAGNOSIS — F33.2 SEVERE EPISODE OF RECURRENT MAJOR DEPRESSIVE DISORDER, WITHOUT PSYCHOTIC FEATURES (HCC): ICD-10-CM

## 2023-10-26 DIAGNOSIS — J45.40 MODERATE PERSISTENT ASTHMA WITHOUT COMPLICATION: ICD-10-CM

## 2023-10-26 DIAGNOSIS — M54.9 CHRONIC BACK PAIN, UNSPECIFIED BACK LOCATION, UNSPECIFIED BACK PAIN LATERALITY: ICD-10-CM

## 2023-10-26 PROCEDURE — G8399 PT W/DXA RESULTS DOCUMENT: HCPCS | Performed by: FAMILY MEDICINE

## 2023-10-26 PROCEDURE — 1090F PRES/ABSN URINE INCON ASSESS: CPT | Performed by: FAMILY MEDICINE

## 2023-10-26 PROCEDURE — G8427 DOCREV CUR MEDS BY ELIG CLIN: HCPCS | Performed by: FAMILY MEDICINE

## 2023-10-26 PROCEDURE — G8484 FLU IMMUNIZE NO ADMIN: HCPCS | Performed by: FAMILY MEDICINE

## 2023-10-26 PROCEDURE — 1123F ACP DISCUSS/DSCN MKR DOCD: CPT | Performed by: FAMILY MEDICINE

## 2023-10-26 PROCEDURE — 1036F TOBACCO NON-USER: CPT | Performed by: FAMILY MEDICINE

## 2023-10-26 PROCEDURE — G8417 CALC BMI ABV UP PARAM F/U: HCPCS | Performed by: FAMILY MEDICINE

## 2023-10-26 PROCEDURE — 3075F SYST BP GE 130 - 139MM HG: CPT | Performed by: FAMILY MEDICINE

## 2023-10-26 PROCEDURE — 99214 OFFICE O/P EST MOD 30 MIN: CPT | Performed by: FAMILY MEDICINE

## 2023-10-26 PROCEDURE — 3079F DIAST BP 80-89 MM HG: CPT | Performed by: FAMILY MEDICINE

## 2023-10-26 ASSESSMENT — ENCOUNTER SYMPTOMS
VOMITING: 0
DIARRHEA: 0
BACK PAIN: 1
COUGH: 0

## 2023-10-26 ASSESSMENT — PATIENT HEALTH QUESTIONNAIRE - PHQ9
SUM OF ALL RESPONSES TO PHQ QUESTIONS 1-9: 2
SUM OF ALL RESPONSES TO PHQ9 QUESTIONS 1 & 2: 2
2. FEELING DOWN, DEPRESSED OR HOPELESS: 1
SUM OF ALL RESPONSES TO PHQ QUESTIONS 1-9: 2
1. LITTLE INTEREST OR PLEASURE IN DOING THINGS: 1

## 2023-10-26 NOTE — TELEPHONE ENCOUNTER
Called Dr. Epperson Appl office to request PAT testing that is needed prior to patient's surgery that is scheduled on 11-7-23. . She did not come in with any paperwork for pre-op exam. Also faxed request to their office as well.

## 2023-11-01 ENCOUNTER — HOSPITAL ENCOUNTER (OUTPATIENT)
Dept: PREADMISSION TESTING | Age: 78
Discharge: HOME | End: 2023-11-05
Payer: MEDICARE

## 2023-11-01 VITALS
BODY MASS INDEX: 38.32 KG/M2 | HEIGHT: 65 IN | RESPIRATION RATE: 16 BRPM | WEIGHT: 230 LBS | DIASTOLIC BLOOD PRESSURE: 85 MMHG | TEMPERATURE: 97.1 F | HEART RATE: 69 BPM | SYSTOLIC BLOOD PRESSURE: 154 MMHG | OXYGEN SATURATION: 97 %

## 2023-11-01 LAB
ABO + RH BLD: NORMAL
ALBUMIN SERPL-MCNC: 4 G/DL (ref 3.5–4.6)
ALP SERPL-CCNC: 78 U/L (ref 40–130)
ALT SERPL-CCNC: 11 U/L (ref 0–33)
ANION GAP SERPL CALCULATED.3IONS-SCNC: 12 MEQ/L (ref 9–15)
APTT PPP: 41.1 SEC (ref 24.4–36.8)
AST SERPL-CCNC: 19 U/L (ref 0–35)
BASOPHILS # BLD: 0.1 K/UL (ref 0–0.2)
BASOPHILS NFR BLD: 0.8 %
BILIRUB SERPL-MCNC: 0.4 MG/DL (ref 0.2–0.7)
BLD GP AB SCN SERPL QL: NORMAL
BUN SERPL-MCNC: 12 MG/DL (ref 8–23)
CALCIUM SERPL-MCNC: 9.2 MG/DL (ref 8.5–9.9)
CHLORIDE SERPL-SCNC: 101 MEQ/L (ref 95–107)
CO2 SERPL-SCNC: 29 MEQ/L (ref 20–31)
CREAT SERPL-MCNC: 0.69 MG/DL (ref 0.5–0.9)
EOSINOPHIL # BLD: 0.4 K/UL (ref 0–0.7)
EOSINOPHIL NFR BLD: 7.4 %
ERYTHROCYTE [DISTWIDTH] IN BLOOD BY AUTOMATED COUNT: 13.2 % (ref 11.5–14.5)
GLOBULIN SER CALC-MCNC: 2.9 G/DL (ref 2.3–3.5)
GLUCOSE SERPL-MCNC: 169 MG/DL (ref 70–99)
HBA1C MFR BLD: 5.5 % (ref 4.8–5.9)
HCT VFR BLD AUTO: 37.7 % (ref 37–47)
HGB BLD-MCNC: 12.6 G/DL (ref 12–16)
INR PPP: 1.2
LYMPHOCYTES # BLD: 1.2 K/UL (ref 1–4.8)
LYMPHOCYTES NFR BLD: 20 %
MCH RBC QN AUTO: 31.3 PG (ref 27–31.3)
MCHC RBC AUTO-ENTMCNC: 33.4 % (ref 33–37)
MCV RBC AUTO: 93.5 FL (ref 79.4–94.8)
MONOCYTES # BLD: 0.4 K/UL (ref 0.2–0.8)
MONOCYTES NFR BLD: 6.3 %
NEUTROPHILS # BLD: 3.9 K/UL (ref 1.4–6.5)
NEUTS SEG NFR BLD: 65.2 %
PLATELET # BLD AUTO: 172 K/UL (ref 130–400)
POTASSIUM SERPL-SCNC: 3.4 MEQ/L (ref 3.4–4.9)
PROT SERPL-MCNC: 6.9 G/DL (ref 6.3–8)
PROTHROMBIN TIME: 14.8 SEC (ref 12.3–14.9)
RBC # BLD AUTO: 4.03 M/UL (ref 4.2–5.4)
SODIUM SERPL-SCNC: 142 MEQ/L (ref 135–144)
WBC # BLD AUTO: 5.9 K/UL (ref 4.8–10.8)

## 2023-11-01 PROCEDURE — 86901 BLOOD TYPING SEROLOGIC RH(D): CPT

## 2023-11-01 PROCEDURE — 85610 PROTHROMBIN TIME: CPT

## 2023-11-01 PROCEDURE — 93005 ELECTROCARDIOGRAM TRACING: CPT

## 2023-11-01 PROCEDURE — 80053 COMPREHEN METABOLIC PANEL: CPT

## 2023-11-01 PROCEDURE — 87641 MR-STAPH DNA AMP PROBE: CPT

## 2023-11-01 PROCEDURE — 86900 BLOOD TYPING SEROLOGIC ABO: CPT

## 2023-11-01 PROCEDURE — 83036 HEMOGLOBIN GLYCOSYLATED A1C: CPT

## 2023-11-01 PROCEDURE — 85025 COMPLETE CBC W/AUTO DIFF WBC: CPT

## 2023-11-01 PROCEDURE — 86850 RBC ANTIBODY SCREEN: CPT

## 2023-11-01 PROCEDURE — 85730 THROMBOPLASTIN TIME PARTIAL: CPT

## 2023-11-01 NOTE — PROGRESS NOTES
Pt seen for PAT. H/P by Dr. Emeterio Gagnon 10/23/23 in epic. Medical clearance with Dr. Patricia Stroud 10/26/23 in epic-under \"plan\" Dr. Patricia Stroud notes \"will need to clarify PAT-should be acceptable risk for surgery pending pat\". CMP, CBC w/diff, hemoglobin A1C, T&S, PT/INR, PTT, MRSA nasal swab, EKG ordered.

## 2023-11-01 NOTE — H&P (VIEW-ONLY)
Pt seen for PAT. H/P by Dr. Hamzah Alex 10/23/23 in [facility]. Medical clearance with Dr. Angie Hernandez 10/26/23 in epic-under \"plan\" Dr. Angie Hernandez notes \"will need to clarify PAT-should be acceptable risk for surgery pending pat\". CMP, CBC w/diff, hemoglobin A1C, T&S, PT/INR, PTT, MRSA nasal swab, EKG ordered.

## 2023-11-02 ENCOUNTER — TELEPHONE (OUTPATIENT)
Dept: FAMILY MEDICINE CLINIC | Age: 78
End: 2023-11-02

## 2023-11-02 LAB
MRSA, DNA, NASAL: NEGATIVE
SPECIMEN DESCRIPTION: NORMAL

## 2023-11-03 ENCOUNTER — TELEPHONE (OUTPATIENT)
Dept: NEUROSURGERY | Age: 78
End: 2023-11-03

## 2023-11-03 LAB
EKG ATRIAL RATE: 69 BPM
EKG P AXIS: 56 DEGREES
EKG P-R INTERVAL: 156 MS
EKG Q-T INTERVAL: 436 MS
EKG QRS DURATION: 78 MS
EKG QTC CALCULATION (BAZETT): 467 MS
EKG R AXIS: 17 DEGREES
EKG T AXIS: 39 DEGREES
EKG VENTRICULAR RATE: 69 BPM

## 2023-11-03 NOTE — TELEPHONE ENCOUNTER
Patient has questions about her L2, L3, L4, and L5 laminectomies, medial facetectomies and bilateral foraminotomies on 11/7 regarding the incision. She is asking if Dr. Ger Rojo would call her to discuss?

## 2023-11-07 ENCOUNTER — APPOINTMENT (OUTPATIENT)
Dept: GENERAL RADIOLOGY | Age: 78
DRG: 517 | End: 2023-11-07
Attending: NEUROLOGICAL SURGERY
Payer: MEDICARE

## 2023-11-07 ENCOUNTER — HOSPITAL ENCOUNTER (INPATIENT)
Age: 78
LOS: 1 days | Discharge: HOME | DRG: 517 | End: 2023-11-08
Attending: NEUROLOGICAL SURGERY | Admitting: NEUROLOGICAL SURGERY
Payer: MEDICARE

## 2023-11-07 ENCOUNTER — ANESTHESIA (OUTPATIENT)
Dept: OPERATING ROOM | Age: 78
DRG: 517 | End: 2023-11-07
Payer: MEDICARE

## 2023-11-07 ENCOUNTER — ANESTHESIA EVENT (OUTPATIENT)
Dept: OPERATING ROOM | Age: 78
DRG: 517 | End: 2023-11-07
Payer: MEDICARE

## 2023-11-07 DIAGNOSIS — M48.062 SPINAL STENOSIS OF LUMBAR REGION WITH NEUROGENIC CLAUDICATION: Primary | ICD-10-CM

## 2023-11-07 PROCEDURE — 2580000003 HC RX 258: Performed by: NEUROLOGICAL SURGERY

## 2023-11-07 PROCEDURE — 2720000010 HC SURG SUPPLY STERILE: Performed by: NEUROLOGICAL SURGERY

## 2023-11-07 PROCEDURE — 6360000002 HC RX W HCPCS: Performed by: NEUROLOGICAL SURGERY

## 2023-11-07 PROCEDURE — 2709999900 HC NON-CHARGEABLE SUPPLY: Performed by: NEUROLOGICAL SURGERY

## 2023-11-07 PROCEDURE — 6360000002 HC RX W HCPCS: Performed by: STUDENT IN AN ORGANIZED HEALTH CARE EDUCATION/TRAINING PROGRAM

## 2023-11-07 PROCEDURE — 63048 LAM FACETEC &FORAMOT EA ADDL: CPT | Performed by: NEUROLOGICAL SURGERY

## 2023-11-07 PROCEDURE — 2500000003 HC RX 250 WO HCPCS: Performed by: STUDENT IN AN ORGANIZED HEALTH CARE EDUCATION/TRAINING PROGRAM

## 2023-11-07 PROCEDURE — 7100000001 HC PACU RECOVERY - ADDTL 15 MIN: Performed by: NEUROLOGICAL SURGERY

## 2023-11-07 PROCEDURE — 6370000000 HC RX 637 (ALT 250 FOR IP)

## 2023-11-07 PROCEDURE — 1210000000 HC MED SURG R&B

## 2023-11-07 PROCEDURE — 7100000000 HC PACU RECOVERY - FIRST 15 MIN: Performed by: NEUROLOGICAL SURGERY

## 2023-11-07 PROCEDURE — 94640 AIRWAY INHALATION TREATMENT: CPT

## 2023-11-07 PROCEDURE — 63047 LAM FACETEC & FORAMOT LUMBAR: CPT | Performed by: NEUROLOGICAL SURGERY

## 2023-11-07 PROCEDURE — 3700000001 HC ADD 15 MINUTES (ANESTHESIA): Performed by: NEUROLOGICAL SURGERY

## 2023-11-07 PROCEDURE — 3600000004 HC SURGERY LEVEL 4 BASE: Performed by: NEUROLOGICAL SURGERY

## 2023-11-07 PROCEDURE — A4217 STERILE WATER/SALINE, 500 ML: HCPCS | Performed by: NEUROLOGICAL SURGERY

## 2023-11-07 PROCEDURE — 3700000000 HC ANESTHESIA ATTENDED CARE: Performed by: NEUROLOGICAL SURGERY

## 2023-11-07 PROCEDURE — 94150 VITAL CAPACITY TEST: CPT

## 2023-11-07 PROCEDURE — 3600000014 HC SURGERY LEVEL 4 ADDTL 15MIN: Performed by: NEUROLOGICAL SURGERY

## 2023-11-07 PROCEDURE — 94761 N-INVAS EAR/PLS OXIMETRY MLT: CPT

## 2023-11-07 PROCEDURE — 6370000000 HC RX 637 (ALT 250 FOR IP): Performed by: NEUROLOGICAL SURGERY

## 2023-11-07 PROCEDURE — 01NB0ZZ RELEASE LUMBAR NERVE, OPEN APPROACH: ICD-10-PCS | Performed by: NEUROLOGICAL SURGERY

## 2023-11-07 PROCEDURE — 2580000003 HC RX 258: Performed by: STUDENT IN AN ORGANIZED HEALTH CARE EDUCATION/TRAINING PROGRAM

## 2023-11-07 PROCEDURE — C1713 ANCHOR/SCREW BN/BN,TIS/BN: HCPCS | Performed by: NEUROLOGICAL SURGERY

## 2023-11-07 RX ORDER — OXYCODONE HYDROCHLORIDE 5 MG/1
5 TABLET ORAL EVERY 4 HOURS PRN
Status: DISCONTINUED | OUTPATIENT
Start: 2023-11-07 | End: 2023-11-08 | Stop reason: HOSPADM

## 2023-11-07 RX ORDER — SODIUM CHLORIDE 0.9 % (FLUSH) 0.9 %
5-40 SYRINGE (ML) INJECTION EVERY 12 HOURS SCHEDULED
Status: DISCONTINUED | OUTPATIENT
Start: 2023-11-07 | End: 2023-11-07 | Stop reason: HOSPADM

## 2023-11-07 RX ORDER — SODIUM CHLORIDE 9 MG/ML
INJECTION, SOLUTION INTRAVENOUS PRN
Status: DISCONTINUED | OUTPATIENT
Start: 2023-11-07 | End: 2023-11-08 | Stop reason: HOSPADM

## 2023-11-07 RX ORDER — SODIUM CHLORIDE 0.9 % (FLUSH) 0.9 %
5-40 SYRINGE (ML) INJECTION PRN
Status: DISCONTINUED | OUTPATIENT
Start: 2023-11-07 | End: 2023-11-07 | Stop reason: HOSPADM

## 2023-11-07 RX ORDER — DULOXETIN HYDROCHLORIDE 60 MG/1
60 CAPSULE, DELAYED RELEASE ORAL DAILY
Status: DISCONTINUED | OUTPATIENT
Start: 2023-11-07 | End: 2023-11-08 | Stop reason: HOSPADM

## 2023-11-07 RX ORDER — OXYCODONE HYDROCHLORIDE 5 MG/1
5 TABLET ORAL EVERY 6 HOURS PRN
Qty: 28 TABLET | Refills: 0 | Status: SHIPPED | OUTPATIENT
Start: 2023-11-07 | End: 2023-11-14

## 2023-11-07 RX ORDER — OXYCODONE HYDROCHLORIDE 5 MG/1
5 TABLET ORAL EVERY 6 HOURS PRN
Status: DISCONTINUED | OUTPATIENT
Start: 2023-11-07 | End: 2023-11-08 | Stop reason: HOSPADM

## 2023-11-07 RX ORDER — FENTANYL CITRATE 50 UG/ML
INJECTION, SOLUTION INTRAMUSCULAR; INTRAVENOUS PRN
Status: DISCONTINUED | OUTPATIENT
Start: 2023-11-07 | End: 2023-11-07 | Stop reason: SDUPTHER

## 2023-11-07 RX ORDER — BUDESONIDE AND FORMOTEROL FUMARATE DIHYDRATE 80; 4.5 UG/1; UG/1
2 AEROSOL RESPIRATORY (INHALATION)
Status: DISCONTINUED | OUTPATIENT
Start: 2023-11-07 | End: 2023-11-08 | Stop reason: HOSPADM

## 2023-11-07 RX ORDER — CARVEDILOL 25 MG/1
25 TABLET ORAL 2 TIMES DAILY WITH MEALS
Status: DISCONTINUED | OUTPATIENT
Start: 2023-11-07 | End: 2023-11-08 | Stop reason: HOSPADM

## 2023-11-07 RX ORDER — POLYETHYLENE GLYCOL 3350 17 G/17G
17 POWDER, FOR SOLUTION ORAL DAILY PRN
Status: DISCONTINUED | OUTPATIENT
Start: 2023-11-07 | End: 2023-11-08 | Stop reason: HOSPADM

## 2023-11-07 RX ORDER — DIPHENHYDRAMINE HYDROCHLORIDE 50 MG/ML
12.5 INJECTION INTRAMUSCULAR; INTRAVENOUS
Status: DISCONTINUED | OUTPATIENT
Start: 2023-11-07 | End: 2023-11-07 | Stop reason: HOSPADM

## 2023-11-07 RX ORDER — SODIUM CHLORIDE 9 MG/ML
INJECTION, SOLUTION INTRAVENOUS CONTINUOUS
Status: DISCONTINUED | OUTPATIENT
Start: 2023-11-07 | End: 2023-11-07 | Stop reason: HOSPADM

## 2023-11-07 RX ORDER — VANCOMYCIN HYDROCHLORIDE 1 G/20ML
INJECTION, POWDER, LYOPHILIZED, FOR SOLUTION INTRAVENOUS PRN
Status: DISCONTINUED | OUTPATIENT
Start: 2023-11-07 | End: 2023-11-07 | Stop reason: ALTCHOICE

## 2023-11-07 RX ORDER — LANOLIN ALCOHOL/MO/W.PET/CERES
3 CREAM (GRAM) TOPICAL NIGHTLY PRN
Status: DISCONTINUED | OUTPATIENT
Start: 2023-11-07 | End: 2023-11-08 | Stop reason: HOSPADM

## 2023-11-07 RX ORDER — DOXAZOSIN MESYLATE 4 MG/1
4 TABLET ORAL DAILY
Status: DISCONTINUED | OUTPATIENT
Start: 2023-11-07 | End: 2023-11-08 | Stop reason: HOSPADM

## 2023-11-07 RX ORDER — FENTANYL CITRATE 0.05 MG/ML
25 INJECTION, SOLUTION INTRAMUSCULAR; INTRAVENOUS EVERY 5 MIN PRN
Status: DISCONTINUED | OUTPATIENT
Start: 2023-11-07 | End: 2023-11-07 | Stop reason: HOSPADM

## 2023-11-07 RX ORDER — BUPIVACAINE HYDROCHLORIDE 2.5 MG/ML
INJECTION, SOLUTION EPIDURAL; INFILTRATION; INTRACAUDAL PRN
Status: DISCONTINUED | OUTPATIENT
Start: 2023-11-07 | End: 2023-11-07 | Stop reason: ALTCHOICE

## 2023-11-07 RX ORDER — GLYCOPYRROLATE 0.2 MG/ML
INJECTION INTRAMUSCULAR; INTRAVENOUS PRN
Status: DISCONTINUED | OUTPATIENT
Start: 2023-11-07 | End: 2023-11-07 | Stop reason: SDUPTHER

## 2023-11-07 RX ORDER — EZETIMIBE 10 MG/1
10 TABLET ORAL DAILY
Status: DISCONTINUED | OUTPATIENT
Start: 2023-11-07 | End: 2023-11-08 | Stop reason: HOSPADM

## 2023-11-07 RX ORDER — SODIUM CHLORIDE 9 MG/ML
INJECTION, SOLUTION INTRAVENOUS PRN
Status: DISCONTINUED | OUTPATIENT
Start: 2023-11-07 | End: 2023-11-07 | Stop reason: HOSPADM

## 2023-11-07 RX ORDER — DEXAMETHASONE SODIUM PHOSPHATE 10 MG/ML
INJECTION INTRAMUSCULAR; INTRAVENOUS PRN
Status: DISCONTINUED | OUTPATIENT
Start: 2023-11-07 | End: 2023-11-07 | Stop reason: SDUPTHER

## 2023-11-07 RX ORDER — ONDANSETRON 2 MG/ML
INJECTION INTRAMUSCULAR; INTRAVENOUS PRN
Status: DISCONTINUED | OUTPATIENT
Start: 2023-11-07 | End: 2023-11-07 | Stop reason: SDUPTHER

## 2023-11-07 RX ORDER — SODIUM CHLORIDE 0.9 % (FLUSH) 0.9 %
5-40 SYRINGE (ML) INJECTION EVERY 12 HOURS SCHEDULED
Status: DISCONTINUED | OUTPATIENT
Start: 2023-11-07 | End: 2023-11-08 | Stop reason: HOSPADM

## 2023-11-07 RX ORDER — SODIUM CHLORIDE 0.9 % (FLUSH) 0.9 %
5-40 SYRINGE (ML) INJECTION PRN
Status: DISCONTINUED | OUTPATIENT
Start: 2023-11-07 | End: 2023-11-08 | Stop reason: HOSPADM

## 2023-11-07 RX ORDER — MAGNESIUM HYDROXIDE 1200 MG/15ML
LIQUID ORAL CONTINUOUS PRN
Status: COMPLETED | OUTPATIENT
Start: 2023-11-07 | End: 2023-11-07

## 2023-11-07 RX ORDER — OXYCODONE HYDROCHLORIDE 5 MG/1
5 TABLET ORAL
Status: DISCONTINUED | OUTPATIENT
Start: 2023-11-07 | End: 2023-11-07 | Stop reason: HOSPADM

## 2023-11-07 RX ORDER — EPHEDRINE SULFATE/0.9% NACL/PF 50 MG/5 ML
SYRINGE (ML) INTRAVENOUS PRN
Status: DISCONTINUED | OUTPATIENT
Start: 2023-11-07 | End: 2023-11-07 | Stop reason: SDUPTHER

## 2023-11-07 RX ORDER — ACETAMINOPHEN 325 MG/1
650 TABLET ORAL EVERY 6 HOURS
Status: DISCONTINUED | OUTPATIENT
Start: 2023-11-07 | End: 2023-11-08 | Stop reason: HOSPADM

## 2023-11-07 RX ORDER — BISACODYL 5 MG/1
5 TABLET, DELAYED RELEASE ORAL DAILY
Status: DISCONTINUED | OUTPATIENT
Start: 2023-11-07 | End: 2023-11-08 | Stop reason: HOSPADM

## 2023-11-07 RX ORDER — SODIUM CHLORIDE 9 MG/ML
INJECTION, SOLUTION INTRAVENOUS CONTINUOUS
Status: DISCONTINUED | OUTPATIENT
Start: 2023-11-07 | End: 2023-11-08 | Stop reason: HOSPADM

## 2023-11-07 RX ORDER — METOCLOPRAMIDE HYDROCHLORIDE 5 MG/ML
10 INJECTION INTRAMUSCULAR; INTRAVENOUS
Status: DISCONTINUED | OUTPATIENT
Start: 2023-11-07 | End: 2023-11-07 | Stop reason: HOSPADM

## 2023-11-07 RX ORDER — KETOROLAC TROMETHAMINE 30 MG/ML
INJECTION, SOLUTION INTRAMUSCULAR; INTRAVENOUS PRN
Status: DISCONTINUED | OUTPATIENT
Start: 2023-11-07 | End: 2023-11-07 | Stop reason: SDUPTHER

## 2023-11-07 RX ORDER — ONDANSETRON 2 MG/ML
4 INJECTION INTRAMUSCULAR; INTRAVENOUS
Status: DISCONTINUED | OUTPATIENT
Start: 2023-11-07 | End: 2023-11-07 | Stop reason: HOSPADM

## 2023-11-07 RX ORDER — ACETAMINOPHEN 325 MG/1
650 TABLET ORAL ONCE
Status: DISCONTINUED | OUTPATIENT
Start: 2023-11-07 | End: 2023-11-07 | Stop reason: HOSPADM

## 2023-11-07 RX ORDER — PHENYLEPHRINE HYDROCHLORIDE 10 MG/ML
INJECTION INTRAVENOUS PRN
Status: DISCONTINUED | OUTPATIENT
Start: 2023-11-07 | End: 2023-11-07 | Stop reason: SDUPTHER

## 2023-11-07 RX ORDER — ROCURONIUM BROMIDE 10 MG/ML
INJECTION, SOLUTION INTRAVENOUS PRN
Status: DISCONTINUED | OUTPATIENT
Start: 2023-11-07 | End: 2023-11-07 | Stop reason: SDUPTHER

## 2023-11-07 RX ORDER — PROPOFOL 10 MG/ML
INJECTION, EMULSION INTRAVENOUS PRN
Status: DISCONTINUED | OUTPATIENT
Start: 2023-11-07 | End: 2023-11-07 | Stop reason: SDUPTHER

## 2023-11-07 RX ORDER — ONDANSETRON 2 MG/ML
4 INJECTION INTRAMUSCULAR; INTRAVENOUS EVERY 6 HOURS PRN
Status: DISCONTINUED | OUTPATIENT
Start: 2023-11-07 | End: 2023-11-08 | Stop reason: HOSPADM

## 2023-11-07 RX ORDER — SODIUM CHLORIDE, SODIUM LACTATE, POTASSIUM CHLORIDE, CALCIUM CHLORIDE 600; 310; 30; 20 MG/100ML; MG/100ML; MG/100ML; MG/100ML
INJECTION, SOLUTION INTRAVENOUS CONTINUOUS PRN
Status: DISCONTINUED | OUTPATIENT
Start: 2023-11-07 | End: 2023-11-07 | Stop reason: SDUPTHER

## 2023-11-07 RX ORDER — MIDAZOLAM HYDROCHLORIDE 1 MG/ML
INJECTION INTRAMUSCULAR; INTRAVENOUS PRN
Status: DISCONTINUED | OUTPATIENT
Start: 2023-11-07 | End: 2023-11-07 | Stop reason: SDUPTHER

## 2023-11-07 RX ADMIN — PHENYLEPHRINE HYDROCHLORIDE 100 MCG: 10 INJECTION INTRAVENOUS at 08:23

## 2023-11-07 RX ADMIN — Medication 3 MG: at 22:07

## 2023-11-07 RX ADMIN — PHENYLEPHRINE HYDROCHLORIDE 100 MCG: 10 INJECTION INTRAVENOUS at 08:26

## 2023-11-07 RX ADMIN — CEFAZOLIN 2000 MG: 2 INJECTION, POWDER, FOR SOLUTION INTRAMUSCULAR; INTRAVENOUS at 07:45

## 2023-11-07 RX ADMIN — Medication 30 MG: at 08:57

## 2023-11-07 RX ADMIN — ONDANSETRON 4 MG: 2 INJECTION INTRAMUSCULAR; INTRAVENOUS at 08:45

## 2023-11-07 RX ADMIN — Medication 10 MG: at 08:12

## 2023-11-07 RX ADMIN — SODIUM CHLORIDE, PRESERVATIVE FREE 10 ML: 5 INJECTION INTRAVENOUS at 14:26

## 2023-11-07 RX ADMIN — PROPOFOL 120 MG: 10 INJECTION, EMULSION INTRAVENOUS at 07:37

## 2023-11-07 RX ADMIN — GLYCOPYRROLATE 0.2 MG: 0.2 INJECTION INTRAMUSCULAR; INTRAVENOUS at 08:20

## 2023-11-07 RX ADMIN — OXYCODONE 5 MG: 5 TABLET ORAL at 14:25

## 2023-11-07 RX ADMIN — Medication 20 MG: at 08:17

## 2023-11-07 RX ADMIN — BUDESONIDE AND FORMOTEROL FUMARATE DIHYDRATE 2 PUFF: 80; 4.5 AEROSOL RESPIRATORY (INHALATION) at 21:44

## 2023-11-07 RX ADMIN — FENTANYL CITRATE 50 MCG: 50 INJECTION, SOLUTION INTRAMUSCULAR; INTRAVENOUS at 09:05

## 2023-11-07 RX ADMIN — FENTANYL CITRATE 50 MCG: 50 INJECTION, SOLUTION INTRAMUSCULAR; INTRAVENOUS at 07:37

## 2023-11-07 RX ADMIN — SODIUM CHLORIDE: 9 INJECTION, SOLUTION INTRAVENOUS at 14:21

## 2023-11-07 RX ADMIN — ROCURONIUM BROMIDE 80 MG: 10 INJECTION, SOLUTION INTRAVENOUS at 07:37

## 2023-11-07 RX ADMIN — Medication 20 MG: at 08:20

## 2023-11-07 RX ADMIN — SODIUM CHLORIDE: 9 INJECTION, SOLUTION INTRAVENOUS at 07:02

## 2023-11-07 RX ADMIN — ACETAMINOPHEN 650 MG: 325 TABLET ORAL at 20:53

## 2023-11-07 RX ADMIN — PHENYLEPHRINE HYDROCHLORIDE 200 MCG: 10 INJECTION INTRAVENOUS at 08:31

## 2023-11-07 RX ADMIN — CEFAZOLIN 2000 MG: 2 INJECTION, POWDER, FOR SOLUTION INTRAMUSCULAR; INTRAVENOUS at 14:20

## 2023-11-07 RX ADMIN — MIDAZOLAM HYDROCHLORIDE 2 MG: 1 INJECTION, SOLUTION INTRAMUSCULAR; INTRAVENOUS at 07:30

## 2023-11-07 RX ADMIN — DEXAMETHASONE SODIUM PHOSPHATE 10 MG: 10 INJECTION INTRAMUSCULAR; INTRAVENOUS at 07:50

## 2023-11-07 RX ADMIN — CEFAZOLIN 2000 MG: 2 INJECTION, POWDER, FOR SOLUTION INTRAMUSCULAR; INTRAVENOUS at 23:32

## 2023-11-07 RX ADMIN — EZETIMIBE 10 MG: 10 TABLET ORAL at 14:25

## 2023-11-07 RX ADMIN — CARVEDILOL 25 MG: 25 TABLET, FILM COATED ORAL at 18:27

## 2023-11-07 RX ADMIN — SODIUM CHLORIDE, POTASSIUM CHLORIDE, SODIUM LACTATE AND CALCIUM CHLORIDE: 600; 310; 30; 20 INJECTION, SOLUTION INTRAVENOUS at 08:50

## 2023-11-07 RX ADMIN — SODIUM CHLORIDE: 9 INJECTION, SOLUTION INTRAVENOUS at 07:30

## 2023-11-07 RX ADMIN — SODIUM CHLORIDE, PRESERVATIVE FREE 10 ML: 5 INJECTION INTRAVENOUS at 20:54

## 2023-11-07 RX ADMIN — DULOXETINE HYDROCHLORIDE 60 MG: 60 CAPSULE, DELAYED RELEASE ORAL at 14:25

## 2023-11-07 ASSESSMENT — PAIN DESCRIPTION - DESCRIPTORS: DESCRIPTORS: DULL;ACHING

## 2023-11-07 ASSESSMENT — PAIN DESCRIPTION - PAIN TYPE
TYPE: CHRONIC PAIN
TYPE: CHRONIC PAIN
TYPE: SURGICAL PAIN;CHRONIC PAIN

## 2023-11-07 ASSESSMENT — PAIN SCALES - GENERAL
PAINLEVEL_OUTOF10: 4
PAINLEVEL_OUTOF10: 3
PAINLEVEL_OUTOF10: 0
PAINLEVEL_OUTOF10: 0

## 2023-11-07 ASSESSMENT — PAIN DESCRIPTION - ORIENTATION: ORIENTATION: LOWER

## 2023-11-07 ASSESSMENT — PAIN DESCRIPTION - LOCATION
LOCATION: BACK

## 2023-11-07 ASSESSMENT — PAIN DESCRIPTION - FREQUENCY: FREQUENCY: INTERMITTENT

## 2023-11-07 NOTE — ANESTHESIA PRE PROCEDURE
11/01/2023 09:23 AM       CMP:   Lab Results   Component Value Date/Time     11/01/2023 09:27 AM    K 3.4 11/01/2023 09:27 AM     11/01/2023 09:27 AM    CO2 29 11/01/2023 09:27 AM    BUN 12 11/01/2023 09:27 AM    CREATININE 0.69 11/01/2023 09:27 AM    GFRAA >60.0 08/23/2021 09:39 AM    AGRATIO 1.3 05/24/2018 10:03 AM    LABGLOM >60.0 11/01/2023 09:27 AM    GLUCOSE 169 11/01/2023 09:27 AM    GLUCOSE 107 05/24/2018 10:03 AM    PROT 6.9 11/01/2023 09:27 AM    CALCIUM 9.2 11/01/2023 09:27 AM    BILITOT 0.4 11/01/2023 09:27 AM    ALKPHOS 78 11/01/2023 09:27 AM    AST 19 11/01/2023 09:27 AM    ALT 11 11/01/2023 09:27 AM       POC Tests: No results for input(s): \"POCGLU\", \"POCNA\", \"POCK\", \"POCCL\", \"POCBUN\", \"POCHEMO\", \"POCHCT\" in the last 72 hours.     Coags:   Lab Results   Component Value Date/Time    PROTIME 14.8 11/01/2023 09:23 AM    PROTIME 10.1 11/22/2011 12:22 PM    INR 1.2 11/01/2023 09:23 AM    APTT 41.1 11/01/2023 09:23 AM       HCG (If Applicable): No results found for: \"PREGTESTUR\", \"PREGSERUM\", \"HCG\", \"HCGQUANT\"     ABGs: No results found for: \"PHART\", \"PO2ART\", \"LQB0GYJ\", \"EAS8DRD\", \"BEART\", \"U3PTQOYD\"     Type & Screen (If Applicable):  No results found for: \"LABABO\", \"LABRH\"    Drug/Infectious Status (If Applicable):  No results found for: \"HIV\", \"HEPCAB\"    COVID-19 Screening (If Applicable):   Lab Results   Component Value Date/Time    COVID19 Not Detected 11/29/2020 10:04 AM           Anesthesia Evaluation  Patient summary reviewed and Nursing notes reviewed no history of anesthetic complications:   Airway: Mallampati: III  TM distance: >3 FB   Neck ROM: full  Mouth opening: > = 3 FB   Dental: normal exam         Pulmonary:normal exam    (+) asthma:                            Cardiovascular:  Exercise tolerance: good (>4 METS),   (+) hypertension:, dysrhythmias: atrial fibrillation,       ECG reviewed      Echocardiogram reviewed         Beta Blocker:  Dose within 24 Hrs

## 2023-11-07 NOTE — OP NOTE
Department of Neurosurgery and  Spine Service  Operative Note          Patient: Melissa Lang  YOB: 1945  MRN: [id number]    Date of Procedure: 11/7/2023    Pre-Op Diagnosis Codes:     * Spinal stenosis, lumbar region, with neurogenic claudication [M48.062]    Post-Op Diagnosis: Same       Procedure(s):  L2, L3, L4, and L5 laminectomies, medial facetectomies and bilateral foraminotomies. Surgeon(s):  Coleen Philip MD    Assistant:   First Assistant: Martin NINA    Anesthesia: General    Estimated Blood Loss (mL): 034 cc    Complications: None    Specimens:   * No specimens in log *    Implants:  * No implants in log *      Drains:   Closed/Suction Drain Inferior;Midline Back Accordion (Active)       Urinary Catheter 11/07/23 Sales-Temperature (Active)               Detailed Description of Procedure:   Operative Procedure: The patient was brought to the operating room, and after successful induction of general endotracheal anesthesia, the patient was rolled into the prone on the OR table with all pressure points adequately padded. The incision was marked in the midline lumbar region and the patient was prepped and draped in the usual sterile fashion. Time out was done. Pre-operative I.V. antibiotics where administered. The skin was incised and the dissection was carried down to the fascia which was opened and the L2 through L5 levels were exposed in a subperiosteal fashion. Intraoperative x-ray confirmed that we were at the appropriate levels. Using combination of high-speed drill, rhongeur's and Kerrison punches, L2, L3, L4 and L5 laminectomies, medial facetectomies and bilateral foraminotomies were performed to decompress the bilateral L2, L3, L4 and L5 nerve roots. After adequate decompression was confirmed,  the wound was copiously irrigated with irrigation, and Marcaine was infiltrated into the paraspinal musculature.  Hemostasis was obtained, vancomycin powder was placed into

## 2023-11-07 NOTE — INTERVAL H&P NOTE
Update History & Physical    The patient's History and Physical  was reviewed with the patient and I examined the patient. There was no change. The surgical site was confirmed by the patient and me. Plan: The risks, benefits, expected outcome, and alternative to the recommended procedure have been discussed with the patient. Patient understands and wants to proceed with the procedure.      Electronically signed by Minh Reid MD on 11/7/2023 at 7:12 AM

## 2023-11-07 NOTE — DISCHARGE INSTRUCTIONS
No lifting greater than 30 pounds x 3 weeks. May shower, weight 1 week to soak in tub. To follow-up with nurse practitioner in 2 weeks, to follow-up with Dr. Kei Lane in 4 weeks.

## 2023-11-07 NOTE — ACP (ADVANCE CARE PLANNING)
Advance Care Planning   Healthcare Decision Maker:    Primary Decision Maker (Active): Ivanna Sales - [phone]    Secondary Decision Maker: Yousif Mejoceline - Child - [phone]    Click here to complete Healthcare Decision Makers including selection of the Healthcare Decision Maker Relationship (ie \"Primary\").

## 2023-11-07 NOTE — FLOWSHEET NOTE
Pt arrived to the floor via bed Checked dressing on back and it is dry ,clean and intact. HASMUKH drain intact with very little draining in the pouch. Pt did not receive a lunch tray so pulled some things out of the frigate and made her a little snack to hold her to dinner. Pt tolerated well. Vitals obtained 155/67 HR 70   Resp 18 and is 95% on RA  temp 98.2  Henrry was D?C at 9:33 AM Going to get her up to the bathroom soon. Medications given that were ordered. Denies any discomfort Has been talking on the phone. Taking fluids well. Electronically signed by Sharonda Thurston LPN on 38/6/2897 at 1:94 PM  Pt  assisted to the bathroom where she voided and then walked to the chair in her room. Pt sitting in chair surrounded by family. Denies any discomfort . Pt eating dinner tolerating it well. Electronically signed by Sharonda Thurston LPN on 38/5/0964 at 0:94 PM

## 2023-11-07 NOTE — ANESTHESIA POSTPROCEDURE EVALUATION
Department of Anesthesiology  Postprocedure Note    Patient: Marko Martinez  MRN: [id number]  YOB: 1945  Date of evaluation: 11/7/2023      Procedure Summary     Date: 11/07/23 Room / Location: [address]    Anesthesia Start: 0730 Anesthesia Stop: 9071    Procedure: L2, L3, L4, and L5 laminectomies, medial facetectomies and bilateral foraminotomies.  (Spine Lumbar) Diagnosis:       Spinal stenosis, lumbar region, with neurogenic claudication      (Spinal stenosis, lumbar region, with neurogenic claudication [T33.515])    Surgeons: Angelika Campbell MD Responsible Provider: Tammy Myers MD    Anesthesia Type: General ASA Status: 3          Anesthesia Type: General    Vandana Phase I: Vandana Score: 10    Vandana Phase II:        Anesthesia Post Evaluation    Patient location during evaluation: bedside  Patient participation: complete - patient participated  Level of consciousness: awake and awake and alert  Pain score: 3  Airway patency: patent  Nausea & Vomiting: no nausea and no vomiting  Complications: no  Cardiovascular status: blood pressure returned to baseline and hemodynamically stable  Respiratory status: acceptable  Hydration status: euvolemic  Pain management: adequate

## 2023-11-07 NOTE — CARE COORDINATION
Case Management Assessment  Initial Evaluation    Date/Time of Evaluation: 11/7/2023 3:20 PM  Assessment Completed by: Yohannes Branch RN    If patient is discharged prior to next notation, then this note serves as note for discharge by case management. Patient Name: Gucci Mckoy                   YOB: 1945  Diagnosis: Spinal stenosis, lumbar region, with neurogenic claudication [M48.062]  Lumbar stenosis with neurogenic claudication [M48.062]                   Date / Time: 11/7/2023  6:18 AM    Patient Admission Status: Inpatient   Readmission Risk (Low < 19, Mod (19-27), High > 27): No data recorded  Current PCP: Faith Ruby MD  PCP verified by ? Yes    Chart Reviewed: Yes      History Provided by: Patient  Patient Orientation: Alert and Oriented, Person, Place, Situation, Self    Patient Cognition: Alert    Hospitalization in the last 30 days (Readmission):  No    If yes, Readmission Assessment in  Navigator will be completed. Advance Directives:      Code Status: Full Code   Patient's Primary Decision Maker is: Legal Next of Kin    Primary Decision Maker (Active): Janet Boyd - [phone]    Secondary Decision Maker: Noe Watson [facility] - [phone]    Discharge Planning:    Patient lives with:   Type of Home:    Primary Care Giver: Self  Patient Support Systems include: Spouse/Significant Other   Current Financial resources:    Current community resources:    Current services prior to admission:              Current DME:              Type of Home Care services:       ADLS  Prior functional level: Independent in ADLs/IADLs  Current functional level: Independent in ADLs/IADLs    PT AM-PAC:   /24  OT AM-PAC:   /24    Family can provide assistance at DC: Yes  Would you like Case Management to discuss the discharge plan with any other family members/significant others, and if so, who?  No  Plans to Return to Present Housing: Yes  Other Identified Issues/Barriers to

## 2023-11-08 VITALS
DIASTOLIC BLOOD PRESSURE: 70 MMHG | RESPIRATION RATE: 20 BRPM | OXYGEN SATURATION: 93 % | TEMPERATURE: 97.6 F | BODY MASS INDEX: 36.99 KG/M2 | SYSTOLIC BLOOD PRESSURE: 118 MMHG | WEIGHT: 222 LBS | HEART RATE: 92 BPM | HEIGHT: 65 IN

## 2023-11-08 PROCEDURE — 6370000000 HC RX 637 (ALT 250 FOR IP): Performed by: NEUROLOGICAL SURGERY

## 2023-11-08 PROCEDURE — 94640 AIRWAY INHALATION TREATMENT: CPT

## 2023-11-08 PROCEDURE — 2580000003 HC RX 258: Performed by: NEUROLOGICAL SURGERY

## 2023-11-08 PROCEDURE — 99024 POSTOP FOLLOW-UP VISIT: CPT | Performed by: NEUROLOGICAL SURGERY

## 2023-11-08 RX ADMIN — OXYCODONE 5 MG: 5 TABLET ORAL at 03:34

## 2023-11-08 RX ADMIN — BUDESONIDE AND FORMOTEROL FUMARATE DIHYDRATE 2 PUFF: 80; 4.5 AEROSOL RESPIRATORY (INHALATION) at 07:37

## 2023-11-08 RX ADMIN — BISACODYL 5 MG: 5 TABLET, COATED ORAL at 08:12

## 2023-11-08 RX ADMIN — SODIUM CHLORIDE, PRESERVATIVE FREE 10 ML: 5 INJECTION INTRAVENOUS at 08:13

## 2023-11-08 RX ADMIN — CARVEDILOL 25 MG: 25 TABLET, FILM COATED ORAL at 08:11

## 2023-11-08 RX ADMIN — DOXAZOSIN 4 MG: 4 TABLET ORAL at 08:12

## 2023-11-08 RX ADMIN — DULOXETINE HYDROCHLORIDE 60 MG: 60 CAPSULE, DELAYED RELEASE ORAL at 08:12

## 2023-11-08 RX ADMIN — ACETAMINOPHEN 650 MG: 325 TABLET ORAL at 08:12

## 2023-11-08 RX ADMIN — EZETIMIBE 10 MG: 10 TABLET ORAL at 08:12

## 2023-11-08 ASSESSMENT — PAIN SCALES - GENERAL
PAINLEVEL_OUTOF10: 5
PAINLEVEL_OUTOF10: 2

## 2023-11-08 ASSESSMENT — PAIN DESCRIPTION - ORIENTATION: ORIENTATION: LOWER

## 2023-11-08 ASSESSMENT — PAIN DESCRIPTION - LOCATION
LOCATION: BACK
LOCATION: BACK

## 2023-11-08 ASSESSMENT — PAIN DESCRIPTION - DESCRIPTORS: DESCRIPTORS: ACHING

## 2023-11-08 ASSESSMENT — PAIN DESCRIPTION - PAIN TYPE: TYPE: SURGICAL PAIN

## 2023-11-08 NOTE — PROGRESS NOTES
She is doing well with complete resolution of leg symptoms, she has minimal back pain. She has been eating and voiding and ambulating and wishes to go home. Lower extremities 5 out of 5, sensation grossly intact to light touch  Drain 110, 90-removed without difficulty  Assessment and plan: Postop day 1 status post L2-5 decompression, she has been doing very well with complete resolution of leg symptoms. I will discharge her to home.   Abner Boeck, MD

## 2023-11-08 NOTE — DISCHARGE SUMMARY
Discharge Summary    Leola Tobar  :    MRN:  [id number]    ADMIT DATE:  2023  DISCHARGE DATE:  2023    PRIMARY CARE PHYSICIAN:  Jose J Means MD    VISIT STATUS: Admission    CODE STATUS:  Full Code    DISCHARGE DIAGNOSES:  Principal Problem:    Spinal stenosis, lumbar region, with neurogenic claudication  Active Problems:    Lumbar stenosis with neurogenic claudication  Resolved Problems:    * No resolved hospital problems. *      SURGICAL PROCEDURES:  L2-5 decompression on 2023    HOSPITAL COURSE:  The patient was admitted to the hospital on the day of surgery and underwent the above noted procedure. Post-operatively, the patient was transferred to the PACU in stable condition and then to [facility]. They received 24 hours of prophylactic intravenous antibiotics. DVT prophylaxis included SCDs and early ambulation. Diet was advanced, patient was ambulated and able to urinate, and pain was reasonably controlled. The surgical drain was removed on postop day 1 without difficulty. The patient progressed well throughout the hospitalization and was deemed stable for discharge to home on the above listed date. CONSULTANTS:  Hospitalist    DISCHARGE MEDICATIONS:         Medication List        START taking these medications      oxyCODONE 5 MG immediate release tablet  Commonly known as: Roxicodone  Take 1 tablet by mouth every 6 hours as needed for Pain for up to 7 days. Intended supply: 7 days.  Take lowest dose possible to manage pain Max Daily Amount: 20 mg            CONTINUE taking these medications      Advair Diskus 500-50 MCG/ACT Aepb diskus inhaler  Generic drug: fluticasone-salmeterol  USE 1 INHALATION ORALLY IN THE MORNING AND IN THE     EVENING     carvedilol 25 MG tablet  Commonly known as: COREG  Take 1 tablet by mouth with breakfast and with evening meal     doxazosin 4 MG tablet  Commonly known as: CARDURA  TAKE 1 TABLET NIGHTLY     DULoxetine 60 MG extended release

## 2023-11-08 NOTE — PROGRESS NOTES
Physical Therapy  Facility/Department: [facility] MED SURG L815/H231-52    NAME: Nay Laughlin    : [birth year] (50 y.o.)  MRN: [id number]    Account: [de-identified]  Gender: female      PT referral received. Chart reviewed. Pt declined need for PT eval due to pt stating she is indep with mobility and does not have concerns for home going.       Gisella Mora, PT, 23 at 10:27 AM

## 2023-11-08 NOTE — PROGRESS NOTES
Occupational Therapy  Facility/Department: [facility] MED SURG L617/R790-80  Interdisciplinary Communication Note      To the referring provider,     While we thank you for your referral, Zacarias Belcher was not seen for an evaluation as: The patient refused skilled OT intervention, denying a need. Pt up independently in room and had already bathed and dressed on therapist arrival. States she has equipment at home if needed, states her balance feels good and denies concerns. Thank you for your referral,    Electronically signed by FRANKIE Reyes on 11/8/23 at 10:28 AM EST    [facility] EMERGENCY [facility] AT [place] [facility] Department.

## 2023-11-22 ENCOUNTER — OFFICE VISIT (OUTPATIENT)
Dept: PAIN MANAGEMENT | Age: 78
End: 2023-11-22

## 2023-11-22 VITALS
HEIGHT: 65 IN | DIASTOLIC BLOOD PRESSURE: 76 MMHG | TEMPERATURE: 97 F | SYSTOLIC BLOOD PRESSURE: 132 MMHG | BODY MASS INDEX: 36.99 KG/M2 | WEIGHT: 222 LBS

## 2023-11-22 DIAGNOSIS — Z48.89 ENCOUNTER FOR POST SURGICAL WOUND CHECK: Primary | ICD-10-CM

## 2023-11-22 PROCEDURE — 99024 POSTOP FOLLOW-UP VISIT: CPT | Performed by: NURSE PRACTITIONER

## 2023-11-22 NOTE — PROGRESS NOTES
S/P L2, L3, L4, and L5 laminectomies, medial facetectomies and bilateral foraminotomies. on 11/7/23 with Dr Debbie Díaz. Feels good, occasional leg pain but much improved.    Walks without assistive device and gets in and out of chair pushing off chair arms    Pain meds Excedrin  Incision approximated, mostly healed, small scab, no signs infection      PLAN: she declines PT; recommend Tylenol rather then daily Excedrin; keep f/u with Dr Mickie Holstein

## 2023-11-28 ENCOUNTER — PREP FOR PROCEDURE (OUTPATIENT)
Dept: GASTROENTEROLOGY | Age: 78
End: 2023-11-28

## 2023-11-28 ENCOUNTER — OFFICE VISIT (OUTPATIENT)
Dept: GASTROENTEROLOGY | Age: 78
End: 2023-11-28
Payer: MEDICARE

## 2023-11-28 VITALS — HEIGHT: 65 IN | WEIGHT: 229 LBS | OXYGEN SATURATION: 98 % | HEART RATE: 77 BPM | BODY MASS INDEX: 38.15 KG/M2

## 2023-11-28 DIAGNOSIS — R19.8 ALTERNATING CONSTIPATION AND DIARRHEA: ICD-10-CM

## 2023-11-28 DIAGNOSIS — Z86.010 HISTORY OF COLON POLYPS: Primary | ICD-10-CM

## 2023-11-28 DIAGNOSIS — Z86.010 HISTORY OF COLON POLYPS: ICD-10-CM

## 2023-11-28 DIAGNOSIS — Z80.0 FAMILY HISTORY OF COLON CANCER IN MOTHER: ICD-10-CM

## 2023-11-28 PROBLEM — Z86.0100 HISTORY OF COLON POLYPS: Status: ACTIVE | Noted: 2023-11-28

## 2023-11-28 PROCEDURE — 1036F TOBACCO NON-USER: CPT | Performed by: INTERNAL MEDICINE

## 2023-11-28 PROCEDURE — G8417 CALC BMI ABV UP PARAM F/U: HCPCS | Performed by: INTERNAL MEDICINE

## 2023-11-28 PROCEDURE — 1090F PRES/ABSN URINE INCON ASSESS: CPT | Performed by: INTERNAL MEDICINE

## 2023-11-28 PROCEDURE — 1111F DSCHRG MED/CURRENT MED MERGE: CPT | Performed by: INTERNAL MEDICINE

## 2023-11-28 PROCEDURE — G8399 PT W/DXA RESULTS DOCUMENT: HCPCS | Performed by: INTERNAL MEDICINE

## 2023-11-28 PROCEDURE — 99214 OFFICE O/P EST MOD 30 MIN: CPT | Performed by: INTERNAL MEDICINE

## 2023-11-28 PROCEDURE — G8427 DOCREV CUR MEDS BY ELIG CLIN: HCPCS | Performed by: INTERNAL MEDICINE

## 2023-11-28 PROCEDURE — 1123F ACP DISCUSS/DSCN MKR DOCD: CPT | Performed by: INTERNAL MEDICINE

## 2023-11-28 PROCEDURE — G8484 FLU IMMUNIZE NO ADMIN: HCPCS | Performed by: INTERNAL MEDICINE

## 2023-11-28 RX ORDER — DOCUSATE SODIUM 100 MG/1
100 CAPSULE, LIQUID FILLED ORAL 2 TIMES DAILY
COMMUNITY

## 2023-11-28 RX ORDER — SODIUM, POTASSIUM,MAG SULFATES 17.5-3.13G
SOLUTION, RECONSTITUTED, ORAL ORAL
Qty: 354 ML | Refills: 0 | Status: SHIPPED | OUTPATIENT
Start: 2023-11-28

## 2023-11-28 NOTE — PROGRESS NOTES
Gastroenterology Clinic Follow up Visit    Salud Pelaez  [id number]  Chief Complaint   Patient presents with    Follow-up     HPI: 77 y.o. female following up after last GI clinic on 3/1/2023. S/p colonoscopy on 2/20/2023 with multiple polyps. Interval change: Patient report significant movement and bowel symptoms, not requiring stool softeners intermittently facilitate bowel movement, both diarrhea and constipation appear to have improved. Denies any blood in the stool. Feels significantly better with respect to her back symptoms after the back surgery. Denies any upper GI symptoms    HPI at last GI clinic visit on 3/1/2023 summarized below:  Patient reports doing well since her procedure. States she was taking MiraLAX daily prior to her colonoscopy, however stopped this as this was causing her to have diarrheal symptoms. Currently, she is back to having hard stools associated with straining. She denies GERD symptoms, nausea/vomiting, hematemesis, dysphagia, abdominal pain, hematochezia, melena or weight loss. HPI from last GI clinic visit on 1/3/2023  summarized below:  65 y.o. female presents to the clinic with longstanding symptoms of alternating diarrhea and constipation, she reports having symptoms for many years. She additionally reports GI issues in many of her family members including history of colon cancer in her mother in her 80s. Patient s/p colonoscopy 7 years ago with findings of polyps. She reports symptoms of abdominal bloating, history of diverticulosis. With regards to her bowel movements she has small hard stools on medication. She has intermittent loose bowel movements. But mostly tendency towards constipation she denies any blood in the stool. She has had significant weight gain over the last few years, reports 20 pounds of weight gain over the last 4 years.      Previous GI work up/Endoscopic investigations:   Colonoscopy 2/20/23: One 15 mm polyp in the ascending colon,

## 2023-11-30 NOTE — PROGRESS NOTES
Patient Name: Hue Anders : 1945        Date: 2023      Type of Appt: Post Op    Reason for appt: POST-OP  L2, L3, L4, and L5 laminectomies, medial facetectomies and   bilateral foraminotomies     Pt last seen by Dr Ambrosio Mckeon on 10/23/2023  2023 POST OP WITH Chepe Flores CNP    Surgeries: 2023 L2, L3, L4, and L5 laminectomies, medial facetectomies and   bilateral foraminotomies

## 2023-12-06 ENCOUNTER — OFFICE VISIT (OUTPATIENT)
Dept: NEUROSURGERY | Age: 78
End: 2023-12-06

## 2023-12-06 VITALS — WEIGHT: 222 LBS | BODY MASS INDEX: 36.99 KG/M2 | HEIGHT: 65 IN

## 2023-12-06 DIAGNOSIS — M48.062 SPINAL STENOSIS OF LUMBAR REGION WITH NEUROGENIC CLAUDICATION: Primary | ICD-10-CM

## 2023-12-06 PROCEDURE — 99024 POSTOP FOLLOW-UP VISIT: CPT | Performed by: NEUROLOGICAL SURGERY

## 2023-12-06 NOTE — PROGRESS NOTES
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 performed by Christ Hernandez MD at [facility] N/A 2019    Primary repair        Home Medications:   Prior to Admission medications    Medication Sig Start Date End Date Taking?  Authorizing Provider   docusate sodium (COLACE) 100 MG capsule Take 1 capsule by mouth 2 times daily   Yes Hung Hernandez MD   sodium-potassium-mag sulfate (SUPREP) 17.5-3.13-1.6 GM/177ML SOLN solution As directed 23  Yes Stan Hurst MD   ADVAIR DISKUS 500-50 MCG/ACT AEPB diskus inhaler USE 1 INHALATION ORALLY IN THE MORNING AND IN THE     EVENING 10/23/23  Yes Ion Cartwright MD   doxazosin (CARDURA) 4 MG tablet TAKE 1 TABLET NIGHTLY 10/17/23  Yes Molly French, APRN - CNP   carvedilol (COREG) 25 MG tablet Take 1 tablet by mouth with breakfast and with evening meal 23  Yes Ion Cartwright MD   DULoxetine (CYMBALTA) 60 MG extended release capsule Take 1 capsule by mouth daily 23  Yes Ion Cartwright MD   ezetimibe (ZETIA) 10 MG tablet Take 1

## 2023-12-06 NOTE — PROGRESS NOTES
NEUROSURGERY and SPINE POST-OP NOTE      Patient Name: Sulma Johns  Patient : 1945  MRN: [id number]       PCP: Sidney Garcia MD      History of Present Ilness: She tells me she is doing great and has had complete resolution of her back and leg symptoms. She is off of pain medications and has been increasing her activities. She feels she does not need to do physical therapy. 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 performed by Delfin Ellis MD at [facility] N/A 2019    Primary repair        Home Medications:   Prior to Admission medications    Medication Sig Start Date End Date Taking?  Authorizing Provider   docusate sodium (COLACE) 100 MG capsule Take 1 capsule by mouth 2 times daily   Yes ProviderHung MD   sodium-potassium-mag sulfate (SUPREP) 17.5-3.13-1.6 GM/177ML SOLN solution As directed 23  Yes Quinten Atwood MD   ADVAIR DISKUS 500-50 MCG/ACT AEPB diskus inhaler USE 1 INHALATION ORALLY IN THE MORNING AND IN THE     EVENING 10/23/23  Yes Sidney Garcia MD   doxazosin (CARDURA) 4 MG tablet TAKE 1 TABLET NIGHTLY 10/17/23  Yes Molly French, APRN - CNP   carvedilol (COREG) 25 MG tablet Take 1 tablet by mouth with breakfast

## 2024-01-30 ENCOUNTER — OFFICE VISIT (OUTPATIENT)
Dept: FAMILY MEDICINE CLINIC | Age: 79
End: 2024-01-30
Payer: MEDICARE

## 2024-01-30 VITALS
DIASTOLIC BLOOD PRESSURE: 80 MMHG | HEIGHT: 65 IN | SYSTOLIC BLOOD PRESSURE: 120 MMHG | WEIGHT: 230 LBS | HEART RATE: 70 BPM | TEMPERATURE: 97.6 F | BODY MASS INDEX: 38.32 KG/M2 | OXYGEN SATURATION: 95 %

## 2024-01-30 DIAGNOSIS — J20.9 ACUTE BRONCHITIS, UNSPECIFIED ORGANISM: Primary | ICD-10-CM

## 2024-01-30 LAB
INFLUENZA A ANTIBODY: NORMAL
INFLUENZA B ANTIBODY: NORMAL
Lab: NORMAL
PERFORMING INSTRUMENT: NORMAL
QC PASS/FAIL: NORMAL
SARS-COV-2, POC: NORMAL

## 2024-01-30 PROCEDURE — G8427 DOCREV CUR MEDS BY ELIG CLIN: HCPCS | Performed by: FAMILY MEDICINE

## 2024-01-30 PROCEDURE — 3079F DIAST BP 80-89 MM HG: CPT | Performed by: FAMILY MEDICINE

## 2024-01-30 PROCEDURE — 1123F ACP DISCUSS/DSCN MKR DOCD: CPT | Performed by: FAMILY MEDICINE

## 2024-01-30 PROCEDURE — 1036F TOBACCO NON-USER: CPT | Performed by: FAMILY MEDICINE

## 2024-01-30 PROCEDURE — 1090F PRES/ABSN URINE INCON ASSESS: CPT | Performed by: FAMILY MEDICINE

## 2024-01-30 PROCEDURE — G8417 CALC BMI ABV UP PARAM F/U: HCPCS | Performed by: FAMILY MEDICINE

## 2024-01-30 PROCEDURE — 99213 OFFICE O/P EST LOW 20 MIN: CPT | Performed by: FAMILY MEDICINE

## 2024-01-30 PROCEDURE — 3074F SYST BP LT 130 MM HG: CPT | Performed by: FAMILY MEDICINE

## 2024-01-30 PROCEDURE — 87804 INFLUENZA ASSAY W/OPTIC: CPT | Performed by: FAMILY MEDICINE

## 2024-01-30 PROCEDURE — 87426 SARSCOV CORONAVIRUS AG IA: CPT | Performed by: FAMILY MEDICINE

## 2024-01-30 PROCEDURE — G8399 PT W/DXA RESULTS DOCUMENT: HCPCS | Performed by: FAMILY MEDICINE

## 2024-01-30 PROCEDURE — G8484 FLU IMMUNIZE NO ADMIN: HCPCS | Performed by: FAMILY MEDICINE

## 2024-01-30 RX ORDER — AZITHROMYCIN 250 MG/1
250 TABLET, FILM COATED ORAL SEE ADMIN INSTRUCTIONS
Qty: 6 TABLET | Refills: 0 | Status: SHIPPED | OUTPATIENT
Start: 2024-01-30 | End: 2024-02-04

## 2024-01-30 RX ORDER — EZETIMIBE 10 MG/1
10 TABLET ORAL DAILY
Qty: 90 TABLET | Refills: 1 | Status: SHIPPED | OUTPATIENT
Start: 2024-01-30

## 2024-01-30 RX ORDER — PREDNISONE 10 MG/1
TABLET ORAL
Qty: 30 TABLET | Refills: 0 | Status: SHIPPED | OUTPATIENT
Start: 2024-01-30

## 2024-01-30 RX ORDER — ALBUTEROL SULFATE 2.5 MG/3ML
2.5 SOLUTION RESPIRATORY (INHALATION) EVERY 6 HOURS PRN
Qty: 120 EACH | Refills: 0 | Status: SHIPPED | OUTPATIENT
Start: 2024-01-30

## 2024-01-30 ASSESSMENT — PATIENT HEALTH QUESTIONNAIRE - PHQ9
2. FEELING DOWN, DEPRESSED OR HOPELESS: 1
7. TROUBLE CONCENTRATING ON THINGS, SUCH AS READING THE NEWSPAPER OR WATCHING TELEVISION: 0
SUM OF ALL RESPONSES TO PHQ QUESTIONS 1-9: 2
5. POOR APPETITE OR OVEREATING: 0
SUM OF ALL RESPONSES TO PHQ QUESTIONS 1-9: 2
SUM OF ALL RESPONSES TO PHQ QUESTIONS 1-9: 2
9. THOUGHTS THAT YOU WOULD BE BETTER OFF DEAD, OR OF HURTING YOURSELF: 0
1. LITTLE INTEREST OR PLEASURE IN DOING THINGS: 1
4. FEELING TIRED OR HAVING LITTLE ENERGY: 0
3. TROUBLE FALLING OR STAYING ASLEEP: 0
6. FEELING BAD ABOUT YOURSELF - OR THAT YOU ARE A FAILURE OR HAVE LET YOURSELF OR YOUR FAMILY DOWN: 0
8. MOVING OR SPEAKING SO SLOWLY THAT OTHER PEOPLE COULD HAVE NOTICED. OR THE OPPOSITE, BEING SO FIGETY OR RESTLESS THAT YOU HAVE BEEN MOVING AROUND A LOT MORE THAN USUAL: 0
SUM OF ALL RESPONSES TO PHQ9 QUESTIONS 1 & 2: 2
10. IF YOU CHECKED OFF ANY PROBLEMS, HOW DIFFICULT HAVE THESE PROBLEMS MADE IT FOR YOU TO DO YOUR WORK, TAKE CARE OF THINGS AT HOME, OR GET ALONG WITH OTHER PEOPLE: 0
SUM OF ALL RESPONSES TO PHQ QUESTIONS 1-9: 2

## 2024-01-30 ASSESSMENT — ENCOUNTER SYMPTOMS: ABDOMINAL PAIN: 0

## 2024-01-30 NOTE — PROGRESS NOTES
Subjective:      Patient ID: Yisel Dai is a 78 y.o. female    Chest Congestion  Associated symptoms include congestion. Pertinent negatives include no abdominal pain, chills, rash or weakness.     Here with 3 weeks of cough.   Cough is mostly dry.  No sore throat or nasal congestion.  No fever.  No vomiting but had some diarrhea over the weekend-resolved currently.  No covid testing done in last few weeks. Has felt some wheezing intermittently during this time     Review of Systems   Constitutional:  Negative for chills.   HENT:  Positive for congestion.    Gastrointestinal:  Negative for abdominal pain.   Skin:  Negative for rash.   Neurological:  Negative for weakness.     Reviewed allergy, medical, social, surgical, family and med list changes and updated   Files     Social History     Socioeconomic History    Marital status:    Tobacco Use    Smoking status: Never    Smokeless tobacco: Never   Vaping Use    Vaping Use: Never used   Substance and Sexual Activity    Alcohol use: No    Drug use: No   Social History Narrative    ** Merged History Encounter **          Social Determinants of Health     Financial Resource Strain: Low Risk  (5/12/2023)    Overall Financial Resource Strain (CARDIA)     Difficulty of Paying Living Expenses: Not hard at all   Transportation Needs: Unknown (5/12/2023)    PRAPARE - Transportation     Lack of Transportation (Non-Medical): No   Physical Activity: Inactive (11/14/2022)    Exercise Vital Sign     Days of Exercise per Week: 0 days     Minutes of Exercise per Session: 10 min   Stress: Stress Concern Present (5/20/2021)    [facility] - Occupational Stress Questionnaire     Feeling of Stress : Very much   Housing Stability: Unknown (5/12/2023)    Housing Stability Vital Sign     Unstable Housing in the Last Year: No     Current Outpatient Medications   Medication Sig Dispense Refill    docusate sodium (COLACE) 100 MG capsule Take 1 capsule

## 2024-02-01 ENCOUNTER — PREP FOR PROCEDURE (OUTPATIENT)
Dept: GASTROENTEROLOGY | Age: 79
End: 2024-02-01

## 2024-02-01 RX ORDER — SODIUM CHLORIDE 0.9 % (FLUSH) 0.9 %
5-40 SYRINGE (ML) INJECTION EVERY 12 HOURS SCHEDULED
Status: CANCELLED | OUTPATIENT
Start: 2024-02-01

## 2024-02-01 RX ORDER — SODIUM CHLORIDE 9 MG/ML
INJECTION, SOLUTION INTRAVENOUS CONTINUOUS
Status: CANCELLED | OUTPATIENT
Start: 2024-02-01

## 2024-02-01 RX ORDER — SODIUM CHLORIDE 9 MG/ML
INJECTION, SOLUTION INTRAVENOUS PRN
Status: CANCELLED | OUTPATIENT
Start: 2024-02-01

## 2024-02-01 RX ORDER — SODIUM CHLORIDE 0.9 % (FLUSH) 0.9 %
5-40 SYRINGE (ML) INJECTION PRN
Status: CANCELLED | OUTPATIENT
Start: 2024-02-01

## 2024-02-05 ENCOUNTER — ANESTHESIA (OUTPATIENT)
Dept: ENDOSCOPY | Age: 79
End: 2024-02-05
Payer: MEDICARE

## 2024-02-05 ENCOUNTER — HOSPITAL ENCOUNTER (OUTPATIENT)
Age: 79
Setting detail: OUTPATIENT SURGERY
Discharge: HOME | End: 2024-02-05
Attending: INTERNAL MEDICINE | Admitting: INTERNAL MEDICINE
Payer: MEDICARE

## 2024-02-05 ENCOUNTER — ANESTHESIA EVENT (OUTPATIENT)
Dept: ENDOSCOPY | Age: 79
End: 2024-02-05
Payer: MEDICARE

## 2024-02-05 VITALS
HEIGHT: 65 IN | TEMPERATURE: 98 F | BODY MASS INDEX: 38.32 KG/M2 | OXYGEN SATURATION: 93 % | HEART RATE: 63 BPM | WEIGHT: 230 LBS | SYSTOLIC BLOOD PRESSURE: 203 MMHG | DIASTOLIC BLOOD PRESSURE: 88 MMHG | RESPIRATION RATE: 18 BRPM

## 2024-02-05 DIAGNOSIS — Z86.010 HISTORY OF COLON POLYPS: ICD-10-CM

## 2024-02-05 DIAGNOSIS — Z80.0 FAMILY HISTORY OF COLON CANCER IN MOTHER: ICD-10-CM

## 2024-02-05 DIAGNOSIS — R19.8 ALTERNATING CONSTIPATION AND DIARRHEA: ICD-10-CM

## 2024-02-05 PROCEDURE — 2580000003 HC RX 258: Performed by: INTERNAL MEDICINE

## 2024-02-05 PROCEDURE — 6360000002 HC RX W HCPCS: Performed by: NURSE ANESTHETIST, CERTIFIED REGISTERED

## 2024-02-05 PROCEDURE — 2709999900 HC NON-CHARGEABLE SUPPLY: Performed by: INTERNAL MEDICINE

## 2024-02-05 PROCEDURE — 7100000010 HC PHASE II RECOVERY - FIRST 15 MIN: Performed by: INTERNAL MEDICINE

## 2024-02-05 PROCEDURE — 88305 TISSUE EXAM BY PATHOLOGIST: CPT

## 2024-02-05 PROCEDURE — 3700000001 HC ADD 15 MINUTES (ANESTHESIA): Performed by: INTERNAL MEDICINE

## 2024-02-05 PROCEDURE — 2580000003 HC RX 258

## 2024-02-05 PROCEDURE — 3700000000 HC ANESTHESIA ATTENDED CARE: Performed by: INTERNAL MEDICINE

## 2024-02-05 PROCEDURE — 3609027000 HC COLONOSCOPY: Performed by: INTERNAL MEDICINE

## 2024-02-05 PROCEDURE — 6370000000 HC RX 637 (ALT 250 FOR IP): Performed by: INTERNAL MEDICINE

## 2024-02-05 RX ORDER — SIMETHICONE 20 MG/.3ML
EMULSION ORAL PRN
Status: DISCONTINUED | OUTPATIENT
Start: 2024-02-05 | End: 2024-02-05 | Stop reason: ALTCHOICE

## 2024-02-05 RX ORDER — MAGNESIUM HYDROXIDE 1200 MG/15ML
LIQUID ORAL PRN
Status: DISCONTINUED | OUTPATIENT
Start: 2024-02-05 | End: 2024-02-05 | Stop reason: ALTCHOICE

## 2024-02-05 RX ORDER — SODIUM CHLORIDE 0.9 % (FLUSH) 0.9 %
5-40 SYRINGE (ML) INJECTION EVERY 12 HOURS SCHEDULED
Status: DISCONTINUED | OUTPATIENT
Start: 2024-02-05 | End: 2024-02-05 | Stop reason: HOSPADM

## 2024-02-05 RX ORDER — ENEMA 19; 7 G/133ML; G/133ML
1 ENEMA RECTAL
Status: COMPLETED | OUTPATIENT
Start: 2024-02-05 | End: 2024-02-05

## 2024-02-05 RX ORDER — SODIUM CHLORIDE 9 MG/ML
INJECTION, SOLUTION INTRAVENOUS CONTINUOUS
Status: DISCONTINUED | OUTPATIENT
Start: 2024-02-05 | End: 2024-02-05 | Stop reason: HOSPADM

## 2024-02-05 RX ORDER — SODIUM CHLORIDE 0.9 % (FLUSH) 0.9 %
5-40 SYRINGE (ML) INJECTION PRN
Status: DISCONTINUED | OUTPATIENT
Start: 2024-02-05 | End: 2024-02-05 | Stop reason: HOSPADM

## 2024-02-05 RX ORDER — PROPOFOL 10 MG/ML
INJECTION, EMULSION INTRAVENOUS PRN
Status: DISCONTINUED | OUTPATIENT
Start: 2024-02-05 | End: 2024-02-05 | Stop reason: SDUPTHER

## 2024-02-05 RX ORDER — SODIUM CHLORIDE 9 MG/ML
INJECTION, SOLUTION INTRAVENOUS
Status: COMPLETED
Start: 2024-02-05 | End: 2024-02-05

## 2024-02-05 RX ORDER — SODIUM CHLORIDE 9 MG/ML
INJECTION, SOLUTION INTRAVENOUS PRN
Status: DISCONTINUED | OUTPATIENT
Start: 2024-02-05 | End: 2024-02-05 | Stop reason: HOSPADM

## 2024-02-05 RX ADMIN — PROPOFOL 250 MG: 10 INJECTION, EMULSION INTRAVENOUS at 09:45

## 2024-02-05 RX ADMIN — SODIUM PHOSPHATE, DIBASIC AND SODIUM PHOSPHATE, MONOBASIC 1 ENEMA: 7; 19 ENEMA RECTAL at 08:59

## 2024-02-05 RX ADMIN — SODIUM CHLORIDE: 9 INJECTION, SOLUTION INTRAVENOUS at 08:53

## 2024-02-05 ASSESSMENT — PAIN - FUNCTIONAL ASSESSMENT
PAIN_FUNCTIONAL_ASSESSMENT: NONE - DENIES PAIN
PAIN_FUNCTIONAL_ASSESSMENT: 0-10
PAIN_FUNCTIONAL_ASSESSMENT: NONE - DENIES PAIN

## 2024-02-05 NOTE — H&P
Patient Name: Yisel Dai  : 1945  MRN: [id number]  DATE: 24      ENDOSCOPY  History and Physical    Procedure:    [] Diagnostic Colonoscopy       [x] Screening Colonoscopy  [] EGD      [] ERCP      [] EUS       [] Other    [x] Previous office notes/History and Physical reviewed from the patients chart. Please see EMR for further details of HPI. I have examined the patient's status immediately prior to the procedure and:      Indications/HPI:    []Abdominal Pain   []Cancer- GI/Lung  [x]Fhx of colon CA  []History of Polyps   []Graff’s   []Melena  []Abnormal Imaging   []Dysphagia    []Persistent Pneumonia  []Anemia   []Food Impaction  [x]History of Polyps  []GI Bleed   []Pulmonary nodule/Mass  []Change in bowel habits  []Heartburn/Reflux  []Rectal Bleed (BRBPR)  []Chest Pain - Non Cardiac  []Heme (+) Stool  []Ulcers  []Constipation   []Hemoptysis   []Varices  []Diarrhea   []Hypoxemia  []Nausea/Vomiting   []Screening   []Crohns/Colitis  [x]Other: Suboptimal prep on previous colonoscopy    Anesthesia:   [x] MAC [] Moderate Sedation   [] General   [] None     ROS: 12 pt Review of Symptoms was negative unless mentioned above    Medications:   Prior to Admission medications    Medication Sig Start Date End Date Taking? Authorizing Provider   albuterol (PROVENTIL) (2.5 MG/3ML) 0.083% nebulizer solution Take 3 mLs by nebulization every 6 hours as needed for Wheezing 24   Yadiel Miramontes MD   predniSONE (DELTASONE) 10 MG tablet 40mg daily x 4d, 30mg daily x 3d, 20mg x 2d, 10mg x 1d, then d/c 24   Yadiel Miramontes MD   ezetimibe (ZETIA) 10 MG tablet Take 1 tablet by mouth daily 24   Yadiel Miramontes MD   docusate sodium (COLACE) 100 MG capsule Take 1 capsule by mouth 2 times daily    ProviderHung MD   sodium-potassium-mag sulfate (SUPREP) 17.5-3.13-1.6 GM/177ML SOLN solution As directed 23   Dean Sparrow MD   ADVAIR DISKUS 500-50 MCG/ACT AEPB diskus inhaler USE 1 INHALATION

## 2024-02-05 NOTE — ANESTHESIA PRE PROCEDURE
Department of Anesthesiology  Preprocedure Note       Name:  Yisel Dai   Age:  78 y.o.  :  1945                                          MRN:  [id number]         Date:  2024      Surgeon: Surgeon(s):  Dean Sparrow MD    Procedure: Procedure(s):  COLONOSCOPY DIAGNOSTIC    Medications prior to admission:   Prior to Admission medications    Medication Sig Start Date End Date Taking? Authorizing Provider   albuterol (PROVENTIL) (2.5 MG/3ML) 0.083% nebulizer solution Take 3 mLs by nebulization every 6 hours as needed for Wheezing 24   Yadiel Miramontes MD   predniSONE (DELTASONE) 10 MG tablet 40mg daily x 4d, 30mg daily x 3d, 20mg x 2d, 10mg x 1d, then d/c 24   Yadiel Miramontes MD   ezetimibe (ZETIA) 10 MG tablet Take 1 tablet by mouth daily 24   Yadiel Miramontes MD   docusate sodium (COLACE) 100 MG capsule Take 1 capsule by mouth 2 times daily    ProviderHung MD   sodium-potassium-mag sulfate (SUPREP) 17.5-3.13-1.6 GM/177ML SOLN solution As directed 23   Dean Sparrow MD   ADVAIR DISKUS 500-50 MCG/ACT AEPB diskus inhaler USE 1 INHALATION ORALLY IN THE MORNING AND IN THE     EVENING 10/23/23   Yadiel Miramontes MD   doxazosin (CARDURA) 4 MG tablet TAKE 1 TABLET NIGHTLY 10/17/23   Molly French APRN - CNP   carvedilol (COREG) 25 MG tablet Take 1 tablet by mouth with breakfast and with evening meal 23   Yadiel Miramontes MD   DULoxetine (CYMBALTA) 60 MG extended release capsule Take 1 capsule by mouth daily 23   Yadiel Miramontes MD   linaCLOtide (LINZESS) 72 MCG CAPS capsule Take 1 capsule by mouth every morning (before breakfast) 3/9/23   Leia Miner APRN - CNP   Handicap Placard MISC by Does not apply route Start 3-17-23 to 3-17-24 Dx  Chronic back pain 23   Yadiel Miramontes MD   psyllium (METAMUCIL SMOOTH TEXTURE) 58.6 % powder Take 2 teaspoon with 8 to 10 oz water. 1/3/23   Dean Sparrow MD   Handicap Placard MISC by Does not apply route

## 2024-02-05 NOTE — ANESTHESIA POSTPROCEDURE EVALUATION
Department of Anesthesiology  Postprocedure Note    Patient: Yisel Dai  MRN: [id number]  YOB: 1945  Date of evaluation: 2/5/2024    Procedure Summary     Date: 02/05/24 Room / Location: [facility] OR 02 / [facility]    Anesthesia Start: 0942 Anesthesia Stop:     Procedure: COLONOSCOPY WITH POLYPECTOMY Diagnosis:       History of colon polyps      Family history of colon cancer in mother      Alternating constipation and diarrhea      (History of colon polyps [Z86.010])      (Family history of colon cancer in mother [Z80.0])      (Alternating constipation and diarrhea [R19.8])    Surgeons: Dean Sparrow MD Responsible Provider: Ludy Shahid APRN - CRNA    Anesthesia Type: MAC ASA Status: 3          Anesthesia Type: No value filed.    Vandana Phase I: Vandana Score: 10    Vandana Phase II:      Anesthesia Post Evaluation    Patient location during evaluation: bedside  Patient participation: complete - patient participated  Level of consciousness: awake and awake and alert  Pain score: 0  Airway patency: patent  Nausea & Vomiting: no nausea and no vomiting  Cardiovascular status: blood pressure returned to baseline and hemodynamically stable  Respiratory status: acceptable and spontaneous ventilation  Hydration status: euvolemic  Pain management: adequate        No notable events documented.

## 2024-02-06 ENCOUNTER — TELEMEDICINE (OUTPATIENT)
Dept: FAMILY MEDICINE CLINIC | Age: 79
End: 2024-02-06
Payer: MEDICARE

## 2024-02-06 ENCOUNTER — TELEPHONE (OUTPATIENT)
Dept: FAMILY MEDICINE CLINIC | Age: 79
End: 2024-02-06

## 2024-02-06 DIAGNOSIS — Z00.00 MEDICARE ANNUAL WELLNESS VISIT, SUBSEQUENT: Primary | ICD-10-CM

## 2024-02-06 DIAGNOSIS — R26.81 UNSTEADY GAIT: ICD-10-CM

## 2024-02-06 DIAGNOSIS — R29.6 FREQUENT FALLS: ICD-10-CM

## 2024-02-06 DIAGNOSIS — E66.01 SEVERE OBESITY (BMI 35.0-39.9) WITH COMORBIDITY (HCC): ICD-10-CM

## 2024-02-06 PROCEDURE — G8484 FLU IMMUNIZE NO ADMIN: HCPCS | Performed by: NURSE PRACTITIONER

## 2024-02-06 PROCEDURE — G0439 PPPS, SUBSEQ VISIT: HCPCS | Performed by: NURSE PRACTITIONER

## 2024-02-06 PROCEDURE — 1123F ACP DISCUSS/DSCN MKR DOCD: CPT | Performed by: NURSE PRACTITIONER

## 2024-02-06 ASSESSMENT — PATIENT HEALTH QUESTIONNAIRE - PHQ9
5. POOR APPETITE OR OVEREATING: 0
9. THOUGHTS THAT YOU WOULD BE BETTER OFF DEAD, OR OF HURTING YOURSELF: 0
2. FEELING DOWN, DEPRESSED OR HOPELESS: 0
SUM OF ALL RESPONSES TO PHQ QUESTIONS 1-9: 0
1. LITTLE INTEREST OR PLEASURE IN DOING THINGS: 0
SUM OF ALL RESPONSES TO PHQ QUESTIONS 1-9: 0
3. TROUBLE FALLING OR STAYING ASLEEP: 0
10. IF YOU CHECKED OFF ANY PROBLEMS, HOW DIFFICULT HAVE THESE PROBLEMS MADE IT FOR YOU TO DO YOUR WORK, TAKE CARE OF THINGS AT HOME, OR GET ALONG WITH OTHER PEOPLE: 0
7. TROUBLE CONCENTRATING ON THINGS, SUCH AS READING THE NEWSPAPER OR WATCHING TELEVISION: 0
SUM OF ALL RESPONSES TO PHQ QUESTIONS 1-9: 0
SUM OF ALL RESPONSES TO PHQ QUESTIONS 1-9: 0
6. FEELING BAD ABOUT YOURSELF - OR THAT YOU ARE A FAILURE OR HAVE LET YOURSELF OR YOUR FAMILY DOWN: 0
SUM OF ALL RESPONSES TO PHQ9 QUESTIONS 1 & 2: 0
4. FEELING TIRED OR HAVING LITTLE ENERGY: 0
8. MOVING OR SPEAKING SO SLOWLY THAT OTHER PEOPLE COULD HAVE NOTICED. OR THE OPPOSITE, BEING SO FIGETY OR RESTLESS THAT YOU HAVE BEEN MOVING AROUND A LOT MORE THAN USUAL: 0

## 2024-02-06 NOTE — PATIENT INSTRUCTIONS
can start by adding one healthy food to your meals each day.    Try to make half your plate fruits and vegetables, one-fourth whole grains, and one-fourth lean proteins. Try including dairy with your meals.   Eat more fruits and vegetables. Try to have them with most meals and snacks.   Foods for healthy eating    Fruits    These can be fresh, frozen, canned, or dried.  Try to choose whole fruit rather than fruit juice.  Eat a variety of colors.    Vegetables    These can be fresh, frozen, canned, or dried.  Beans, peas, and lentils count too.    Whole grains    Choose whole-grain breads, cereals, and noodles.  Try brown rice.    Lean proteins    These can include lean meat, poultry, fish, and eggs.  You can also have tofu, beans, peas, lentils, nuts, and seeds.    Dairy    Try milk, yogurt, and cheese.  Choose low-fat or fat-free when you can.  If you need to, use lactose-free milk or fortified plant-based milk products, such as soy milk.    Water    Drink water when you're thirsty.  Limit sugar-sweetened drinks, including soda, fruit drinks, and sports drinks.  Where can you learn more?  Go to https://www.[employer].net/patientEd and enter T756 to learn more about \"Eating Healthy Foods: Care Instructions.\"  Current as of: September 20, 2023               Content Version: 13.9  © 1195-3853 [employer].   Care instructions adapted under license by [employer]. If you have questions about a medical condition or this instruction, always ask your healthcare professional. [employer] disclaims any warranty or liability for your use of this information.           Starting a Weight Loss Plan: Care Instructions  Overview     If you're thinking about losing weight, it can be hard to know where to start. Your doctor can help you set up a weight loss plan that best meets your needs. You may want to take a class on nutrition or exercise, or you could join a weight loss support group. If you have

## 2024-02-06 NOTE — PROGRESS NOTES
Medicare Annual Wellness Visit    Yisel Dai is here for Medicare AWV    Assessment & Plan   Medicare annual wellness visit, subsequent  Frequent falls  -     Ambulatory referral to Home Health  Severe obesity (BMI 35.0-39.9) with comorbidity (HCC)  -     Ambulatory referral to Home Health  Unsteady gait  -     Ambulatory referral to Home Health    Recommendations for Preventive Services Due: see orders and patient instructions/AVS.  Recommended screening schedule for the next 5-10 years is provided to the patient in written form: see Patient Instructions/AVS.     Return in 1 year (on 2/6/2025).     Subjective       Patient's complete Health Risk Assessment and screening values have been reviewed and are found in Flowsheets. The following problems were reviewed today and where indicated follow up appointments were made and/or referrals ordered.    Positive Risk Factor Screenings with Interventions:    Fall Risk:  Do you feel unsteady or are you worried about falling? : (!) yes  2 or more falls in past year?: (!) yes  Fall with injury in past year?: (!) yes     Interventions:    Reviewed medications, home hazards, visual acuity, and co-morbidities that can increase risk for falls  Referral: Physical Therapy (PT)             Activity, Diet, and Weight:  On average, how many days per week do you engage in moderate to strenuous exercise (like a brisk walk)?: 0 days  On average, how many minutes do you engage in exercise at this level?: 0 min    Do you eat balanced/healthy meals regularly?: (!) No    There is no height or weight on file to calculate BMI. (!) Abnormal      Inactivity Interventions:  Referral made to PT  Do you eat balanced/healthy meals regularly Interventions:  Patient declines any further evaluation or treatment  Obesity Interventions:  Referral made to PT                ADL's:   Patient reports needing help with:  Select all that apply: (!) Walking/Balance  Select all that apply: (!)

## 2024-02-06 NOTE — TELEPHONE ENCOUNTER
Inga from [facility] calling about Ambulatory Referral [facility] Order needs DX code changed for falls code used is not allowed for medicare    [phone]

## 2024-03-18 ENCOUNTER — TELEPHONE (OUTPATIENT)
Dept: FAMILY MEDICINE CLINIC | Age: 79
End: 2024-03-18

## 2024-03-18 DIAGNOSIS — R26.81 UNSTEADY GAIT: Primary | ICD-10-CM

## 2024-03-18 DIAGNOSIS — I10 ESSENTIAL HYPERTENSION: ICD-10-CM

## 2024-03-18 DIAGNOSIS — G89.29 CHRONIC BACK PAIN, UNSPECIFIED BACK LOCATION, UNSPECIFIED BACK PAIN LATERALITY: ICD-10-CM

## 2024-03-18 DIAGNOSIS — M54.9 CHRONIC BACK PAIN, UNSPECIFIED BACK LOCATION, UNSPECIFIED BACK PAIN LATERALITY: ICD-10-CM

## 2024-03-18 NOTE — TELEPHONE ENCOUNTER
Patient's spouse came to the office requesting a new order for the patient's disability placard. Please call the patient at [phone] when the order is ready for .

## 2024-03-20 ENCOUNTER — OFFICE VISIT (OUTPATIENT)
Dept: FAMILY MEDICINE CLINIC | Age: 79
End: 2024-03-20

## 2024-03-20 VITALS
SYSTOLIC BLOOD PRESSURE: 128 MMHG | HEIGHT: 65 IN | HEART RATE: 80 BPM | DIASTOLIC BLOOD PRESSURE: 86 MMHG | TEMPERATURE: 97.4 F | OXYGEN SATURATION: 96 % | WEIGHT: 230 LBS | BODY MASS INDEX: 38.32 KG/M2

## 2024-03-20 DIAGNOSIS — R32 URINARY INCONTINENCE, UNSPECIFIED TYPE: ICD-10-CM

## 2024-03-20 DIAGNOSIS — N30.01 ACUTE CYSTITIS WITH HEMATURIA: Primary | ICD-10-CM

## 2024-03-20 PROBLEM — F33.2 SEVERE EPISODE OF RECURRENT MAJOR DEPRESSIVE DISORDER, WITHOUT PSYCHOTIC FEATURES (HCC): Status: RESOLVED | Noted: 2022-09-07 | Resolved: 2024-03-20

## 2024-03-20 RX ORDER — CARVEDILOL 25 MG/1
25 TABLET ORAL 2 TIMES DAILY WITH MEALS
Qty: 180 TABLET | Refills: 1 | Status: SHIPPED | OUTPATIENT
Start: 2024-03-20

## 2024-03-20 RX ORDER — SULFAMETHOXAZOLE AND TRIMETHOPRIM 800; 160 MG/1; MG/1
1 TABLET ORAL 2 TIMES DAILY
Qty: 20 TABLET | Refills: 0 | Status: SHIPPED | OUTPATIENT
Start: 2024-03-20 | End: 2024-03-30

## 2024-03-20 NOTE — PROGRESS NOTES
Subjective  Yisel Dai 1945 is a 78 y.o. female who presents today with:  Chief Complaint   Patient presents with    Incontinence     C/O urinary incontinence for the past two weeks. Denies fevers, chills, night sweats or burning with urination. She does have some flank pain, abdominal pain, or foul smelling urine.        Incontinence      I have reviewed HPI and agree with above. Had a gi bug 2 weeks ago.  No diarrhea.    Review of Systems   Genitourinary:  Positive for bladder incontinence.   All other systems reviewed and are negative.      Past Medical History:   Diagnosis Date    Arthritis     Asthma     Chronic back pain Years    Depression Many years    Headache Off and on    Hearing loss Not bad    Hyperlipidemia     Hypertension     Obesity Years    Stroke (cerebrum) (HCC) 08/01/2015    TIA (transient ischemic attack) 2013    Urinary incontinence Off and on     Past Surgical History:   Procedure Laterality Date    APPENDECTOMY      BREAST BIOPSY Right     benign    CATARACT REMOVAL      COLONOSCOPY N/A 02/20/2023    COLONOSCOPY WITH POLYPECTOMY performed by Tramaine Kapoor MD at [facility]    COLONOSCOPY N/A 2/5/2024    COLONOSCOPY WITH POLYPECTOMY performed by Dean Sparrow MD at [facility]    EYE SURGERY Left     HAND SURGERY      RIGHT    HERNIA REPAIR Left 05/23/2019    [facility]    JOINT REPLACEMENT  2019    KNEE ARTHROPLASTY Bilateral     LAMINECTOMY N/A 11/7/2023    L2, L3, L4, and L5 laminectomies, medial facetectomies and bilateral foraminotomies. performed by Lane Bullard MD at [facility] OR    TONSILLECTOMY AND ADENOIDECTOMY      UMBILICAL HERNIA REPAIR N/A 05/23/2019    Primary repair [facility]     Social History     Socioeconomic History    Marital status:      Spouse name: Not on file    Number of children: Not on file    Years of education: Not on file    Highest education level: Not on file   Occupational History    Not on file   Tobacco Use

## 2024-03-21 ENCOUNTER — TELEPHONE (OUTPATIENT)
Dept: FAMILY MEDICINE CLINIC | Age: 79
End: 2024-03-21

## 2024-03-21 DIAGNOSIS — I10 ESSENTIAL HYPERTENSION: ICD-10-CM

## 2024-03-21 DIAGNOSIS — E78.00 PURE HYPERCHOLESTEROLEMIA: ICD-10-CM

## 2024-03-21 DIAGNOSIS — R73.03 PRE-DIABETES: ICD-10-CM

## 2024-03-21 LAB
ALBUMIN SERPL-MCNC: 3.9 G/DL (ref 3.5–4.6)
ALP SERPL-CCNC: 81 U/L (ref 40–130)
ALT SERPL-CCNC: 12 U/L (ref 0–33)
ANION GAP SERPL CALCULATED.3IONS-SCNC: 10 MEQ/L (ref 9–15)
AST SERPL-CCNC: 22 U/L (ref 0–35)
BASOPHILS # BLD: 0.1 K/UL (ref 0–0.2)
BASOPHILS NFR BLD: 0.8 %
BILIRUB SERPL-MCNC: 0.3 MG/DL (ref 0.2–0.7)
BUN SERPL-MCNC: 9 MG/DL (ref 8–23)
CALCIUM SERPL-MCNC: 9 MG/DL (ref 8.5–9.9)
CHLORIDE SERPL-SCNC: 99 MEQ/L (ref 95–107)
CHOLEST SERPL-MCNC: 173 MG/DL (ref 0–199)
CO2 SERPL-SCNC: 30 MEQ/L (ref 20–31)
CREAT SERPL-MCNC: 0.77 MG/DL (ref 0.5–0.9)
EOSINOPHIL # BLD: 0.7 K/UL (ref 0–0.7)
EOSINOPHIL NFR BLD: 10.3 %
ERYTHROCYTE [DISTWIDTH] IN BLOOD BY AUTOMATED COUNT: 14.5 % (ref 11.5–14.5)
GLOBULIN SER CALC-MCNC: 2.8 G/DL (ref 2.3–3.5)
GLUCOSE SERPL-MCNC: 115 MG/DL (ref 70–99)
HBA1C MFR BLD: 6.4 % (ref 4.8–5.9)
HCT VFR BLD AUTO: 39.2 % (ref 37–47)
HDLC SERPL-MCNC: 40 MG/DL (ref 40–59)
HGB BLD-MCNC: 12.6 G/DL (ref 12–16)
LDLC SERPL CALC-MCNC: 96 MG/DL (ref 0–129)
LYMPHOCYTES # BLD: 1.2 K/UL (ref 1–4.8)
LYMPHOCYTES NFR BLD: 19.1 %
MCH RBC QN AUTO: 29.2 PG (ref 27–31.3)
MCHC RBC AUTO-ENTMCNC: 32.1 % (ref 33–37)
MCV RBC AUTO: 90.7 FL (ref 79.4–94.8)
MONOCYTES # BLD: 0.5 K/UL (ref 0.2–0.8)
MONOCYTES NFR BLD: 7.1 %
NEUTROPHILS # BLD: 3.9 K/UL (ref 1.4–6.5)
NEUTS SEG NFR BLD: 62.4 %
PLATELET # BLD AUTO: 183 K/UL (ref 130–400)
POTASSIUM SERPL-SCNC: 3.6 MEQ/L (ref 3.4–4.9)
PROT SERPL-MCNC: 6.7 G/DL (ref 6.3–8)
RBC # BLD AUTO: 4.32 M/UL (ref 4.2–5.4)
SODIUM SERPL-SCNC: 139 MEQ/L (ref 135–144)
TRIGL SERPL-MCNC: 186 MG/DL (ref 0–150)
WBC # BLD AUTO: 6.3 K/UL (ref 4.8–10.8)

## 2024-03-21 NOTE — TELEPHONE ENCOUNTER
Yaritza from [facility] calling in. They sent a fax on 3/18/24 regarding patient - I do not see this scanned in media    They are resending, I confirmed fax number to send

## 2024-03-27 NOTE — TELEPHONE ENCOUNTER
Pt did not request this from [employer]. They keep calling pt and asking her for her information. I spoke to pt and she stated she does not want it done.

## 2024-03-27 NOTE — TELEPHONE ENCOUNTER
Under the impression She wanted it done. Forms were completed and faxed. Inform patient not to get it done.

## 2024-03-29 ENCOUNTER — OFFICE VISIT (OUTPATIENT)
Dept: FAMILY MEDICINE CLINIC | Age: 79
End: 2024-03-29
Payer: MEDICARE

## 2024-03-29 VITALS
DIASTOLIC BLOOD PRESSURE: 88 MMHG | OXYGEN SATURATION: 97 % | SYSTOLIC BLOOD PRESSURE: 124 MMHG | WEIGHT: 226 LBS | HEIGHT: 65 IN | TEMPERATURE: 96.6 F | BODY MASS INDEX: 37.65 KG/M2 | HEART RATE: 66 BPM

## 2024-03-29 DIAGNOSIS — F33.2 SEVERE EPISODE OF RECURRENT MAJOR DEPRESSIVE DISORDER, WITHOUT PSYCHOTIC FEATURES (HCC): ICD-10-CM

## 2024-03-29 DIAGNOSIS — R29.6 FREQUENT FALLS: ICD-10-CM

## 2024-03-29 DIAGNOSIS — I10 ESSENTIAL HYPERTENSION: Primary | ICD-10-CM

## 2024-03-29 DIAGNOSIS — J45.40 MODERATE PERSISTENT ASTHMA WITHOUT COMPLICATION: ICD-10-CM

## 2024-03-29 DIAGNOSIS — E78.00 PURE HYPERCHOLESTEROLEMIA: ICD-10-CM

## 2024-03-29 DIAGNOSIS — R32 URINARY INCONTINENCE, UNSPECIFIED TYPE: ICD-10-CM

## 2024-03-29 DIAGNOSIS — R73.03 PRE-DIABETES: ICD-10-CM

## 2024-03-29 PROCEDURE — 1090F PRES/ABSN URINE INCON ASSESS: CPT | Performed by: FAMILY MEDICINE

## 2024-03-29 PROCEDURE — 3079F DIAST BP 80-89 MM HG: CPT | Performed by: FAMILY MEDICINE

## 2024-03-29 PROCEDURE — 99214 OFFICE O/P EST MOD 30 MIN: CPT | Performed by: FAMILY MEDICINE

## 2024-03-29 PROCEDURE — G8399 PT W/DXA RESULTS DOCUMENT: HCPCS | Performed by: FAMILY MEDICINE

## 2024-03-29 PROCEDURE — 1036F TOBACCO NON-USER: CPT | Performed by: FAMILY MEDICINE

## 2024-03-29 PROCEDURE — G8417 CALC BMI ABV UP PARAM F/U: HCPCS | Performed by: FAMILY MEDICINE

## 2024-03-29 PROCEDURE — 1123F ACP DISCUSS/DSCN MKR DOCD: CPT | Performed by: FAMILY MEDICINE

## 2024-03-29 PROCEDURE — 3074F SYST BP LT 130 MM HG: CPT | Performed by: FAMILY MEDICINE

## 2024-03-29 PROCEDURE — 0509F URINE INCON PLAN DOCD: CPT | Performed by: FAMILY MEDICINE

## 2024-03-29 PROCEDURE — G8484 FLU IMMUNIZE NO ADMIN: HCPCS | Performed by: FAMILY MEDICINE

## 2024-03-29 PROCEDURE — G8427 DOCREV CUR MEDS BY ELIG CLIN: HCPCS | Performed by: FAMILY MEDICINE

## 2024-03-29 ASSESSMENT — ENCOUNTER SYMPTOMS
DIARRHEA: 0
VOMITING: 0
BACK PAIN: 0
COUGH: 0

## 2024-03-29 ASSESSMENT — PATIENT HEALTH QUESTIONNAIRE - PHQ9
SUM OF ALL RESPONSES TO PHQ QUESTIONS 1-9: 0
2. FEELING DOWN, DEPRESSED OR HOPELESS: NOT AT ALL
1. LITTLE INTEREST OR PLEASURE IN DOING THINGS: NOT AT ALL
SUM OF ALL RESPONSES TO PHQ QUESTIONS 1-9: 0
SUM OF ALL RESPONSES TO PHQ9 QUESTIONS 1 & 2: 0

## 2024-03-29 NOTE — PROGRESS NOTES
Subjective:      Patient ID: Yisel Dai is a 78 y.o. female.    HPI Treatment Adherence:   Medication compliance:  {Desc; compliance:5303::\"compliant most of the time\"}  Diet compliance:  {Desc; compliance:5303::\"compliant most of the time\"}  Weight trend: {INCREASING/DECREASING/STABLE:67037}  Current exercise: {EXERCISE TYPE:622452342}  Barriers: {Barriers to success:83793}    Hypertension:  Home blood pressure monitoring: {NO/YES:3099886879}.  She {is/is not:9024} adherent to a low sodium diet. Patient {denies/complains:31552} {Symptoms of Hypertension, Denies:55852}.  Antihypertensive medication side effects: {Hypertension med side effects:5728::\"no medication side effects noted\"}.  Use of agents associated with hypertension: {bp agents assoc with hypertension:511::\"none\"}.                                        Sodium (mEq/L)   Date Value   03/21/2024 139    BUN (mg/dL)   Date Value   03/21/2024 9    Glucose (mg/dL)   Date Value   03/21/2024 115 (H)   05/24/2018 107 (H)      Potassium (mEq/L)   Date Value   03/21/2024 3.6    Creatinine (mg/dL)   Date Value   03/21/2024 0.77         Hyperlipidemia:  No new myalgias or GI upset on {RP HYPERLIPIDEMIA MEDS:60735}.     Lab Results   Component Value Date    CHOL 173 03/21/2024    TRIG 186 (H) 03/21/2024    HDL 40 03/21/2024    LDLCALC 96 03/21/2024     Lab Results   Component Value Date    ALT 12 03/21/2024    AST 22 03/21/2024            Review of Systems    Objective:   Physical Exam    Assessment / Plan:

## 2024-03-29 NOTE — PROGRESS NOTES
Subjective:      Patient ID: Yisel Dai is a 78 y.o. female    Fall  Pertinent negatives include no fever or vomiting.   Hypertension  The current episode started more than 1 year ago. Pertinent negatives include no chest pain. Past treatments include alpha 1 blockers and beta blockers. The current treatment provides moderate improvement.   Hyperlipidemia  The current episode started more than 1 year ago. Pertinent negatives include no chest pain. Current antihyperlipidemic treatment includes ezetimibe. The current treatment provides moderate improvement of lipids.     Here in follow up for htn and lipids and blood work.  No missed doses.   Has had several minor falls over the last few months.  Losing balance at times.  Long standing urinary incontinence --not using much albuterol as using advair regular   Depression stable with cymbalta    Review of Systems   Constitutional:  Negative for chills and fever.   Respiratory:  Negative for cough.    Cardiovascular:  Negative for chest pain.   Gastrointestinal:  Negative for diarrhea and vomiting.   Musculoskeletal:  Negative for back pain.   Neurological:  Negative for weakness.     Reviewed allergy, medical, social, surgical, family and med list changes and updated   Files--reviewed blood work with pre diabetes.       Social History     Socioeconomic History    Marital status:      Spouse name: None    Number of children: None    Years of education: None    Highest education level: None   Tobacco Use    Smoking status: Never    Smokeless tobacco: Never   Vaping Use    Vaping Use: Never used   Substance and Sexual Activity    Alcohol use: Never    Drug use: No    Sexual activity: Never   Social History Narrative    ** Merged History Encounter **          Social Determinants of Health     Financial Resource Strain: Low Risk  (5/12/2023)    Overall Financial Resource Strain (CARDIA)     Difficulty of Paying Living Expenses: Not hard at all   Transportation

## 2024-05-15 ENCOUNTER — OFFICE VISIT (OUTPATIENT)
Dept: OBGYN CLINIC | Age: 79
End: 2024-05-15
Payer: MEDICARE

## 2024-05-15 VITALS
HEART RATE: 84 BPM | BODY MASS INDEX: 38.15 KG/M2 | DIASTOLIC BLOOD PRESSURE: 62 MMHG | WEIGHT: 229 LBS | SYSTOLIC BLOOD PRESSURE: 138 MMHG | HEIGHT: 65 IN

## 2024-05-15 DIAGNOSIS — N32.81 OAB (OVERACTIVE BLADDER): Primary | ICD-10-CM

## 2024-05-15 PROCEDURE — 3075F SYST BP GE 130 - 139MM HG: CPT | Performed by: OBSTETRICS & GYNECOLOGY

## 2024-05-15 PROCEDURE — G8427 DOCREV CUR MEDS BY ELIG CLIN: HCPCS | Performed by: OBSTETRICS & GYNECOLOGY

## 2024-05-15 PROCEDURE — 99203 OFFICE O/P NEW LOW 30 MIN: CPT | Performed by: OBSTETRICS & GYNECOLOGY

## 2024-05-15 PROCEDURE — G8399 PT W/DXA RESULTS DOCUMENT: HCPCS | Performed by: OBSTETRICS & GYNECOLOGY

## 2024-05-15 PROCEDURE — 3078F DIAST BP <80 MM HG: CPT | Performed by: OBSTETRICS & GYNECOLOGY

## 2024-05-15 PROCEDURE — 1036F TOBACCO NON-USER: CPT | Performed by: OBSTETRICS & GYNECOLOGY

## 2024-05-15 PROCEDURE — 1123F ACP DISCUSS/DSCN MKR DOCD: CPT | Performed by: OBSTETRICS & GYNECOLOGY

## 2024-05-15 PROCEDURE — 1090F PRES/ABSN URINE INCON ASSESS: CPT | Performed by: OBSTETRICS & GYNECOLOGY

## 2024-05-15 PROCEDURE — G8417 CALC BMI ABV UP PARAM F/U: HCPCS | Performed by: OBSTETRICS & GYNECOLOGY

## 2024-05-15 SDOH — ECONOMIC STABILITY: FOOD INSECURITY: WITHIN THE PAST 12 MONTHS, YOU WORRIED THAT YOUR FOOD WOULD RUN OUT BEFORE YOU GOT MONEY TO BUY MORE.: NEVER TRUE

## 2024-05-15 SDOH — ECONOMIC STABILITY: FOOD INSECURITY: WITHIN THE PAST 12 MONTHS, THE FOOD YOU BOUGHT JUST DIDN'T LAST AND YOU DIDN'T HAVE MONEY TO GET MORE.: NEVER TRUE

## 2024-05-15 SDOH — ECONOMIC STABILITY: INCOME INSECURITY: HOW HARD IS IT FOR YOU TO PAY FOR THE VERY BASICS LIKE FOOD, HOUSING, MEDICAL CARE, AND HEATING?: NOT HARD AT ALL

## 2024-05-15 NOTE — PROGRESS NOTES
Yisel Dai is a 79 y.o. female who presents here today for complaints of Incontinence (She states the main problem is first thing in the morning, she doesn't always make it to the bathroom when she gets up in the morning. She states this doesn't happen all of the time. Referred by Dr. Miramontes.)    Mild urgency, frequency, incontinence, nocturia 1-2 times. On and off . Incontinence in AM sometimes when first wakes up . Over past year or so .   .      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    COLONOSCOPY N/A 2/5/2024    COLONOSCOPY WITH POLYPECTOMY performed by Dean Sparrow MD at [facility]    EYE SURGERY Left     HAND SURGERY      RIGHT    HERNIA REPAIR Left 05/23/2019    [facility]    JOINT REPLACEMENT  2019    KNEE ARTHROPLASTY Bilateral     LAMINECTOMY N/A 11/7/2023    L2, L3, L4, and L5 laminectomies, medial facetectomies and bilateral foraminotomies. performed by Lane Bullard MD at [facility] OR    TONSILLECTOMY AND ADENOIDECTOMY      UMBILICAL HERNIA REPAIR N/A 05/23/2019    Primary

## 2024-06-04 ENCOUNTER — OFFICE VISIT (OUTPATIENT)
Dept: OBGYN CLINIC | Age: 79
End: 2024-06-04
Payer: MEDICARE

## 2024-06-04 VITALS
DIASTOLIC BLOOD PRESSURE: 64 MMHG | SYSTOLIC BLOOD PRESSURE: 126 MMHG | BODY MASS INDEX: 37.82 KG/M2 | HEART RATE: 76 BPM | HEIGHT: 65 IN | WEIGHT: 227 LBS

## 2024-06-04 DIAGNOSIS — N32.81 OAB (OVERACTIVE BLADDER): Primary | ICD-10-CM

## 2024-06-04 PROCEDURE — 3078F DIAST BP <80 MM HG: CPT | Performed by: OBSTETRICS & GYNECOLOGY

## 2024-06-04 PROCEDURE — G8427 DOCREV CUR MEDS BY ELIG CLIN: HCPCS | Performed by: OBSTETRICS & GYNECOLOGY

## 2024-06-04 PROCEDURE — 1036F TOBACCO NON-USER: CPT | Performed by: OBSTETRICS & GYNECOLOGY

## 2024-06-04 PROCEDURE — G8399 PT W/DXA RESULTS DOCUMENT: HCPCS | Performed by: OBSTETRICS & GYNECOLOGY

## 2024-06-04 PROCEDURE — G8417 CALC BMI ABV UP PARAM F/U: HCPCS | Performed by: OBSTETRICS & GYNECOLOGY

## 2024-06-04 PROCEDURE — 3074F SYST BP LT 130 MM HG: CPT | Performed by: OBSTETRICS & GYNECOLOGY

## 2024-06-04 PROCEDURE — 99213 OFFICE O/P EST LOW 20 MIN: CPT | Performed by: OBSTETRICS & GYNECOLOGY

## 2024-06-04 PROCEDURE — 1090F PRES/ABSN URINE INCON ASSESS: CPT | Performed by: OBSTETRICS & GYNECOLOGY

## 2024-06-04 PROCEDURE — 1123F ACP DISCUSS/DSCN MKR DOCD: CPT | Performed by: OBSTETRICS & GYNECOLOGY

## 2024-06-04 RX ORDER — MIRABEGRON 25 MG/1
25 TABLET, FILM COATED, EXTENDED RELEASE ORAL DAILY
Qty: 30 TABLET | Refills: 1 | Status: SHIPPED | OUTPATIENT
Start: 2024-06-04

## 2024-06-04 NOTE — PROGRESS NOTES
Not on file     Number of Places Lived in the Last Year: Not on file     Unstable Housing in the Last Year: No         Patient's medications,allergies, past medical, surgical, social and family histories were reviewed andupdated as appropriate.      Current Outpatient Medications:     mirabegron (MYRBETRIQ) 25 MG TB24, Take 1 tablet by mouth daily, Disp: 30 tablet, Rfl: 1    mirabegron (MYRBETRIQ) 25 MG TB24, Lot: [id number] Exp: 12/2025, Disp: 21 tablet, Rfl: 0    carvedilol (COREG) 25 MG tablet, Take 1 tablet by mouth with breakfast and with evening meal, Disp: 180 tablet, Rfl: 1    Handicap Placard MISC, by Does not apply route Good through 3/18/2029., Disp: 1 each, Rfl: 0    albuterol (PROVENTIL) (2.5 MG/3ML) 0.083% nebulizer solution, Take 3 mLs by nebulization every 6 hours as needed for Wheezing, Disp: 120 each, Rfl: 0    ezetimibe (ZETIA) 10 MG tablet, Take 1 tablet by mouth daily, Disp: 90 tablet, Rfl: 1    docusate sodium (COLACE) 100 MG capsule, Take 1 capsule by mouth 2 times daily, Disp: , Rfl:     sodium-potassium-mag sulfate (SUPREP) 17.5-3.13-1.6 GM/177ML SOLN solution, As directed, Disp: 354 mL, Rfl: 0    ADVAIR DISKUS 500-50 MCG/ACT AEPB diskus inhaler, USE 1 INHALATION ORALLY IN THE MORNING AND IN THE     EVENING, Disp: 3 each, Rfl: 0    doxazosin (CARDURA) 4 MG tablet, TAKE 1 TABLET NIGHTLY, Disp: 90 tablet, Rfl: 0    DULoxetine (CYMBALTA) 60 MG extended release capsule, Take 1 capsule by mouth daily, Disp: 90 capsule, Rfl: 1    linaCLOtide (LINZESS) 72 MCG CAPS capsule, Take 1 capsule by mouth every morning (before breakfast), Disp: 8 capsule, Rfl: 0    psyllium (METAMUCIL SMOOTH TEXTURE) 58.6 % powder, Take 2 teaspoon with 8 to 10 oz water., Disp: 425 g, Rfl: 2    Handicap Placard MISC, by Does not apply route Duration of 1 year Patient is high risk for fall, Disp: 1 each, Rfl: 0    EPINEPHrine (EPIPEN 2-DE) 0.3 MG/0.3ML SOAJ injection, Inject 0.3 mLs into the muscle once for 1 dose Use as

## 2024-06-19 ENCOUNTER — TELEPHONE (OUTPATIENT)
Dept: OBGYN CLINIC | Age: 79
End: 2024-06-19

## 2024-06-19 NOTE — TELEPHONE ENCOUNTER
Patient called and stated she had to stop the Myrbetriq due to itching all over and it was going to cost over $400 and she can't afford it. Patient would like something different sent to the pharmacy. Please let patient know what is sent in and she's aware that doctor is out of the office until Monday.

## 2024-07-11 ENCOUNTER — OFFICE VISIT (OUTPATIENT)
Dept: OBGYN CLINIC | Age: 79
End: 2024-07-11
Payer: MEDICARE

## 2024-07-11 VITALS
DIASTOLIC BLOOD PRESSURE: 74 MMHG | SYSTOLIC BLOOD PRESSURE: 126 MMHG | BODY MASS INDEX: 37.61 KG/M2 | HEART RATE: 67 BPM | WEIGHT: 226 LBS

## 2024-07-11 DIAGNOSIS — N39.41 URGE INCONTINENCE: Primary | ICD-10-CM

## 2024-07-11 PROCEDURE — 99214 OFFICE O/P EST MOD 30 MIN: CPT | Performed by: OBSTETRICS & GYNECOLOGY

## 2024-07-11 PROCEDURE — 1123F ACP DISCUSS/DSCN MKR DOCD: CPT | Performed by: OBSTETRICS & GYNECOLOGY

## 2024-07-11 PROCEDURE — 1090F PRES/ABSN URINE INCON ASSESS: CPT | Performed by: OBSTETRICS & GYNECOLOGY

## 2024-07-11 PROCEDURE — G8399 PT W/DXA RESULTS DOCUMENT: HCPCS | Performed by: OBSTETRICS & GYNECOLOGY

## 2024-07-11 PROCEDURE — G8417 CALC BMI ABV UP PARAM F/U: HCPCS | Performed by: OBSTETRICS & GYNECOLOGY

## 2024-07-11 PROCEDURE — 1036F TOBACCO NON-USER: CPT | Performed by: OBSTETRICS & GYNECOLOGY

## 2024-07-11 PROCEDURE — 3078F DIAST BP <80 MM HG: CPT | Performed by: OBSTETRICS & GYNECOLOGY

## 2024-07-11 PROCEDURE — G8427 DOCREV CUR MEDS BY ELIG CLIN: HCPCS | Performed by: OBSTETRICS & GYNECOLOGY

## 2024-07-11 PROCEDURE — 3074F SYST BP LT 130 MM HG: CPT | Performed by: OBSTETRICS & GYNECOLOGY

## 2024-07-11 PROCEDURE — 0509F URINE INCON PLAN DOCD: CPT | Performed by: OBSTETRICS & GYNECOLOGY

## 2024-07-11 NOTE — PROGRESS NOTES
Patient is 80 yo and is not eligible for anticholinergics due to side effects especially in the elderly. Patient with history of chronic constipation , cardiac disease.   Patient mentions today that she \" might have to use a cork \" , trying to express the severity of her symptoms.     Discussed InterStim versus Botox, patient opted for InterStim.  Will be scheduled for percutaneous nerve evaluation for possible stage I and stage II InterStim.    Sacral neuromodulation and PNE counseling    What I s It?  A Percutaneous Nerve Evaluation (PNE) is a test done in the office to determine if InterStim™ therapy  will improve your urinary symptoms. InterStim™ therapy is an FDA-approved treatment for urinary  urgency, frequency, urge incontinence, and retention. It is a small device that is implanted under the  skin of the upper buttocks. It works by gently stimulating the sacral nerves to help the bladder function  more normally.  Percutaneous Nerve Evaluation:  For the PNE, a temporary wire will be placed along side the third sacral nerve in your lower back. This  nerve controls bladder function. The wire is the size of a thick hair and will carry gentle electrical  pulses to the nerve. The wire will be inserted by myself Dr Mondragon using a local anesthetic. I  will know that the lead is in the correct position when you feel a tapping, pulsing, vibrating or tingling  sensation in the vaginal or rectal area. Once the wire is in the correct position, it will exit the skin and  be connected to a portable stimulator box that can be clipped to the waistband of your clothing. You  will be able to turn the stimulation up, down, on, and off with the portable stimulator.  For the next week, you will monitor changes in your symptoms. You will then follow-up in the office  to discuss changes in your urinary symptoms and to have the temporary wire removed. If your  symptoms have improved by at least 50 percent, you will be given the

## 2024-07-15 RX ORDER — DULOXETIN HYDROCHLORIDE 60 MG/1
60 CAPSULE, DELAYED RELEASE ORAL DAILY
Qty: 90 CAPSULE | Refills: 0 | Status: SHIPPED | OUTPATIENT
Start: 2024-07-15

## 2024-07-15 NOTE — TELEPHONE ENCOUNTER
requesting medication refill.     Rx requested:  Requested Prescriptions     Pending Prescriptions Disp Refills    DULoxetine (CYMBALTA) 60 MG extended release capsule 90 capsule 1     Sig: Take 1 capsule by mouth daily       Last Office Visit:   3/29/2024    Last Tox screen:      Last Medication contract:      Last refilled on :9/28/23      Next Visit Date:  Future Appointments   Date Time Provider Department Center   9/3/2024 11:30 AM Yadiel Miramontes MD [facility]

## 2024-07-29 DIAGNOSIS — J20.9 ACUTE BRONCHITIS, UNSPECIFIED ORGANISM: ICD-10-CM

## 2024-07-29 RX ORDER — EZETIMIBE 10 MG/1
10 TABLET ORAL DAILY
Qty: 90 TABLET | Refills: 1 | OUTPATIENT
Start: 2024-07-29

## 2024-07-29 RX ORDER — EZETIMIBE 10 MG/1
10 TABLET ORAL DAILY
Qty: 90 TABLET | Refills: 1 | Status: SHIPPED | OUTPATIENT
Start: 2024-07-29

## 2024-07-29 NOTE — TELEPHONE ENCOUNTER
Future Appointments    Encounter Information   Provider Department Appt Notes   8/14/2024 Ramona Mondragon MD [facility] Obstetrics and Gynecology PNE Trial   8/20/2024 Ramona Mondragon MD [facility] Obstetrics and Gynecology lead removal   9/3/2024 Yadiel Miramontes MD [facility] Primary [facility] Specialty Care follow up     Past Visits    Date Provider Specialty Visit Type Primary Dx   07/11/2024 Ramona Mondragon MD Obstetrics and Gynecology Office Visit Urge incontinence   06/04/2024 Ramona Mondragon MD Obstetrics and Gynecology Office Visit OAB (overactive bladder)   05/15/2024 Ramona Mondragon MD Obstetrics and Gynecology Office Visit OAB (overactive bladder)   03/29/2024 Yadiel Miramontes MD Family Medicine Office Visit Essential hypertension

## 2024-07-29 NOTE — TELEPHONE ENCOUNTER
patient requesting medication refill. Please approve or deny this request.    Rx requested:  Requested Prescriptions     Pending Prescriptions Disp Refills    ezetimibe (ZETIA) 10 MG tablet 90 tablet 1     Sig: Take 1 tablet by mouth daily         Last Office Visit:   3/29/2024      Next Visit Date:  Future Appointments   Date Time Provider Department Center   8/14/2024  9:30 AM [facility] [place]   8/20/2024 10:45 AM Ramona Mondragon MD [facility] [place]   9/3/2024 11:30 AM [facility]

## 2024-08-12 ENCOUNTER — TELEPHONE (OUTPATIENT)
Age: 79
End: 2024-08-12

## 2024-08-12 NOTE — TELEPHONE ENCOUNTER
PATIENT CALLED STATING SHE IS SUPPOSE TO HAVE A BLADDER STIMULATOR IMPLANTED ON WEDNESDAY. PATIENT WANTS TO KNOW IF THIS WILL AFFECT THE LUMBAR SURGERY SHE HAD DONE ON 11/07/23(L2, L3, L4, and L5 laminectomies, medial facetectomies and   bilateral foraminotomies ). PATIENT ALSO WANTS TO KNOW IF THIS WILL AFFECT ANY FUTURE POSSIBLE SURGERIES. SHE STATED SHE WAS TOLD SHE WOULDN'T BE ABLE TO GET AN MRI DONE ONCE THIS BLADDER STIMULATOR IS IN PLACE.

## 2024-08-14 ENCOUNTER — PROCEDURE VISIT (OUTPATIENT)
Dept: OBGYN CLINIC | Age: 79
End: 2024-08-14

## 2024-08-14 DIAGNOSIS — N39.41 URGE INCONTINENCE: Primary | ICD-10-CM

## 2024-08-14 NOTE — PROGRESS NOTES
Office procedure note :     Preop DX: refractory urge incontinence     Postop diagnosis : same     Anesthesia : 1% lidocaine with epinephrine     EBL : none     Surgeon : Ramona Mondragon MD     Percutaneous nerve stimulation procedure     After procedure explained, verbal consent obtained, patient was prepped and draped for procedure. Patient laying flat on stomach. A measuring tape used and 11 cm from coccyx was measured and marked.   2 points were then marked in each side from the midline, 2 cm above and lateral to the 11 cm midline point marked earlier. 5 cc of local anaesthetic was then injected at these points and along a line at a 60 degrees angle directed towards patients legs , along the imaginary line of insertion to the sacral bone.   The spinal needle provided with the interstim kit was then used and introduced at the right marked point at a 60 degree angle and advanced slowly till it hit resistence from sacral bone. More local anaethetic was injected at that point. The needle was withdrawn a little calibrated and reinserted 2 to 3 times until a spongy sensation was felt as the needle was introduced, with no bony resistence, at that point the needle was assumed to have entered the S3 sacral foramen. The interstim electrode was then hooked to needle and the patient did report fluttering sensation in rectal or vaginal areas or both, confirming probable proper placement of needle. Same steps were carried out on the left side. The wires provided were then introduced through the hollow of the spiral needles introduced earlier. At that point procedure was complete. Patient tolerated procedure.    Ramona Mondragon M.D., FAMPAROOIgnacioG

## 2024-08-20 ENCOUNTER — PROCEDURE VISIT (OUTPATIENT)
Dept: OBGYN CLINIC | Age: 79
End: 2024-08-20
Payer: MEDICARE

## 2024-08-20 ENCOUNTER — PREP FOR PROCEDURE (OUTPATIENT)
Dept: OBGYN CLINIC | Age: 79
End: 2024-08-20

## 2024-08-20 VITALS
WEIGHT: 225 LBS | HEIGHT: 65 IN | BODY MASS INDEX: 37.49 KG/M2 | SYSTOLIC BLOOD PRESSURE: 138 MMHG | HEART RATE: 72 BPM | DIASTOLIC BLOOD PRESSURE: 84 MMHG

## 2024-08-20 DIAGNOSIS — N39.41 URGE INCONTINENCE: Primary | ICD-10-CM

## 2024-08-20 DIAGNOSIS — N39.41 URGE INCONTINENCE: ICD-10-CM

## 2024-08-20 PROCEDURE — 1090F PRES/ABSN URINE INCON ASSESS: CPT | Performed by: OBSTETRICS & GYNECOLOGY

## 2024-08-20 PROCEDURE — 3079F DIAST BP 80-89 MM HG: CPT | Performed by: OBSTETRICS & GYNECOLOGY

## 2024-08-20 PROCEDURE — 0509F URINE INCON PLAN DOCD: CPT | Performed by: OBSTETRICS & GYNECOLOGY

## 2024-08-20 PROCEDURE — 1123F ACP DISCUSS/DSCN MKR DOCD: CPT | Performed by: OBSTETRICS & GYNECOLOGY

## 2024-08-20 PROCEDURE — G8427 DOCREV CUR MEDS BY ELIG CLIN: HCPCS | Performed by: OBSTETRICS & GYNECOLOGY

## 2024-08-20 PROCEDURE — 3075F SYST BP GE 130 - 139MM HG: CPT | Performed by: OBSTETRICS & GYNECOLOGY

## 2024-08-20 PROCEDURE — 99214 OFFICE O/P EST MOD 30 MIN: CPT | Performed by: OBSTETRICS & GYNECOLOGY

## 2024-08-20 PROCEDURE — G8399 PT W/DXA RESULTS DOCUMENT: HCPCS | Performed by: OBSTETRICS & GYNECOLOGY

## 2024-08-20 PROCEDURE — 1036F TOBACCO NON-USER: CPT | Performed by: OBSTETRICS & GYNECOLOGY

## 2024-08-20 PROCEDURE — G8417 CALC BMI ABV UP PARAM F/U: HCPCS | Performed by: OBSTETRICS & GYNECOLOGY

## 2024-08-20 NOTE — PROGRESS NOTES
Yisel Dai is a 79 y.o. female who presents here today for complaints of Incontinence (She states the main problem is first thing in the morning, she doesn't always make it to the bathroom when she gets up in the morning. She states this doesn't happen all of the time. Referred by Dr. Miramontes.)    Mild urgency, frequency, incontinence, nocturia 1-2 times. On and off . Incontinence in AM sometimes when first wakes up . Over past year or so .   Last visit she was started on Mirabegron, with \" inconsistent control \" as she described. Comes in today complaining that the medication caused a rash , and itchiness over her stomach .   Patient stopped using the medication .   Patient is 78 yo and is not eligible for anticholinergics due to side effects especially in the elderly. Patient with history of chronic constipation , cardiac disease.   Patient mentions today that she \" might have to use a cork \" , trying to express the severity of her symptoms.     Due to the above, last visit patient underwent a percutaneous nerve evaluation for possible stage I and stage II InterStim, patient comes in today for lead removal and follow-up.  Patient does mention improvement especially in the first day more than 80% resolution of her urge symptoms.,  The patient does state that her symptoms deteriorated the following 2 days and she was not sure if the device was working, but she still reported an improvement above 60% at that point.    Vitals:  /84 (Site: Left Upper Arm, Position: Sitting, Cuff Size: Medium Adult)   Pulse 72   Ht 1.651 m (5' 5\")   Wt 102.1 kg (225 lb)   LMP  (LMP Unknown)   BMI 37.44 kg/m²   Allergies:  Floxin [ofloxacin], Praluent [alirocumab], Questran [cholestyramine], Lovaza [omega-3-acid ethyl esters], Niacin and related, Statins, Tobramycin, and Tricor [fenofibrate]  Past Medical History:   Diagnosis Date    Arthritis     Asthma     Chronic back pain Years    Depression Many years

## 2024-08-21 RX ORDER — SODIUM CHLORIDE 9 MG/ML
INJECTION, SOLUTION INTRAVENOUS PRN
Status: CANCELLED | OUTPATIENT
Start: 2024-08-21

## 2024-08-21 RX ORDER — SODIUM CHLORIDE, SODIUM LACTATE, POTASSIUM CHLORIDE, CALCIUM CHLORIDE 600; 310; 30; 20 MG/100ML; MG/100ML; MG/100ML; MG/100ML
INJECTION, SOLUTION INTRAVENOUS CONTINUOUS
Status: CANCELLED | OUTPATIENT
Start: 2024-08-21

## 2024-08-21 RX ORDER — SODIUM CHLORIDE 0.9 % (FLUSH) 0.9 %
5-40 SYRINGE (ML) INJECTION EVERY 12 HOURS SCHEDULED
Status: CANCELLED | OUTPATIENT
Start: 2024-08-21

## 2024-08-21 RX ORDER — SODIUM CHLORIDE 0.9 % (FLUSH) 0.9 %
5-40 SYRINGE (ML) INJECTION PRN
Status: CANCELLED | OUTPATIENT
Start: 2024-08-21

## 2024-08-23 ENCOUNTER — TELEPHONE (OUTPATIENT)
Dept: FAMILY MEDICINE CLINIC | Age: 79
End: 2024-08-23

## 2024-08-23 NOTE — TELEPHONE ENCOUNTER
Patient has a PAT scheduled on 9/6. She has an appt with pcp on 9/3. She wants to know what to do about blood work. She knows that part of the PAT will be labs.     Do you still want her to get labs done before her appt on 9/3?      Please advise, thank you.

## 2024-08-26 ENCOUNTER — TELEPHONE (OUTPATIENT)
Dept: OBGYN CLINIC | Age: 79
End: 2024-08-26

## 2024-08-26 DIAGNOSIS — R32 URINARY INCONTINENCE, UNSPECIFIED TYPE: ICD-10-CM

## 2024-08-26 DIAGNOSIS — R73.03 PRE-DIABETES: Primary | ICD-10-CM

## 2024-08-26 DIAGNOSIS — R73.03 PRE-DIABETES: ICD-10-CM

## 2024-08-26 LAB
BACTERIA URNS QL MICRO: NEGATIVE /HPF
BILIRUB UR QL STRIP: NEGATIVE
CLARITY UR: ABNORMAL
COLOR UR: ABNORMAL
CRYSTALS URNS MICRO: ABNORMAL /HPF
EPI CELLS #/AREA URNS AUTO: ABNORMAL /HPF (ref 0–5)
GLUCOSE FASTING: 120 MG/DL (ref 70–99)
GLUCOSE UR STRIP-MCNC: NEGATIVE MG/DL
HGB UR QL STRIP: NEGATIVE
HYALINE CASTS #/AREA URNS AUTO: ABNORMAL /HPF (ref 0–5)
HYALINE CASTS #/AREA URNS LPF: ABNORMAL /LPF (ref 0–5)
KETONES UR STRIP-MCNC: ABNORMAL MG/DL
LEUKOCYTE ESTERASE UR QL STRIP: NEGATIVE
NITRITE UR QL STRIP: NEGATIVE
PH UR STRIP: 5.5 [PH] (ref 5–9)
PROT UR STRIP-MCNC: 30 MG/DL
RBC #/AREA URNS HPF: ABNORMAL /HPF (ref 0–2)
SP GR UR STRIP: 1.03 (ref 1–1.03)
UROBILINOGEN UR STRIP-ACNC: 0.2 E.U./DL
WBC #/AREA URNS AUTO: ABNORMAL /HPF (ref 0–5)

## 2024-08-26 NOTE — TELEPHONE ENCOUNTER
Patient is scheduled for the inner stem in September. And she has some questions because now she has two large bumps where the temporary one was and thinks that's what they are from. Also wonders if the inner stem is going to mess with her back because she had back surgery in the past.

## 2024-08-27 LAB
ESTIMATED AVERAGE GLUCOSE: 117 MG/DL
HBA1C MFR BLD: 5.7 % (ref 4–6)

## 2024-08-28 LAB — BACTERIA UR CULT: NORMAL

## 2024-09-03 ENCOUNTER — OFFICE VISIT (OUTPATIENT)
Dept: FAMILY MEDICINE CLINIC | Age: 79
End: 2024-09-03
Payer: MEDICARE

## 2024-09-03 VITALS
HEIGHT: 65 IN | BODY MASS INDEX: 37.49 KG/M2 | WEIGHT: 225 LBS | HEART RATE: 68 BPM | OXYGEN SATURATION: 95 % | DIASTOLIC BLOOD PRESSURE: 82 MMHG | SYSTOLIC BLOOD PRESSURE: 120 MMHG | TEMPERATURE: 97 F

## 2024-09-03 DIAGNOSIS — I10 ESSENTIAL HYPERTENSION: Primary | ICD-10-CM

## 2024-09-03 DIAGNOSIS — M46.1 BILATERAL SACROILIITIS (HCC): ICD-10-CM

## 2024-09-03 DIAGNOSIS — R73.03 PRE-DIABETES: ICD-10-CM

## 2024-09-03 DIAGNOSIS — I48.0 PAROXYSMAL A-FIB (HCC): ICD-10-CM

## 2024-09-03 DIAGNOSIS — E78.00 PURE HYPERCHOLESTEROLEMIA: ICD-10-CM

## 2024-09-03 DIAGNOSIS — F33.2 SEVERE EPISODE OF RECURRENT MAJOR DEPRESSIVE DISORDER, WITHOUT PSYCHOTIC FEATURES (HCC): ICD-10-CM

## 2024-09-03 PROCEDURE — G8399 PT W/DXA RESULTS DOCUMENT: HCPCS | Performed by: FAMILY MEDICINE

## 2024-09-03 PROCEDURE — 3074F SYST BP LT 130 MM HG: CPT | Performed by: FAMILY MEDICINE

## 2024-09-03 PROCEDURE — 3079F DIAST BP 80-89 MM HG: CPT | Performed by: FAMILY MEDICINE

## 2024-09-03 PROCEDURE — G8427 DOCREV CUR MEDS BY ELIG CLIN: HCPCS | Performed by: FAMILY MEDICINE

## 2024-09-03 PROCEDURE — 1123F ACP DISCUSS/DSCN MKR DOCD: CPT | Performed by: FAMILY MEDICINE

## 2024-09-03 PROCEDURE — 99214 OFFICE O/P EST MOD 30 MIN: CPT | Performed by: FAMILY MEDICINE

## 2024-09-03 PROCEDURE — 1036F TOBACCO NON-USER: CPT | Performed by: FAMILY MEDICINE

## 2024-09-03 PROCEDURE — G8417 CALC BMI ABV UP PARAM F/U: HCPCS | Performed by: FAMILY MEDICINE

## 2024-09-03 PROCEDURE — 1090F PRES/ABSN URINE INCON ASSESS: CPT | Performed by: FAMILY MEDICINE

## 2024-09-03 RX ORDER — EZETIMIBE 10 MG/1
10 TABLET ORAL DAILY
Qty: 90 TABLET | Refills: 1 | Status: SHIPPED | OUTPATIENT
Start: 2024-09-03

## 2024-09-03 RX ORDER — CARVEDILOL 25 MG/1
25 TABLET ORAL 2 TIMES DAILY WITH MEALS
Qty: 180 TABLET | Refills: 1 | Status: SHIPPED | OUTPATIENT
Start: 2024-09-03

## 2024-09-03 RX ORDER — DOXAZOSIN 4 MG/1
TABLET ORAL
Qty: 90 TABLET | Refills: 1 | Status: SHIPPED | OUTPATIENT
Start: 2024-09-03

## 2024-09-03 RX ORDER — DULOXETIN HYDROCHLORIDE 60 MG/1
60 CAPSULE, DELAYED RELEASE ORAL DAILY
Qty: 90 CAPSULE | Refills: 1 | Status: SHIPPED | OUTPATIENT
Start: 2024-09-03

## 2024-09-03 ASSESSMENT — ENCOUNTER SYMPTOMS
VOMITING: 0
NAUSEA: 0

## 2024-09-03 NOTE — PROGRESS NOTES
Subjective:      Patient ID: Yisel Dai is a 79 y.o. female    Hypertension  The current episode started more than 1 year ago. Past treatments include beta blockers and alpha 1 blockers. The current treatment provides moderate improvement.   Hyperlipidemia  The current episode started more than 1 year ago. The problem is controlled. Current antihyperlipidemic treatment includes statins. The current treatment provides mild improvement of lipids.     Here in follow up for htn and lipids and pre diabetes and depression and blood work.   Weight about the same as last time.   No missed doses of medications.  Not doing much exercise.  Minor modification on diet since last time    Review of Systems   Constitutional:  Negative for unexpected weight change.   Gastrointestinal:  Negative for nausea and vomiting.   Skin:  Negative for rash.   Neurological:  Negative for weakness.     Reviewed allergy, medical, social, surgical, family and med list changes and updated   Files--reviewed blood work with pre diabetes      Social History     Socioeconomic History    Marital status:    Tobacco Use    Smoking status: Never    Smokeless tobacco: Never   Vaping Use    Vaping status: Never Used   Substance and Sexual Activity    Alcohol use: Never    Drug use: No    Sexual activity: Never   Social History Narrative    ** Merged History Encounter **          Social Determinants of Health     Financial Resource Strain: Low Risk  (5/15/2024)    Overall Financial Resource Strain (CARDIA)     Difficulty of Paying Living Expenses: Not hard at all   Food Insecurity: No Food Insecurity (5/15/2024)    Hunger Vital Sign     Worried About Running Out of Food in the Last Year: Never true     Ran Out of Food in the Last Year: Never true   Transportation Needs: Unknown (5/15/2024)    PRAPARE - Transportation     Lack of Transportation (Non-Medical): No   Physical Activity: Inactive (2/6/2024)    Exercise Vital Sign     Days of Exercise per

## 2024-09-06 ENCOUNTER — HOSPITAL ENCOUNTER (OUTPATIENT)
Dept: PREADMISSION TESTING | Age: 79
Discharge: HOME | End: 2024-09-10

## 2024-09-06 ENCOUNTER — TELEPHONE (OUTPATIENT)
Dept: FAMILY MEDICINE CLINIC | Age: 79
End: 2024-09-06

## 2024-09-06 VITALS
TEMPERATURE: 98.1 F | HEART RATE: 66 BPM | OXYGEN SATURATION: 96 % | SYSTOLIC BLOOD PRESSURE: 150 MMHG | HEIGHT: 66 IN | DIASTOLIC BLOOD PRESSURE: 77 MMHG | WEIGHT: 225.2 LBS | BODY MASS INDEX: 36.19 KG/M2 | RESPIRATION RATE: 20 BRPM

## 2024-09-06 NOTE — TELEPHONE ENCOUNTER
Called [phone], need to confirm NEW mail order pharmacy. We received a letter by fax from [facility] Mail order, patient is no longer administering mail prescriptions for patient.  Forms are scanned into Media.

## 2024-09-07 DIAGNOSIS — E78.00 PURE HYPERCHOLESTEROLEMIA: ICD-10-CM

## 2024-09-07 DIAGNOSIS — I10 ESSENTIAL HYPERTENSION: ICD-10-CM

## 2024-09-07 DIAGNOSIS — F33.2 SEVERE EPISODE OF RECURRENT MAJOR DEPRESSIVE DISORDER, WITHOUT PSYCHOTIC FEATURES (HCC): ICD-10-CM

## 2024-09-08 RX ORDER — DULOXETIN HYDROCHLORIDE 60 MG/1
60 CAPSULE, DELAYED RELEASE ORAL DAILY
Qty: 90 CAPSULE | Refills: 1 | OUTPATIENT
Start: 2024-09-08

## 2024-09-08 RX ORDER — EZETIMIBE 10 MG/1
10 TABLET ORAL DAILY
Qty: 90 TABLET | Refills: 1 | OUTPATIENT
Start: 2024-09-08

## 2024-09-08 RX ORDER — DOXAZOSIN 4 MG/1
TABLET ORAL
Qty: 90 TABLET | Refills: 1 | OUTPATIENT
Start: 2024-09-08

## 2024-09-08 RX ORDER — CARVEDILOL 25 MG/1
25 TABLET ORAL 2 TIMES DAILY WITH MEALS
Qty: 180 TABLET | Refills: 1 | OUTPATIENT
Start: 2024-09-08

## 2024-09-09 DIAGNOSIS — I10 ESSENTIAL HYPERTENSION: ICD-10-CM

## 2024-09-09 RX ORDER — CARVEDILOL 25 MG/1
25 TABLET ORAL 2 TIMES DAILY WITH MEALS
Qty: 180 TABLET | Refills: 1 | Status: SHIPPED | OUTPATIENT
Start: 2024-09-09

## 2024-09-09 RX ORDER — DOXAZOSIN 4 MG/1
TABLET ORAL
Qty: 90 TABLET | Refills: 1 | Status: SHIPPED | OUTPATIENT
Start: 2024-09-09

## 2024-09-12 ENCOUNTER — HOSPITAL ENCOUNTER (OUTPATIENT)
Age: 79
Setting detail: OUTPATIENT SURGERY
Discharge: HOME | End: 2024-09-12
Attending: OBSTETRICS & GYNECOLOGY | Admitting: OBSTETRICS & GYNECOLOGY
Payer: MEDICARE

## 2024-09-12 ENCOUNTER — APPOINTMENT (OUTPATIENT)
Dept: GENERAL RADIOLOGY | Age: 79
End: 2024-09-12
Attending: OBSTETRICS & GYNECOLOGY
Payer: MEDICARE

## 2024-09-12 ENCOUNTER — ANESTHESIA (OUTPATIENT)
Dept: OPERATING ROOM | Age: 79
End: 2024-09-12
Payer: MEDICARE

## 2024-09-12 ENCOUNTER — ANESTHESIA EVENT (OUTPATIENT)
Dept: OPERATING ROOM | Age: 79
End: 2024-09-12
Payer: MEDICARE

## 2024-09-12 VITALS
DIASTOLIC BLOOD PRESSURE: 72 MMHG | OXYGEN SATURATION: 99 % | TEMPERATURE: 97 F | SYSTOLIC BLOOD PRESSURE: 158 MMHG | HEART RATE: 71 BPM | WEIGHT: 225 LBS | RESPIRATION RATE: 18 BRPM | BODY MASS INDEX: 36.16 KG/M2 | HEIGHT: 66 IN

## 2024-09-12 DIAGNOSIS — G89.18 POSTOPERATIVE PAIN: Primary | ICD-10-CM

## 2024-09-12 DIAGNOSIS — R52 PAIN: ICD-10-CM

## 2024-09-12 LAB
EKG ATRIAL RATE: 67 BPM
EKG P AXIS: 43 DEGREES
EKG P-R INTERVAL: 152 MS
EKG Q-T INTERVAL: 430 MS
EKG QRS DURATION: 72 MS
EKG QTC CALCULATION (BAZETT): 454 MS
EKG R AXIS: 1 DEGREES
EKG T AXIS: 38 DEGREES
EKG VENTRICULAR RATE: 67 BPM

## 2024-09-12 PROCEDURE — 7100000001 HC PACU RECOVERY - ADDTL 15 MIN: Performed by: OBSTETRICS & GYNECOLOGY

## 2024-09-12 PROCEDURE — C1778 LEAD, NEUROSTIMULATOR: HCPCS | Performed by: OBSTETRICS & GYNECOLOGY

## 2024-09-12 PROCEDURE — C1787 PATIENT PROGR, NEUROSTIM: HCPCS | Performed by: OBSTETRICS & GYNECOLOGY

## 2024-09-12 PROCEDURE — 2580000003 HC RX 258: Performed by: OBSTETRICS & GYNECOLOGY

## 2024-09-12 PROCEDURE — 7100000000 HC PACU RECOVERY - FIRST 15 MIN: Performed by: OBSTETRICS & GYNECOLOGY

## 2024-09-12 PROCEDURE — 2500000003 HC RX 250 WO HCPCS: Performed by: OBSTETRICS & GYNECOLOGY

## 2024-09-12 PROCEDURE — 64590 INS/RPL PRPH SAC/GSTR NPG/R: CPT | Performed by: OBSTETRICS & GYNECOLOGY

## 2024-09-12 PROCEDURE — 93005 ELECTROCARDIOGRAM TRACING: CPT | Performed by: STUDENT IN AN ORGANIZED HEALTH CARE EDUCATION/TRAINING PROGRAM

## 2024-09-12 PROCEDURE — 6360000002 HC RX W HCPCS: Performed by: OBSTETRICS & GYNECOLOGY

## 2024-09-12 PROCEDURE — 93010 ELECTROCARDIOGRAM REPORT: CPT | Performed by: INTERNAL MEDICINE

## 2024-09-12 PROCEDURE — 2500000003 HC RX 250 WO HCPCS: Performed by: NURSE ANESTHETIST, CERTIFIED REGISTERED

## 2024-09-12 PROCEDURE — 7100000010 HC PHASE II RECOVERY - FIRST 15 MIN: Performed by: OBSTETRICS & GYNECOLOGY

## 2024-09-12 PROCEDURE — A4217 STERILE WATER/SALINE, 500 ML: HCPCS | Performed by: OBSTETRICS & GYNECOLOGY

## 2024-09-12 PROCEDURE — C1889 IMPLANT/INSERT DEVICE, NOC: HCPCS | Performed by: OBSTETRICS & GYNECOLOGY

## 2024-09-12 PROCEDURE — 6360000002 HC RX W HCPCS: Performed by: NURSE ANESTHETIST, CERTIFIED REGISTERED

## 2024-09-12 PROCEDURE — 64561 IMPLANT NEUROELECTRODES: CPT | Performed by: OBSTETRICS & GYNECOLOGY

## 2024-09-12 PROCEDURE — C1897 LEAD, NEUROSTIM TEST KIT: HCPCS | Performed by: OBSTETRICS & GYNECOLOGY

## 2024-09-12 PROCEDURE — C1883 ADAPT/EXT, PACING/NEURO LEAD: HCPCS | Performed by: OBSTETRICS & GYNECOLOGY

## 2024-09-12 PROCEDURE — 3700000001 HC ADD 15 MINUTES (ANESTHESIA): Performed by: OBSTETRICS & GYNECOLOGY

## 2024-09-12 PROCEDURE — 3600000004 HC SURGERY LEVEL 4 BASE: Performed by: OBSTETRICS & GYNECOLOGY

## 2024-09-12 PROCEDURE — 2709999900 HC NON-CHARGEABLE SUPPLY: Performed by: OBSTETRICS & GYNECOLOGY

## 2024-09-12 PROCEDURE — 3700000000 HC ANESTHESIA ATTENDED CARE: Performed by: OBSTETRICS & GYNECOLOGY

## 2024-09-12 PROCEDURE — C1767 GENERATOR, NEURO NON-RECHARG: HCPCS | Performed by: OBSTETRICS & GYNECOLOGY

## 2024-09-12 PROCEDURE — 3600000014 HC SURGERY LEVEL 4 ADDTL 15MIN: Performed by: OBSTETRICS & GYNECOLOGY

## 2024-09-12 PROCEDURE — 7100000011 HC PHASE II RECOVERY - ADDTL 15 MIN: Performed by: OBSTETRICS & GYNECOLOGY

## 2024-09-12 PROCEDURE — 95972 ALYS CPLX SP/PN NPGT W/PRGRM: CPT | Performed by: OBSTETRICS & GYNECOLOGY

## 2024-09-12 DEVICE — NEUROSTIMULATOR INTERSTIM X RECHRGE FREE TORQ WRNCH PROD: Type: IMPLANTABLE DEVICE | Site: BUTTOCKS | Status: FUNCTIONAL

## 2024-09-12 DEVICE — ENVELOPE PLSE GENRTR M W2.7XL2.5IN NEURO ANTIBACT ABSRB: Type: IMPLANTABLE DEVICE | Site: BUTTOCKS | Status: FUNCTIONAL

## 2024-09-12 DEVICE — LEAD NERVE STIM 4.32 MM INTERSTIM SURESCAN: Type: IMPLANTABLE DEVICE | Site: SACRUM | Status: FUNCTIONAL

## 2024-09-12 RX ORDER — CEPHALEXIN 500 MG/1
500 CAPSULE ORAL 4 TIMES DAILY
Qty: 28 CAPSULE | Refills: 0 | Status: SHIPPED | OUTPATIENT
Start: 2024-09-12

## 2024-09-12 RX ORDER — IBUPROFEN 600 MG/1
600 TABLET, FILM COATED ORAL EVERY 6 HOURS PRN
Qty: 60 TABLET | Refills: 1 | Status: SHIPPED | OUTPATIENT
Start: 2024-09-12

## 2024-09-12 RX ORDER — PROPOFOL 10 MG/ML
INJECTION, EMULSION INTRAVENOUS
Status: DISCONTINUED | OUTPATIENT
Start: 2024-09-12 | End: 2024-09-12 | Stop reason: SDUPTHER

## 2024-09-12 RX ORDER — SODIUM CHLORIDE 0.9 % (FLUSH) 0.9 %
5-40 SYRINGE (ML) INJECTION EVERY 12 HOURS SCHEDULED
Status: CANCELLED | OUTPATIENT
Start: 2024-09-12

## 2024-09-12 RX ORDER — SODIUM CHLORIDE 9 MG/ML
INJECTION, SOLUTION INTRAVENOUS PRN
Status: DISCONTINUED | OUTPATIENT
Start: 2024-09-12 | End: 2024-09-12 | Stop reason: HOSPADM

## 2024-09-12 RX ORDER — OXYCODONE AND ACETAMINOPHEN 5; 325 MG/1; MG/1
2 TABLET ORAL NIGHTLY PRN
Qty: 10 TABLET | Refills: 0 | Status: SHIPPED | OUTPATIENT
Start: 2024-09-12 | End: 2024-09-17

## 2024-09-12 RX ORDER — SODIUM CHLORIDE 0.9 % (FLUSH) 0.9 %
5-40 SYRINGE (ML) INJECTION EVERY 12 HOURS SCHEDULED
Status: DISCONTINUED | OUTPATIENT
Start: 2024-09-12 | End: 2024-09-12 | Stop reason: HOSPADM

## 2024-09-12 RX ORDER — NALOXONE HYDROCHLORIDE 0.4 MG/ML
INJECTION, SOLUTION INTRAMUSCULAR; INTRAVENOUS; SUBCUTANEOUS PRN
Status: DISCONTINUED | OUTPATIENT
Start: 2024-09-12 | End: 2024-09-12 | Stop reason: HOSPADM

## 2024-09-12 RX ORDER — DIPHENHYDRAMINE HYDROCHLORIDE 50 MG/ML
12.5 INJECTION INTRAMUSCULAR; INTRAVENOUS
Status: DISCONTINUED | OUTPATIENT
Start: 2024-09-12 | End: 2024-09-12 | Stop reason: HOSPADM

## 2024-09-12 RX ORDER — SODIUM CHLORIDE 0.9 % (FLUSH) 0.9 %
5-40 SYRINGE (ML) INJECTION PRN
Status: DISCONTINUED | OUTPATIENT
Start: 2024-09-12 | End: 2024-09-12 | Stop reason: HOSPADM

## 2024-09-12 RX ORDER — ONDANSETRON 2 MG/ML
INJECTION INTRAMUSCULAR; INTRAVENOUS
Status: DISCONTINUED | OUTPATIENT
Start: 2024-09-12 | End: 2024-09-12 | Stop reason: SDUPTHER

## 2024-09-12 RX ORDER — LIDOCAINE HYDROCHLORIDE 20 MG/ML
INJECTION, SOLUTION INFILTRATION; PERINEURAL
Status: DISCONTINUED | OUTPATIENT
Start: 2024-09-12 | End: 2024-09-12 | Stop reason: SDUPTHER

## 2024-09-12 RX ORDER — FAMOTIDINE 20 MG/1
20 TABLET, FILM COATED ORAL 2 TIMES DAILY
Status: CANCELLED | OUTPATIENT
Start: 2024-09-12

## 2024-09-12 RX ORDER — ACETAMINOPHEN 500 MG
1000 TABLET ORAL EVERY 8 HOURS PRN
Status: CANCELLED | OUTPATIENT
Start: 2024-09-12

## 2024-09-12 RX ORDER — KETOROLAC TROMETHAMINE 30 MG/ML
30 INJECTION, SOLUTION INTRAMUSCULAR; INTRAVENOUS EVERY 6 HOURS
Status: CANCELLED | OUTPATIENT
Start: 2024-09-12 | End: 2024-09-14

## 2024-09-12 RX ORDER — ONDANSETRON 2 MG/ML
4 INJECTION INTRAMUSCULAR; INTRAVENOUS
Status: DISCONTINUED | OUTPATIENT
Start: 2024-09-12 | End: 2024-09-12 | Stop reason: HOSPADM

## 2024-09-12 RX ORDER — FENTANYL CITRATE 0.05 MG/ML
50 INJECTION, SOLUTION INTRAMUSCULAR; INTRAVENOUS EVERY 10 MIN PRN
Status: DISCONTINUED | OUTPATIENT
Start: 2024-09-12 | End: 2024-09-12 | Stop reason: HOSPADM

## 2024-09-12 RX ORDER — FENTANYL CITRATE 50 UG/ML
INJECTION, SOLUTION INTRAMUSCULAR; INTRAVENOUS
Status: DISCONTINUED | OUTPATIENT
Start: 2024-09-12 | End: 2024-09-12 | Stop reason: SDUPTHER

## 2024-09-12 RX ORDER — OXYCODONE HYDROCHLORIDE 5 MG/1
5 TABLET ORAL EVERY 4 HOURS PRN
Status: CANCELLED | OUTPATIENT
Start: 2024-09-12

## 2024-09-12 RX ORDER — EPHEDRINE SULFATE 50 MG/ML
INJECTION, SOLUTION INTRAVENOUS
Status: DISCONTINUED | OUTPATIENT
Start: 2024-09-12 | End: 2024-09-12 | Stop reason: SDUPTHER

## 2024-09-12 RX ORDER — ONDANSETRON 2 MG/ML
4 INJECTION INTRAMUSCULAR; INTRAVENOUS EVERY 6 HOURS PRN
Status: CANCELLED | OUTPATIENT
Start: 2024-09-12

## 2024-09-12 RX ORDER — METOCLOPRAMIDE HYDROCHLORIDE 5 MG/ML
10 INJECTION INTRAMUSCULAR; INTRAVENOUS
Status: DISCONTINUED | OUTPATIENT
Start: 2024-09-12 | End: 2024-09-12 | Stop reason: HOSPADM

## 2024-09-12 RX ORDER — OXYCODONE HYDROCHLORIDE 5 MG/1
10 TABLET ORAL EVERY 4 HOURS PRN
Status: CANCELLED | OUTPATIENT
Start: 2024-09-12

## 2024-09-12 RX ORDER — SODIUM CHLORIDE, SODIUM LACTATE, POTASSIUM CHLORIDE, CALCIUM CHLORIDE 600; 310; 30; 20 MG/100ML; MG/100ML; MG/100ML; MG/100ML
INJECTION, SOLUTION INTRAVENOUS CONTINUOUS
Status: DISCONTINUED | OUTPATIENT
Start: 2024-09-12 | End: 2024-09-12 | Stop reason: HOSPADM

## 2024-09-12 RX ORDER — MAGNESIUM HYDROXIDE/ALUMINUM HYDROXICE/SIMETHICONE 120; 1200; 1200 MG/30ML; MG/30ML; MG/30ML
15 SUSPENSION ORAL EVERY 6 HOURS PRN
Status: CANCELLED | OUTPATIENT
Start: 2024-09-12

## 2024-09-12 RX ORDER — OXYCODONE HYDROCHLORIDE 5 MG/1
5 TABLET ORAL
Status: DISCONTINUED | OUTPATIENT
Start: 2024-09-12 | End: 2024-09-12 | Stop reason: HOSPADM

## 2024-09-12 RX ORDER — DEXAMETHASONE SODIUM PHOSPHATE 10 MG/ML
INJECTION INTRAMUSCULAR; INTRAVENOUS
Status: DISCONTINUED | OUTPATIENT
Start: 2024-09-12 | End: 2024-09-12 | Stop reason: SDUPTHER

## 2024-09-12 RX ORDER — MEPERIDINE HYDROCHLORIDE 25 MG/ML
12.5 INJECTION INTRAMUSCULAR; INTRAVENOUS; SUBCUTANEOUS
Status: DISCONTINUED | OUTPATIENT
Start: 2024-09-12 | End: 2024-09-12 | Stop reason: HOSPADM

## 2024-09-12 RX ORDER — SODIUM CHLORIDE 0.9 % (FLUSH) 0.9 %
5-40 SYRINGE (ML) INJECTION PRN
Status: CANCELLED | OUTPATIENT
Start: 2024-09-12

## 2024-09-12 RX ORDER — SUCCINYLCHOLINE/SOD CL,ISO/PF 100 MG/5ML
SYRINGE (ML) INTRAVENOUS
Status: DISCONTINUED | OUTPATIENT
Start: 2024-09-12 | End: 2024-09-12 | Stop reason: SDUPTHER

## 2024-09-12 RX ORDER — SODIUM CHLORIDE, SODIUM LACTATE, POTASSIUM CHLORIDE, CALCIUM CHLORIDE 600; 310; 30; 20 MG/100ML; MG/100ML; MG/100ML; MG/100ML
INJECTION, SOLUTION INTRAVENOUS CONTINUOUS
Status: CANCELLED | OUTPATIENT
Start: 2024-09-12

## 2024-09-12 RX ORDER — ONDANSETRON 4 MG/1
4 TABLET, ORALLY DISINTEGRATING ORAL EVERY 8 HOURS PRN
Status: CANCELLED | OUTPATIENT
Start: 2024-09-12

## 2024-09-12 RX ORDER — LIDOCAINE HYDROCHLORIDE AND EPINEPHRINE 10; 10 MG/ML; UG/ML
INJECTION, SOLUTION INFILTRATION; PERINEURAL PRN
Status: DISCONTINUED | OUTPATIENT
Start: 2024-09-12 | End: 2024-09-12 | Stop reason: HOSPADM

## 2024-09-12 RX ORDER — LIDOCAINE HYDROCHLORIDE 10 MG/ML
1 INJECTION, SOLUTION EPIDURAL; INFILTRATION; INTRACAUDAL; PERINEURAL
Status: DISCONTINUED | OUTPATIENT
Start: 2024-09-12 | End: 2024-09-12 | Stop reason: HOSPADM

## 2024-09-12 RX ORDER — SODIUM CHLORIDE 9 MG/ML
INJECTION, SOLUTION INTRAVENOUS PRN
Status: CANCELLED | OUTPATIENT
Start: 2024-09-12

## 2024-09-12 RX ORDER — ACETAMINOPHEN 500 MG
1000 TABLET ORAL EVERY 6 HOURS PRN
Qty: 60 TABLET | Refills: 0 | Status: SHIPPED | OUTPATIENT
Start: 2024-09-12

## 2024-09-12 RX ORDER — GLYCOPYRROLATE 1 MG/5 ML
SYRINGE (ML) INTRAVENOUS
Status: DISCONTINUED | OUTPATIENT
Start: 2024-09-12 | End: 2024-09-12 | Stop reason: SDUPTHER

## 2024-09-12 RX ADMIN — Medication 0.1 MG: at 11:06

## 2024-09-12 RX ADMIN — Medication 0.3 MG: at 11:56

## 2024-09-12 RX ADMIN — EPHEDRINE SULFATE 5 MG: 50 INJECTION, SOLUTION INTRAVENOUS at 11:44

## 2024-09-12 RX ADMIN — Medication 0.2 MG: at 11:48

## 2024-09-12 RX ADMIN — Medication 0.2 MG: at 11:05

## 2024-09-12 RX ADMIN — Medication 0.2 MG: at 11:42

## 2024-09-12 RX ADMIN — Medication 0.2 MG: at 11:51

## 2024-09-12 RX ADMIN — ONDANSETRON 4 MG: 2 INJECTION INTRAMUSCULAR; INTRAVENOUS at 12:00

## 2024-09-12 RX ADMIN — EPHEDRINE SULFATE 10 MG: 50 INJECTION, SOLUTION INTRAVENOUS at 11:32

## 2024-09-12 RX ADMIN — PROPOFOL 40 MG: 10 INJECTION, EMULSION INTRAVENOUS at 10:45

## 2024-09-12 RX ADMIN — Medication 0.1 MG: at 11:33

## 2024-09-12 RX ADMIN — LIDOCAINE HYDROCHLORIDE 80 MG: 20 INJECTION, SOLUTION INFILTRATION; PERINEURAL at 10:35

## 2024-09-12 RX ADMIN — SODIUM CHLORIDE, POTASSIUM CHLORIDE, SODIUM LACTATE AND CALCIUM CHLORIDE: 600; 310; 30; 20 INJECTION, SOLUTION INTRAVENOUS at 10:28

## 2024-09-12 RX ADMIN — Medication 0.2 MG: at 11:29

## 2024-09-12 RX ADMIN — Medication 100 MG: at 10:37

## 2024-09-12 RX ADMIN — Medication 0.2 MG: at 11:11

## 2024-09-12 RX ADMIN — Medication 0.2 MG: at 11:06

## 2024-09-12 RX ADMIN — CEFOXITIN SODIUM 2000 MG: 2 POWDER, FOR SOLUTION INTRAVENOUS at 10:43

## 2024-09-12 RX ADMIN — PROPOFOL 120 MG: 10 INJECTION, EMULSION INTRAVENOUS at 10:36

## 2024-09-12 RX ADMIN — DEXAMETHASONE SODIUM PHOSPHATE 10 MG: 10 INJECTION INTRAMUSCULAR; INTRAVENOUS at 12:00

## 2024-09-12 RX ADMIN — FENTANYL CITRATE 50 MCG: 50 INJECTION INTRAMUSCULAR; INTRAVENOUS at 10:45

## 2024-09-12 RX ADMIN — Medication 0.2 MG: at 11:45

## 2024-09-12 RX ADMIN — Medication 0.2 MG: at 11:07

## 2024-09-12 RX ADMIN — FENTANYL CITRATE 50 MCG: 50 INJECTION INTRAMUSCULAR; INTRAVENOUS at 10:35

## 2024-09-12 RX ADMIN — Medication 0.2 MG: at 11:39

## 2024-09-12 RX ADMIN — PROPOFOL 40 MG: 10 INJECTION, EMULSION INTRAVENOUS at 10:43

## 2024-09-12 RX ADMIN — EPHEDRINE SULFATE 10 MG: 50 INJECTION, SOLUTION INTRAVENOUS at 11:40

## 2024-09-12 ASSESSMENT — PAIN SCALES - GENERAL
PAINLEVEL_OUTOF10: 0

## 2024-09-12 ASSESSMENT — PAIN - FUNCTIONAL ASSESSMENT: PAIN_FUNCTIONAL_ASSESSMENT: 0-10

## 2024-09-25 ENCOUNTER — OFFICE VISIT (OUTPATIENT)
Dept: OBGYN CLINIC | Age: 79
End: 2024-09-25

## 2024-09-25 VITALS
BODY MASS INDEX: 37.32 KG/M2 | WEIGHT: 224 LBS | HEART RATE: 72 BPM | SYSTOLIC BLOOD PRESSURE: 132 MMHG | HEIGHT: 65 IN | DIASTOLIC BLOOD PRESSURE: 82 MMHG

## 2024-09-25 DIAGNOSIS — N39.41 URGE INCONTINENCE: ICD-10-CM

## 2024-09-25 DIAGNOSIS — N39.41 URGE INCONTINENCE: Primary | ICD-10-CM

## 2024-09-25 LAB
BACTERIA URNS QL MICRO: NEGATIVE /HPF
BILIRUB UR QL STRIP: NEGATIVE
CLARITY UR: ABNORMAL
COLOR UR: ABNORMAL
CRYSTALS URNS MICRO: ABNORMAL /HPF
EPI CELLS #/AREA URNS HPF: ABNORMAL /HPF
GLUCOSE UR STRIP-MCNC: NEGATIVE MG/DL
HGB UR QL STRIP: NEGATIVE
KETONES UR STRIP-MCNC: NEGATIVE MG/DL
LEUKOCYTE ESTERASE UR QL STRIP: NEGATIVE
NITRITE UR QL STRIP: NEGATIVE
PH UR STRIP: 5.5 [PH] (ref 5–9)
PROT UR STRIP-MCNC: 30 MG/DL
RBC #/AREA URNS HPF: ABNORMAL /HPF (ref 0–2)
SP GR UR STRIP: 1.02 (ref 1–1.03)
UROBILINOGEN UR STRIP-ACNC: 1 E.U./DL
WBC #/AREA URNS HPF: ABNORMAL /HPF (ref 0–5)

## 2024-09-25 PROCEDURE — 99024 POSTOP FOLLOW-UP VISIT: CPT | Performed by: OBSTETRICS & GYNECOLOGY

## 2024-09-26 LAB — BACTERIA UR CULT: NORMAL

## 2024-10-07 ENCOUNTER — OFFICE VISIT (OUTPATIENT)
Dept: OBGYN CLINIC | Age: 79
End: 2024-10-07
Payer: MEDICARE

## 2024-10-07 VITALS
BODY MASS INDEX: 37.49 KG/M2 | WEIGHT: 225 LBS | SYSTOLIC BLOOD PRESSURE: 134 MMHG | HEART RATE: 72 BPM | HEIGHT: 65 IN | DIASTOLIC BLOOD PRESSURE: 86 MMHG

## 2024-10-07 DIAGNOSIS — R30.0 DYSURIA: ICD-10-CM

## 2024-10-07 DIAGNOSIS — R30.0 DYSURIA: Primary | ICD-10-CM

## 2024-10-07 DIAGNOSIS — N89.8 VAGINAL IRRITATION: ICD-10-CM

## 2024-10-07 DIAGNOSIS — N39.41 URGE INCONTINENCE: ICD-10-CM

## 2024-10-07 DIAGNOSIS — N32.81 OAB (OVERACTIVE BLADDER): ICD-10-CM

## 2024-10-07 LAB
BILIRUB UR QL STRIP: NEGATIVE
CLARITY UR: CLEAR
COLOR UR: YELLOW
GLUCOSE UR STRIP-MCNC: NEGATIVE MG/DL
HGB UR QL STRIP: NEGATIVE
KETONES UR STRIP-MCNC: NEGATIVE MG/DL
LEUKOCYTE ESTERASE UR QL STRIP: NEGATIVE
NITRITE UR QL STRIP: NEGATIVE
PH UR STRIP: 7 [PH] (ref 5–9)
PROT UR STRIP-MCNC: NEGATIVE MG/DL
SP GR UR STRIP: 1.01 (ref 1–1.03)
UROBILINOGEN UR STRIP-ACNC: 0.2 E.U./DL

## 2024-10-07 PROCEDURE — 1036F TOBACCO NON-USER: CPT | Performed by: OBSTETRICS & GYNECOLOGY

## 2024-10-07 PROCEDURE — 99213 OFFICE O/P EST LOW 20 MIN: CPT | Performed by: OBSTETRICS & GYNECOLOGY

## 2024-10-07 PROCEDURE — G8427 DOCREV CUR MEDS BY ELIG CLIN: HCPCS | Performed by: OBSTETRICS & GYNECOLOGY

## 2024-10-07 PROCEDURE — 3079F DIAST BP 80-89 MM HG: CPT | Performed by: OBSTETRICS & GYNECOLOGY

## 2024-10-07 PROCEDURE — G8417 CALC BMI ABV UP PARAM F/U: HCPCS | Performed by: OBSTETRICS & GYNECOLOGY

## 2024-10-07 PROCEDURE — 3075F SYST BP GE 130 - 139MM HG: CPT | Performed by: OBSTETRICS & GYNECOLOGY

## 2024-10-07 PROCEDURE — G8484 FLU IMMUNIZE NO ADMIN: HCPCS | Performed by: OBSTETRICS & GYNECOLOGY

## 2024-10-07 PROCEDURE — 1123F ACP DISCUSS/DSCN MKR DOCD: CPT | Performed by: OBSTETRICS & GYNECOLOGY

## 2024-10-07 PROCEDURE — G8399 PT W/DXA RESULTS DOCUMENT: HCPCS | Performed by: OBSTETRICS & GYNECOLOGY

## 2024-10-07 PROCEDURE — 0509F URINE INCON PLAN DOCD: CPT | Performed by: OBSTETRICS & GYNECOLOGY

## 2024-10-07 PROCEDURE — 1090F PRES/ABSN URINE INCON ASSESS: CPT | Performed by: OBSTETRICS & GYNECOLOGY

## 2024-10-07 RX ORDER — FLUCONAZOLE 150 MG/1
TABLET ORAL
Qty: 3 TABLET | Refills: 0 | Status: SHIPPED | OUTPATIENT
Start: 2024-10-07

## 2024-10-07 RX ORDER — NITROFURANTOIN 25; 75 MG/1; MG/1
100 CAPSULE ORAL 2 TIMES DAILY
Qty: 14 CAPSULE | Refills: 0 | Status: SHIPPED | OUTPATIENT
Start: 2024-10-07 | End: 2024-10-14

## 2024-10-08 NOTE — PROGRESS NOTES
Yisel Dai is a 79 y.o. female who presents here today for complaints of Vaginal Itching (Also c/o burning and pain. )    The patient, Yisel Dai, reports that she started experiencing pain and itching last week. She describes the sensation as itching and burning, extending from below her breast to her crotch. She is currently taking pain medication but has not been consistent with it.    Yisel complains of vaginal itching, which she describes as affecting her whole crotch area. She has not experienced any changes in her urinary situation since having a procedure done. Her main issue is dribbling urine while walking from the bedroom to the bathroom first thing in the morning. This issue persists for the first couple of times she gets up, but she reports no problems during the day or night.    The patient states that she used to have urgency, frequency, and incontinence during the day and would wake up 1-2 times at night. However, she now does not wake up at night and does not experience urgency during the day. 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  .      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

## 2024-10-09 LAB
BACTERIA UR CULT: ABNORMAL
BACTERIA UR CULT: ABNORMAL
ORGANISM: ABNORMAL

## 2024-10-11 ENCOUNTER — OFFICE VISIT (OUTPATIENT)
Dept: OBGYN CLINIC | Age: 79
End: 2024-10-11
Payer: MEDICARE

## 2024-10-11 VITALS
HEIGHT: 65 IN | DIASTOLIC BLOOD PRESSURE: 86 MMHG | HEART RATE: 84 BPM | WEIGHT: 225 LBS | SYSTOLIC BLOOD PRESSURE: 136 MMHG | BODY MASS INDEX: 37.49 KG/M2

## 2024-10-11 DIAGNOSIS — Z09 POSTOP CHECK: ICD-10-CM

## 2024-10-11 DIAGNOSIS — N39.41 URGE INCONTINENCE: Primary | ICD-10-CM

## 2024-10-11 PROCEDURE — G8417 CALC BMI ABV UP PARAM F/U: HCPCS | Performed by: OBSTETRICS & GYNECOLOGY

## 2024-10-11 PROCEDURE — 1036F TOBACCO NON-USER: CPT | Performed by: OBSTETRICS & GYNECOLOGY

## 2024-10-11 PROCEDURE — 1090F PRES/ABSN URINE INCON ASSESS: CPT | Performed by: OBSTETRICS & GYNECOLOGY

## 2024-10-11 PROCEDURE — 99213 OFFICE O/P EST LOW 20 MIN: CPT | Performed by: OBSTETRICS & GYNECOLOGY

## 2024-10-11 PROCEDURE — 3075F SYST BP GE 130 - 139MM HG: CPT | Performed by: OBSTETRICS & GYNECOLOGY

## 2024-10-11 PROCEDURE — G8399 PT W/DXA RESULTS DOCUMENT: HCPCS | Performed by: OBSTETRICS & GYNECOLOGY

## 2024-10-11 PROCEDURE — 0509F URINE INCON PLAN DOCD: CPT | Performed by: OBSTETRICS & GYNECOLOGY

## 2024-10-11 PROCEDURE — 1123F ACP DISCUSS/DSCN MKR DOCD: CPT | Performed by: OBSTETRICS & GYNECOLOGY

## 2024-10-11 PROCEDURE — G8484 FLU IMMUNIZE NO ADMIN: HCPCS | Performed by: OBSTETRICS & GYNECOLOGY

## 2024-10-11 PROCEDURE — G8428 CUR MEDS NOT DOCUMENT: HCPCS | Performed by: OBSTETRICS & GYNECOLOGY

## 2024-10-11 PROCEDURE — 3079F DIAST BP 80-89 MM HG: CPT | Performed by: OBSTETRICS & GYNECOLOGY

## 2024-10-11 NOTE — PROGRESS NOTES
on file   Intimate Partner Violence: Not on file   Housing Stability: Unknown (5/15/2024)    Housing Stability Vital Sign     Unable to Pay for Housing in the Last Year: Not on file     Number of Places Lived in the Last Year: Not on file     Unstable Housing in the Last Year: No       Contraceptive method:  none    Patient's medications, allergies, past medical, surgical, social and family histories were reviewed and updated as appropriate.    Review of Systems  As per chief complaint   All other systems reviewed and are negative.    Physical Exam:  Vitals:  /86 (Site: Left Upper Arm, Position: Sitting, Cuff Size: Large Adult)   Pulse 84   Ht 1.651 m (5' 5\")   Wt 102.1 kg (225 lb)   LMP  (LMP Unknown)   BMI 37.44 kg/m²   Lungs: CTAB   Heart : Regular S1/S2, no M/R/G  Abdomen: Soft , NT, ND , + BS   Pelvic exam : Deferred    Assessment:      Diagnosis Orders   1. Urge incontinence        2. Postop check            Plan:   Urge incontinence resolved at this time after device changes implemented and recent UTI treated  InterStim intensity increased from 2.9-3.5  Patient to follow-up as needed  Urine sent for analysis and culture  UTI infection signs and symptoms discussed with patient      No orders of the defined types were placed in this encounter.    No orders of the defined types were placed in this encounter.      Follow Up:  Return if symptoms worsen or fail to improve, for next annual exam visit.        Ramona Mondragon MD

## 2024-10-22 DIAGNOSIS — I10 ESSENTIAL HYPERTENSION: ICD-10-CM

## 2024-10-22 RX ORDER — FLUTICASONE PROPIONATE AND SALMETEROL 500; 50 UG/1; UG/1
1 POWDER RESPIRATORY (INHALATION) EVERY 12 HOURS
Qty: 60 EACH | Refills: 1 | Status: SHIPPED | OUTPATIENT
Start: 2024-10-22

## 2024-10-22 RX ORDER — DOXAZOSIN 4 MG/1
TABLET ORAL
Qty: 90 TABLET | Refills: 1 | Status: SHIPPED | OUTPATIENT
Start: 2024-10-22

## 2024-10-22 RX ORDER — CARVEDILOL 25 MG/1
25 TABLET ORAL 2 TIMES DAILY WITH MEALS
Qty: 180 TABLET | Refills: 1 | Status: SHIPPED | OUTPATIENT
Start: 2024-10-22

## 2024-10-23 DIAGNOSIS — F33.2 SEVERE EPISODE OF RECURRENT MAJOR DEPRESSIVE DISORDER, WITHOUT PSYCHOTIC FEATURES (HCC): ICD-10-CM

## 2024-10-23 RX ORDER — DULOXETIN HYDROCHLORIDE 60 MG/1
60 CAPSULE, DELAYED RELEASE ORAL DAILY
Qty: 90 CAPSULE | Refills: 0 | Status: SHIPPED | OUTPATIENT
Start: 2024-10-23

## 2024-10-23 NOTE — TELEPHONE ENCOUNTER
Patient requesting medication refill. Please approve or deny this request.    Rx requested:  Requested Prescriptions     Pending Prescriptions Disp Refills    DULoxetine (CYMBALTA) 60 MG extended release capsule 90 capsule 1     Sig: Take 1 capsule by mouth daily         Last Office Visit:   9/3/2024      Next Visit Date:  Future Appointments   Date Time Provider Department Center   12/11/2024  9:45 AM Yadiel Miramontes MD [facility] DEP

## 2024-10-28 DIAGNOSIS — E78.00 PURE HYPERCHOLESTEROLEMIA: ICD-10-CM

## 2024-10-28 RX ORDER — EZETIMIBE 10 MG/1
10 TABLET ORAL DAILY
Qty: 90 TABLET | Refills: 1 | Status: SHIPPED | OUTPATIENT
Start: 2024-10-28

## 2024-10-28 NOTE — TELEPHONE ENCOUNTER
Future Appointments    Encounter Information   Provider Department Appt Notes   12/11/2024 Yadiel Miramontes MD [facility] 3 Month Follow Up     Past Visits    Date Provider Specialty Visit Type Primary Dx   10/11/2024 Ramona Mondragon MD Obstetrics and Gynecology Office Visit Urge incontinence   10/07/2024 Ramona Mondragon MD Obstetrics and Gynecology Office Visit Dysuria   09/25/2024 Ramona Mondragon MD Obstetrics and Gynecology Office Visit Urge incontinence   09/12/2024 Ramona Mondragon MD IP Unit Surgery    09/03/2024 Yadiel Miramontes MD Family Medicine Office Visit Essential hypertension

## 2024-11-05 ENCOUNTER — TELEPHONE (OUTPATIENT)
Dept: FAMILY MEDICINE CLINIC | Age: 79
End: 2024-11-05

## 2024-11-05 DIAGNOSIS — J45.40 MODERATE PERSISTENT ASTHMA WITHOUT COMPLICATION: Primary | ICD-10-CM

## 2024-11-05 RX ORDER — FLUTICASONE PROPIONATE AND SALMETEROL 500; 50 UG/1; UG/1
1 POWDER RESPIRATORY (INHALATION) EVERY 12 HOURS
Qty: 60 EACH | Refills: 1 | Status: CANCELLED | OUTPATIENT
Start: 2024-11-05

## 2024-11-05 NOTE — TELEPHONE ENCOUNTER
[facility] called in stating that the medication change request for the patient that was faxed on 10/25/2024 for the Advair Diskus needs to be re-sent.    The Rx is missing Dr. Miramontes's NPI and his signature. Rx cannot be filled and sent until the pharmacy receives this update.

## 2024-11-06 DIAGNOSIS — J45.40 MODERATE PERSISTENT ASTHMA WITHOUT COMPLICATION: Primary | ICD-10-CM

## 2024-11-06 RX ORDER — FLUTICASONE PROPIONATE AND SALMETEROL 500; 50 UG/1; UG/1
1 POWDER RESPIRATORY (INHALATION) EVERY 12 HOURS
Qty: 60 EACH | Refills: 3 | Status: SHIPPED | OUTPATIENT
Start: 2024-11-06

## 2024-11-17 DIAGNOSIS — F33.2 SEVERE EPISODE OF RECURRENT MAJOR DEPRESSIVE DISORDER, WITHOUT PSYCHOTIC FEATURES (HCC): ICD-10-CM

## 2024-11-18 RX ORDER — DULOXETIN HYDROCHLORIDE 60 MG/1
60 CAPSULE, DELAYED RELEASE ORAL DAILY
Qty: 90 CAPSULE | Refills: 0 | OUTPATIENT
Start: 2024-11-18

## 2024-12-03 DIAGNOSIS — R73.03 PRE-DIABETES: ICD-10-CM

## 2024-12-03 LAB — GLUCOSE FASTING: 119 MG/DL (ref 70–99)

## 2024-12-04 LAB
ESTIMATED AVERAGE GLUCOSE: 114 MG/DL
HBA1C MFR BLD: 5.6 % (ref 4–6)

## 2024-12-11 ENCOUNTER — OFFICE VISIT (OUTPATIENT)
Dept: FAMILY MEDICINE CLINIC | Age: 79
End: 2024-12-11

## 2024-12-11 VITALS
HEART RATE: 72 BPM | TEMPERATURE: 97.7 F | BODY MASS INDEX: 37.36 KG/M2 | SYSTOLIC BLOOD PRESSURE: 120 MMHG | WEIGHT: 224.2 LBS | OXYGEN SATURATION: 94 % | HEIGHT: 65 IN | DIASTOLIC BLOOD PRESSURE: 82 MMHG

## 2024-12-11 DIAGNOSIS — R73.03 PRE-DIABETES: Primary | ICD-10-CM

## 2024-12-11 DIAGNOSIS — E78.00 PURE HYPERCHOLESTEROLEMIA: ICD-10-CM

## 2024-12-11 ASSESSMENT — ENCOUNTER SYMPTOMS
DIARRHEA: 0
VOMITING: 0
COUGH: 0

## 2024-12-11 NOTE — PROGRESS NOTES
Subjective:      Patient ID: Yisel Dai is a 79 y.o. female    HPI  Here in follow up for pre diabetes and blood work.   Diet not much different than last time.  No regular exercise      Review of Systems   Constitutional:  Negative for chills and fever.   Respiratory:  Negative for cough.    Gastrointestinal:  Negative for diarrhea and vomiting.     Reviewed allergy, medical, social, surgical, family and med list changes and updated   Files--reviewed blood work with pre diabetes      Social History     Socioeconomic History    Marital status:    Tobacco Use    Smoking status: Never    Smokeless tobacco: Never   Vaping Use    Vaping status: Never Used   Substance and Sexual Activity    Alcohol use: Never    Drug use: No    Sexual activity: Never   Social History Narrative    ** Merged History Encounter **          Social Determinants of Health     Financial Resource Strain: Low Risk  (5/15/2024)    Overall Financial Resource Strain (CARDIA)     Difficulty of Paying Living Expenses: Not hard at all   Food Insecurity: No Food Insecurity (5/15/2024)    Hunger Vital Sign     Worried About Running Out of Food in the Last Year: Never true     Ran Out of Food in the Last Year: Never true   Transportation Needs: Unknown (5/15/2024)    PRAPARE - Transportation     Lack of Transportation (Non-Medical): No   Physical Activity: Inactive (2/6/2024)    Exercise Vital Sign     Days of Exercise per Week: 0 days     Minutes of Exercise per Session: 0 min   Stress: Stress Concern Present (5/20/2021)    [employer] [place] [employer] - Occupational Stress Questionnaire     Feeling of Stress : Very much   Housing Stability: Unknown (5/15/2024)    Housing Stability Vital Sign     Unstable Housing in the Last Year: No     Current Outpatient Medications   Medication Sig Dispense Refill    fluticasone-salmeterol (ADVAIR DISKUS) 500-50 MCG/ACT AEPB diskus inhaler Inhale 1 puff into the lungs in the morning and 1 puff in

## 2024-12-30 DIAGNOSIS — F33.2 SEVERE EPISODE OF RECURRENT MAJOR DEPRESSIVE DISORDER, WITHOUT PSYCHOTIC FEATURES (HCC): ICD-10-CM

## 2024-12-31 RX ORDER — DULOXETIN HYDROCHLORIDE 60 MG/1
60 CAPSULE, DELAYED RELEASE ORAL DAILY
Qty: 90 CAPSULE | Refills: 0 | Status: SHIPPED | OUTPATIENT
Start: 2024-12-31

## 2024-12-31 NOTE — TELEPHONE ENCOUNTER
Pharmacy requesting medication refill. Please approve or deny this request.    Rx requested:  Requested Prescriptions     Pending Prescriptions Disp Refills    DULoxetine (CYMBALTA) 60 MG extended release capsule [Pharmacy Med Name: Duloxetine 60mg Delayed-Release Capsule] 90 capsule 0     Sig: Take 1 capsule by mouth daily.         Last Office Visit:   12/11/2024      Next Visit Date:  Future Appointments   Date Time Provider Department Center   5/12/2025 10:45 AM Yadiel Miramontes MD [facility] DEP

## 2025-03-21 ENCOUNTER — TELEMEDICINE (OUTPATIENT)
Age: 80
End: 2025-03-21
Payer: MEDICARE

## 2025-03-21 DIAGNOSIS — Z00.00 MEDICARE ANNUAL WELLNESS VISIT, SUBSEQUENT: Primary | ICD-10-CM

## 2025-03-21 PROCEDURE — G0439 PPPS, SUBSEQ VISIT: HCPCS | Performed by: NURSE PRACTITIONER

## 2025-03-21 PROCEDURE — 1123F ACP DISCUSS/DSCN MKR DOCD: CPT | Performed by: NURSE PRACTITIONER

## 2025-03-21 PROCEDURE — 1159F MED LIST DOCD IN RCRD: CPT | Performed by: NURSE PRACTITIONER

## 2025-03-21 SDOH — ECONOMIC STABILITY: FOOD INSECURITY: WITHIN THE PAST 12 MONTHS, YOU WORRIED THAT YOUR FOOD WOULD RUN OUT BEFORE YOU GOT MONEY TO BUY MORE.: NEVER TRUE

## 2025-03-21 SDOH — ECONOMIC STABILITY: FOOD INSECURITY: WITHIN THE PAST 12 MONTHS, THE FOOD YOU BOUGHT JUST DIDN'T LAST AND YOU DIDN'T HAVE MONEY TO GET MORE.: NEVER TRUE

## 2025-03-21 ASSESSMENT — PATIENT HEALTH QUESTIONNAIRE - PHQ9
5. POOR APPETITE OR OVEREATING: NOT AT ALL
4. FEELING TIRED OR HAVING LITTLE ENERGY: NOT AT ALL
9. THOUGHTS THAT YOU WOULD BE BETTER OFF DEAD, OR OF HURTING YOURSELF: NOT AT ALL
10. IF YOU CHECKED OFF ANY PROBLEMS, HOW DIFFICULT HAVE THESE PROBLEMS MADE IT FOR YOU TO DO YOUR WORK, TAKE CARE OF THINGS AT HOME, OR GET ALONG WITH OTHER PEOPLE: NOT DIFFICULT AT ALL
8. MOVING OR SPEAKING SO SLOWLY THAT OTHER PEOPLE COULD HAVE NOTICED. OR THE OPPOSITE, BEING SO FIGETY OR RESTLESS THAT YOU HAVE BEEN MOVING AROUND A LOT MORE THAN USUAL: NOT AT ALL
SUM OF ALL RESPONSES TO PHQ QUESTIONS 1-9: 0
SUM OF ALL RESPONSES TO PHQ QUESTIONS 1-9: 0
1. LITTLE INTEREST OR PLEASURE IN DOING THINGS: NOT AT ALL
2. FEELING DOWN, DEPRESSED OR HOPELESS: NOT AT ALL
7. TROUBLE CONCENTRATING ON THINGS, SUCH AS READING THE NEWSPAPER OR WATCHING TELEVISION: NOT AT ALL
SUM OF ALL RESPONSES TO PHQ QUESTIONS 1-9: 0
SUM OF ALL RESPONSES TO PHQ QUESTIONS 1-9: 0
3. TROUBLE FALLING OR STAYING ASLEEP: NOT AT ALL
6. FEELING BAD ABOUT YOURSELF - OR THAT YOU ARE A FAILURE OR HAVE LET YOURSELF OR YOUR FAMILY DOWN: NOT AT ALL

## 2025-03-21 ASSESSMENT — LIFESTYLE VARIABLES
HOW MANY STANDARD DRINKS CONTAINING ALCOHOL DO YOU HAVE ON A TYPICAL DAY: PATIENT DOES NOT DRINK
HOW OFTEN DO YOU HAVE A DRINK CONTAINING ALCOHOL: NEVER

## 2025-03-21 NOTE — PROGRESS NOTES
(EPIPEN 2-DE) 0.3 MG/0.3ML SOAJ injection Inject 0.3 mLs into the muscle once for 1 dose Use as directed for allergic reaction. Please give generic  Justice Soto MD       CareTeam (Including outside providers/suppliers regularly involved in providing care):   Patient Care Team:  Yadiel Miramontes MD as PCP - General (Family Medicine)  Yadiel Miramontes MD as PCP - Empaneled Provider  Shashi Benítez MD as Consulting Physician (Pain Management)  Miguel García MD as Consulting Physician (Urology)     Recommendations for Preventive Services Due: see orders and patient instructions/AVS.  Recommended screening schedule for the next 5-10 years is provided to the patient in written form: see Patient Instructions/AVS.     Reviewed and updated this visit:  Allergies  Meds  Sexual Hx               Yisel Dai, was evaluated through a synchronous (real-time) audio-video encounter. The patient (or guardian if applicable) is aware that this is a billable service, which includes applicable co-pays. This Virtual Visit was conducted with patient's (and/or legal guardian's) consent. Patient identification was verified, and a caregiver was present when appropriate.   The patient was located at Home: [address] [address]  Provider was located at Facility (Appt Dept): [address] [place] [address]  Confirm you are appropriately licensed, registered, or certified to deliver care in the state where the patient is located as indicated above. If you are not or unsure, please re-schedule the visit: Yes, I confirm.

## 2025-03-21 NOTE — PATIENT INSTRUCTIONS
raising cholesterol levels.     Limit the amount of solid fat--butter, margarine, and shortening--you eat. Use olive, peanut, or canola oil when you cook. Bake, broil, and steam foods instead of frying them.     Eat a variety of fruit and vegetables every day. Dark green, deep orange, red, or yellow fruits and vegetables are especially good for you. Examples include spinach, carrots, peaches, and berries.     Foods high in fiber can reduce your cholesterol and provide important vitamins and minerals. High-fiber foods include whole-grain cereals and breads, oatmeal, beans, brown rice, citrus fruits, and apples.     Eat lean proteins. Heart-healthy proteins include seafood, lean meats and poultry, eggs, beans, peas, nuts, seeds, and soy products.     Limit drinks and foods with added sugar. These include candy, desserts, and soda pop.   Heart-healthy lifestyle    If your doctor recommends it, get more exercise. For many people, walking is a good choice. Or you may want to swim, bike, or do other activities. Bit by bit, increase the time you're active every day. Try for at least 30 minutes on most days of the week.     Try to quit or cut back on using tobacco and other nicotine products. This includes smoking and vaping. If you need help quitting, talk to your doctor about stop-smoking programs and medicines. These can increase your chances of quitting for good. Quitting is one of the most important things you can do to protect your heart. It is never too late to quit. Try to avoid secondhand smoke too.     Stay at a weight that's healthy for you. Talk to your doctor if you need help losing weight.     Try to get 7 to 9 hours of sleep each night.     Limit alcohol to 2 drinks a day for men and 1 drink a day for women. Too much alcohol can cause health problems.     Manage other health problems such as diabetes, high blood pressure, and high cholesterol. If you think you may have a problem with alcohol or drug use, talk

## 2025-04-04 DIAGNOSIS — F33.2 SEVERE EPISODE OF RECURRENT MAJOR DEPRESSIVE DISORDER, WITHOUT PSYCHOTIC FEATURES (HCC): ICD-10-CM

## 2025-04-04 RX ORDER — DULOXETIN HYDROCHLORIDE 60 MG/1
60 CAPSULE, DELAYED RELEASE ORAL DAILY
Qty: 90 CAPSULE | Refills: 0 | Status: SHIPPED | OUTPATIENT
Start: 2025-04-04

## 2025-04-21 ENCOUNTER — CLINICAL SUPPORT (OUTPATIENT)
Dept: OBGYN CLINIC | Age: 80
End: 2025-04-21

## 2025-04-21 ENCOUNTER — TELEPHONE (OUTPATIENT)
Dept: OBGYN CLINIC | Age: 80
End: 2025-04-21

## 2025-04-21 DIAGNOSIS — R32 URINARY INCONTINENCE, UNSPECIFIED TYPE: Primary | ICD-10-CM

## 2025-04-21 LAB
BILIRUB UR QL STRIP: NEGATIVE
CLARITY UR: CLEAR
COLOR UR: YELLOW
GLUCOSE UR STRIP-MCNC: NEGATIVE MG/DL
HGB UR QL STRIP: NEGATIVE
KETONES UR STRIP-MCNC: NEGATIVE MG/DL
LEUKOCYTE ESTERASE UR QL STRIP: NEGATIVE
NITRITE UR QL STRIP: NEGATIVE
PH UR STRIP: 6.5 [PH] (ref 5–9)
PROT UR STRIP-MCNC: ABNORMAL MG/DL
SP GR UR STRIP: 1.02 (ref 1–1.03)
UROBILINOGEN UR STRIP-ACNC: 0.2 E.U./DL

## 2025-04-21 NOTE — TELEPHONE ENCOUNTER
Pt called in and is having issues with her medtronic not working right, Sydnie advise me to have pt come in and leave a urine and to have Pt call the Medtronic help desk,pt was advise and made sure she had the numbers and will drop a urine off today in [place].

## 2025-04-22 LAB — BACTERIA UR CULT: NORMAL

## 2025-04-22 NOTE — TELEPHONE ENCOUNTER
Pt called and stated she saw her urine results and wondering what they mean and if she should be taking something,please advise

## 2025-04-30 DIAGNOSIS — E78.00 PURE HYPERCHOLESTEROLEMIA: ICD-10-CM

## 2025-04-30 RX ORDER — EZETIMIBE 10 MG/1
10 TABLET ORAL DAILY
Qty: 90 TABLET | Refills: 0 | Status: SHIPPED | OUTPATIENT
Start: 2025-04-30

## 2025-05-05 ENCOUNTER — ANCILLARY PROCEDURE (OUTPATIENT)
Age: 80
End: 2025-05-05
Payer: MEDICARE

## 2025-05-05 ENCOUNTER — OFFICE VISIT (OUTPATIENT)
Dept: OBGYN CLINIC | Age: 80
End: 2025-05-05
Payer: MEDICARE

## 2025-05-05 VITALS
HEIGHT: 65 IN | DIASTOLIC BLOOD PRESSURE: 80 MMHG | HEART RATE: 65 BPM | WEIGHT: 227 LBS | SYSTOLIC BLOOD PRESSURE: 150 MMHG | BODY MASS INDEX: 37.82 KG/M2

## 2025-05-05 DIAGNOSIS — N32.81 OAB (OVERACTIVE BLADDER): Primary | ICD-10-CM

## 2025-05-05 DIAGNOSIS — N32.81 OAB (OVERACTIVE BLADDER): ICD-10-CM

## 2025-05-05 DIAGNOSIS — I10 ESSENTIAL HYPERTENSION: ICD-10-CM

## 2025-05-05 PROCEDURE — 72220 X-RAY EXAM SACRUM TAILBONE: CPT | Performed by: RADIOLOGY

## 2025-05-05 PROCEDURE — 95971 ALYS SMPL SP/PN NPGT W/PRGRM: CPT | Performed by: OBSTETRICS & GYNECOLOGY

## 2025-05-05 PROCEDURE — 3077F SYST BP >= 140 MM HG: CPT | Performed by: OBSTETRICS & GYNECOLOGY

## 2025-05-05 PROCEDURE — 1036F TOBACCO NON-USER: CPT | Performed by: OBSTETRICS & GYNECOLOGY

## 2025-05-05 PROCEDURE — 1123F ACP DISCUSS/DSCN MKR DOCD: CPT | Performed by: OBSTETRICS & GYNECOLOGY

## 2025-05-05 PROCEDURE — G8417 CALC BMI ABV UP PARAM F/U: HCPCS | Performed by: OBSTETRICS & GYNECOLOGY

## 2025-05-05 PROCEDURE — 99213 OFFICE O/P EST LOW 20 MIN: CPT | Performed by: OBSTETRICS & GYNECOLOGY

## 2025-05-05 PROCEDURE — 3079F DIAST BP 80-89 MM HG: CPT | Performed by: OBSTETRICS & GYNECOLOGY

## 2025-05-05 PROCEDURE — G8427 DOCREV CUR MEDS BY ELIG CLIN: HCPCS | Performed by: OBSTETRICS & GYNECOLOGY

## 2025-05-05 PROCEDURE — 1159F MED LIST DOCD IN RCRD: CPT | Performed by: OBSTETRICS & GYNECOLOGY

## 2025-05-05 PROCEDURE — G8399 PT W/DXA RESULTS DOCUMENT: HCPCS | Performed by: OBSTETRICS & GYNECOLOGY

## 2025-05-05 PROCEDURE — 1090F PRES/ABSN URINE INCON ASSESS: CPT | Performed by: OBSTETRICS & GYNECOLOGY

## 2025-05-05 RX ORDER — CARVEDILOL 25 MG/1
25 TABLET ORAL 2 TIMES DAILY WITH MEALS
Qty: 180 TABLET | Refills: 0 | Status: SHIPPED | OUTPATIENT
Start: 2025-05-05

## 2025-05-05 NOTE — PROGRESS NOTES
Left     HAND SURGERY      RIGHT    HERNIA REPAIR Left 2019    primary repairs [facility]    JOINT REPLACEMENT  2019    KNEE ARTHROPLASTY Bilateral     LAMINECTOMY N/A 2023    L2, L3, L4, and L5 laminectomies, medial facetectomies and bilateral foraminotomies. performed by Lane Bullard MD at [facility]    STIMULATOR SURGERY N/A 2024    MEDTRONIC STAGE 1 AND STAGE 2 INTERSTIM performed by Ramona Mondragon MD at [facility]    TONSILLECTOMY AND ADENOIDECTOMY      UMBILICAL HERNIA REPAIR N/A 2019    Primary repair [facility]     OB History          4    Para   4    Term   4            AB        Living             SAB        IAB        Ectopic        Molar        Multiple        Live Births                  Family History   Problem Relation Age of Onset    Colon Cancer Mother     Cancer Mother         BREAST, COLON    Breast Cancer Mother     High Blood Pressure Father     Other Sister     Mult Sclerosis Sister     No Known Problems Son     No Known Problems Daughter      Social History     Socioeconomic History    Marital status:      Spouse name: Not on file    Number of children: Not on file    Years of education: Not on file    Highest education level: Not on file   Occupational History    Not on file   Tobacco Use    Smoking status: Never    Smokeless tobacco: Never   Vaping Use    Vaping status: Never Used   Substance and Sexual Activity    Alcohol use: Never    Drug use: No    Sexual activity: Not Currently   Other Topics Concern    Not on file   Social History Narrative    ** Merged History Encounter **          Social Drivers of Health     Financial Resource Strain: Low Risk  (5/15/2024)    Overall Financial Resource Strain (CARDIA)     Difficulty of Paying Living Expenses: Not hard at all   Food Insecurity: No Food Insecurity (3/21/2025)    Hunger Vital Sign     Worried About Running Out of Food in the Last Year: Never true     Ran Out of Food in the Last Year: Never true

## 2025-05-06 ENCOUNTER — RESULTS FOLLOW-UP (OUTPATIENT)
Dept: OBGYN CLINIC | Age: 80
End: 2025-05-06

## 2025-05-06 ENCOUNTER — PREP FOR PROCEDURE (OUTPATIENT)
Dept: OBGYN CLINIC | Age: 80
End: 2025-05-06

## 2025-05-06 DIAGNOSIS — T85.193A OTHER MECHANICAL COMPLICATION OF IMPLANTED ELECTRONIC NEUROSTIMULATOR, GENERATOR, INITIAL ENCOUNTER: ICD-10-CM

## 2025-05-06 NOTE — TELEPHONE ENCOUNTER
Per Dr. Mondragon , pt was called, discussed result and scheduling surgery for Interstim Replacement . Pt scheduled for 5-15-25. PAT 5/8/25 @ 2 pm

## 2025-05-07 RX ORDER — SODIUM CHLORIDE 0.9 % (FLUSH) 0.9 %
5-40 SYRINGE (ML) INJECTION EVERY 12 HOURS SCHEDULED
Status: CANCELLED | OUTPATIENT
Start: 2025-05-15

## 2025-05-07 RX ORDER — SODIUM CHLORIDE 0.9 % (FLUSH) 0.9 %
5-40 SYRINGE (ML) INJECTION PRN
Status: CANCELLED | OUTPATIENT
Start: 2025-05-15

## 2025-05-07 RX ORDER — SODIUM CHLORIDE, SODIUM LACTATE, POTASSIUM CHLORIDE, CALCIUM CHLORIDE 600; 310; 30; 20 MG/100ML; MG/100ML; MG/100ML; MG/100ML
INJECTION, SOLUTION INTRAVENOUS CONTINUOUS
Status: CANCELLED | OUTPATIENT
Start: 2025-05-15

## 2025-05-07 RX ORDER — SODIUM CHLORIDE 9 MG/ML
INJECTION, SOLUTION INTRAVENOUS PRN
Status: CANCELLED | OUTPATIENT
Start: 2025-05-15

## 2025-05-08 ENCOUNTER — HOSPITAL ENCOUNTER (OUTPATIENT)
Dept: PREADMISSION TESTING | Age: 80
Discharge: HOME | End: 2025-05-12

## 2025-05-08 VITALS
HEIGHT: 66 IN | BODY MASS INDEX: 36 KG/M2 | OXYGEN SATURATION: 96 % | SYSTOLIC BLOOD PRESSURE: 190 MMHG | DIASTOLIC BLOOD PRESSURE: 88 MMHG | WEIGHT: 224 LBS | RESPIRATION RATE: 16 BRPM | HEART RATE: 73 BPM | TEMPERATURE: 98.1 F

## 2025-05-12 ENCOUNTER — OFFICE VISIT (OUTPATIENT)
Age: 80
End: 2025-05-12
Payer: MEDICARE

## 2025-05-12 VITALS
SYSTOLIC BLOOD PRESSURE: 138 MMHG | OXYGEN SATURATION: 95 % | HEIGHT: 66 IN | DIASTOLIC BLOOD PRESSURE: 70 MMHG | BODY MASS INDEX: 36.32 KG/M2 | WEIGHT: 226 LBS | TEMPERATURE: 98 F | HEART RATE: 71 BPM

## 2025-05-12 DIAGNOSIS — E78.00 PURE HYPERCHOLESTEROLEMIA: ICD-10-CM

## 2025-05-12 DIAGNOSIS — I48.0 PAROXYSMAL A-FIB (HCC): ICD-10-CM

## 2025-05-12 DIAGNOSIS — J45.40 MODERATE PERSISTENT ASTHMA WITHOUT COMPLICATION: ICD-10-CM

## 2025-05-12 DIAGNOSIS — I10 ESSENTIAL HYPERTENSION: Primary | ICD-10-CM

## 2025-05-12 DIAGNOSIS — F33.2 SEVERE EPISODE OF RECURRENT MAJOR DEPRESSIVE DISORDER, WITHOUT PSYCHOTIC FEATURES (HCC): ICD-10-CM

## 2025-05-12 PROCEDURE — 3078F DIAST BP <80 MM HG: CPT | Performed by: FAMILY MEDICINE

## 2025-05-12 PROCEDURE — 1090F PRES/ABSN URINE INCON ASSESS: CPT | Performed by: FAMILY MEDICINE

## 2025-05-12 PROCEDURE — 1036F TOBACCO NON-USER: CPT | Performed by: FAMILY MEDICINE

## 2025-05-12 PROCEDURE — 99213 OFFICE O/P EST LOW 20 MIN: CPT | Performed by: FAMILY MEDICINE

## 2025-05-12 PROCEDURE — 3075F SYST BP GE 130 - 139MM HG: CPT | Performed by: FAMILY MEDICINE

## 2025-05-12 PROCEDURE — G8417 CALC BMI ABV UP PARAM F/U: HCPCS | Performed by: FAMILY MEDICINE

## 2025-05-12 PROCEDURE — 1160F RVW MEDS BY RX/DR IN RCRD: CPT | Performed by: FAMILY MEDICINE

## 2025-05-12 PROCEDURE — 1159F MED LIST DOCD IN RCRD: CPT | Performed by: FAMILY MEDICINE

## 2025-05-12 PROCEDURE — G8427 DOCREV CUR MEDS BY ELIG CLIN: HCPCS | Performed by: FAMILY MEDICINE

## 2025-05-12 PROCEDURE — G8399 PT W/DXA RESULTS DOCUMENT: HCPCS | Performed by: FAMILY MEDICINE

## 2025-05-12 PROCEDURE — 1123F ACP DISCUSS/DSCN MKR DOCD: CPT | Performed by: FAMILY MEDICINE

## 2025-05-12 PROCEDURE — 99214 OFFICE O/P EST MOD 30 MIN: CPT | Performed by: FAMILY MEDICINE

## 2025-05-12 ASSESSMENT — PATIENT HEALTH QUESTIONNAIRE - PHQ9
7. TROUBLE CONCENTRATING ON THINGS, SUCH AS READING THE NEWSPAPER OR WATCHING TELEVISION: NOT AT ALL
1. LITTLE INTEREST OR PLEASURE IN DOING THINGS: NOT AT ALL
3. TROUBLE FALLING OR STAYING ASLEEP: NOT AT ALL
2. FEELING DOWN, DEPRESSED OR HOPELESS: NOT AT ALL
SUM OF ALL RESPONSES TO PHQ QUESTIONS 1-9: 0
SUM OF ALL RESPONSES TO PHQ QUESTIONS 1-9: 0
9. THOUGHTS THAT YOU WOULD BE BETTER OFF DEAD, OR OF HURTING YOURSELF: NOT AT ALL
8. MOVING OR SPEAKING SO SLOWLY THAT OTHER PEOPLE COULD HAVE NOTICED. OR THE OPPOSITE, BEING SO FIGETY OR RESTLESS THAT YOU HAVE BEEN MOVING AROUND A LOT MORE THAN USUAL: NOT AT ALL
SUM OF ALL RESPONSES TO PHQ QUESTIONS 1-9: 0
SUM OF ALL RESPONSES TO PHQ QUESTIONS 1-9: 0
10. IF YOU CHECKED OFF ANY PROBLEMS, HOW DIFFICULT HAVE THESE PROBLEMS MADE IT FOR YOU TO DO YOUR WORK, TAKE CARE OF THINGS AT HOME, OR GET ALONG WITH OTHER PEOPLE: NOT DIFFICULT AT ALL
5. POOR APPETITE OR OVEREATING: NOT AT ALL
6. FEELING BAD ABOUT YOURSELF - OR THAT YOU ARE A FAILURE OR HAVE LET YOURSELF OR YOUR FAMILY DOWN: NOT AT ALL
4. FEELING TIRED OR HAVING LITTLE ENERGY: NOT AT ALL

## 2025-05-12 ASSESSMENT — ENCOUNTER SYMPTOMS
VOMITING: 0
DIARRHEA: 0
COUGH: 0

## 2025-05-12 NOTE — PROGRESS NOTES
Subjective:      Patient ID: Yisel Dai is a 80 y.o. female    Hypertension  The current episode started more than 1 year ago. Past treatments include alpha 1 blockers and beta blockers. The current treatment provides moderate improvement.   Hyperlipidemia  The current episode started more than 1 year ago. The problem is resistant. Current antihyperlipidemic treatment includes ezetimibe. The current treatment provides mild improvement of lipids.   Depression  Visit Type: follow-up  Frequency of symptoms: occasionally        Here in follow up for htn and lipids and depression and asthma.  Weight about the same as last time.  Has not been using advair regularly.   Depression stable with cymbalta.  No other missed doses of medications.   Having implant placed for bladder control later this week.    Review of Systems   Constitutional:  Negative for chills and fever.   Respiratory:  Negative for cough.    Gastrointestinal:  Negative for diarrhea and vomiting.   Neurological:  Negative for weakness.   Psychiatric/Behavioral:  Positive for depression.      Reviewed allergy, medical, social, surgical, family and med list changes and updated   Files     Social History     Socioeconomic History    Marital status:    Tobacco Use    Smoking status: Never    Smokeless tobacco: Never   Vaping Use    Vaping status: Never Used   Substance and Sexual Activity    Alcohol use: Never    Drug use: No    Sexual activity: Not Currently   Social History Narrative    ** Merged History Encounter **          Social Drivers of Health     Financial Resource Strain: Low Risk  (5/15/2024)    Overall Financial Resource Strain (CARDIA)     Difficulty of Paying Living Expenses: Not hard at all   Food Insecurity: No Food Insecurity (3/21/2025)    Hunger Vital Sign     Worried About Running Out of Food in the Last Year: Never true     Ran Out of Food in the Last Year: Never true   Transportation Needs: No Transportation Needs (3/21/2025)

## 2025-05-15 ENCOUNTER — PREP FOR PROCEDURE (OUTPATIENT)
Dept: OBGYN CLINIC | Age: 80
End: 2025-05-15

## 2025-05-15 ENCOUNTER — HOSPITAL ENCOUNTER (OUTPATIENT)
Age: 80
Setting detail: OUTPATIENT SURGERY
Discharge: HOME | End: 2025-05-15
Attending: OBSTETRICS & GYNECOLOGY | Admitting: OBSTETRICS & GYNECOLOGY
Payer: MEDICARE

## 2025-05-15 RX ORDER — SODIUM CHLORIDE 9 MG/ML
INJECTION, SOLUTION INTRAVENOUS PRN
Status: DISCONTINUED | OUTPATIENT
Start: 2025-05-15 | End: 2025-05-16 | Stop reason: HOSPADM

## 2025-05-15 RX ORDER — SODIUM CHLORIDE 9 MG/ML
INJECTION, SOLUTION INTRAVENOUS PRN
Status: CANCELLED | OUTPATIENT
Start: 2025-05-15

## 2025-05-15 RX ORDER — SODIUM CHLORIDE, SODIUM LACTATE, POTASSIUM CHLORIDE, CALCIUM CHLORIDE 600; 310; 30; 20 MG/100ML; MG/100ML; MG/100ML; MG/100ML
INJECTION, SOLUTION INTRAVENOUS CONTINUOUS
Status: CANCELLED | OUTPATIENT
Start: 2025-05-15

## 2025-05-15 RX ORDER — SODIUM CHLORIDE 0.9 % (FLUSH) 0.9 %
5-40 SYRINGE (ML) INJECTION EVERY 12 HOURS SCHEDULED
Status: DISCONTINUED | OUTPATIENT
Start: 2025-05-15 | End: 2025-05-16 | Stop reason: HOSPADM

## 2025-05-15 RX ORDER — SODIUM CHLORIDE 0.9 % (FLUSH) 0.9 %
5-40 SYRINGE (ML) INJECTION EVERY 12 HOURS SCHEDULED
Status: CANCELLED | OUTPATIENT
Start: 2025-05-15

## 2025-05-15 RX ORDER — SODIUM CHLORIDE, SODIUM LACTATE, POTASSIUM CHLORIDE, CALCIUM CHLORIDE 600; 310; 30; 20 MG/100ML; MG/100ML; MG/100ML; MG/100ML
INJECTION, SOLUTION INTRAVENOUS CONTINUOUS
Status: DISCONTINUED | OUTPATIENT
Start: 2025-05-15 | End: 2025-05-16 | Stop reason: HOSPADM

## 2025-05-15 RX ORDER — SODIUM CHLORIDE 0.9 % (FLUSH) 0.9 %
5-40 SYRINGE (ML) INJECTION PRN
Status: DISCONTINUED | OUTPATIENT
Start: 2025-05-15 | End: 2025-05-16 | Stop reason: HOSPADM

## 2025-05-15 RX ORDER — SODIUM CHLORIDE 0.9 % (FLUSH) 0.9 %
5-40 SYRINGE (ML) INJECTION PRN
Status: CANCELLED | OUTPATIENT
Start: 2025-05-15

## 2025-05-15 NOTE — PROGRESS NOTES
Patient updated via phone call about delays several times. Ultimately patient procedure cancelled. Patient verbalized understanding.  To be rescheduled at another date.

## 2025-05-16 ENCOUNTER — TELEPHONE (OUTPATIENT)
Dept: OBGYN CLINIC | Age: 80
End: 2025-05-16

## 2025-05-16 PROCEDURE — 2709999900 HC NON-CHARGEABLE SUPPLY: Performed by: OBSTETRICS & GYNECOLOGY

## 2025-05-16 PROCEDURE — C1897 LEAD, NEUROSTIM TEST KIT: HCPCS | Performed by: OBSTETRICS & GYNECOLOGY

## 2025-05-16 PROCEDURE — C1883 ADAPT/EXT, PACING/NEURO LEAD: HCPCS | Performed by: OBSTETRICS & GYNECOLOGY

## 2025-05-16 PROCEDURE — C1787 PATIENT PROGR, NEUROSTIM: HCPCS | Performed by: OBSTETRICS & GYNECOLOGY

## 2025-05-19 RX ORDER — SODIUM CHLORIDE 0.9 % (FLUSH) 0.9 %
5-40 SYRINGE (ML) INJECTION EVERY 12 HOURS SCHEDULED
Status: CANCELLED | OUTPATIENT
Start: 2025-05-19

## 2025-05-19 RX ORDER — SODIUM CHLORIDE, SODIUM LACTATE, POTASSIUM CHLORIDE, CALCIUM CHLORIDE 600; 310; 30; 20 MG/100ML; MG/100ML; MG/100ML; MG/100ML
INJECTION, SOLUTION INTRAVENOUS CONTINUOUS
Status: CANCELLED | OUTPATIENT
Start: 2025-05-19

## 2025-05-19 RX ORDER — SODIUM CHLORIDE 0.9 % (FLUSH) 0.9 %
5-40 SYRINGE (ML) INJECTION PRN
Status: CANCELLED | OUTPATIENT
Start: 2025-05-19

## 2025-05-19 RX ORDER — SODIUM CHLORIDE 9 MG/ML
INJECTION, SOLUTION INTRAVENOUS PRN
Status: CANCELLED | OUTPATIENT
Start: 2025-05-19

## 2025-06-03 ENCOUNTER — SCHEDULED TELEPHONE ENCOUNTER (OUTPATIENT)
Dept: OBGYN CLINIC | Age: 80
End: 2025-06-03
Payer: MEDICARE

## 2025-06-03 DIAGNOSIS — N32.81 OAB (OVERACTIVE BLADDER): Primary | ICD-10-CM

## 2025-06-03 PROCEDURE — 99447 NTRPROF PH1/NTRNET/EHR 11-20: CPT | Performed by: OBSTETRICS & GYNECOLOGY

## 2025-06-04 ENCOUNTER — TELEPHONE (OUTPATIENT)
Dept: OBGYN CLINIC | Age: 80
End: 2025-06-04

## 2025-06-04 NOTE — PROGRESS NOTES
On this date 6/3/2025 I have spent 15 minutes reviewing previous notes, test results, and other pertinent medical information with the patient, discussing the diagnosis and importance of compliance with the treatment plan as well as documenting on the day of the visit.    Yisel Dai was evaluated through a synchronous (real-time) audio encounter. Patient identification was verified at the start of the visit. She (or guardian if applicable) is aware that this is a billable service, which includes applicable co-pays. This visit was conducted with the patient's (and/or legal guardian's) verbal consent. She has not had a related appointment within my department in the past 7 days or scheduled within the next 24 hours.   The patient was located at Home: [address] [address].  The provider was located at Facility (Appt Dept): [address] [place] [address].  Confirm you are appropriately licensed, registered, or certified to deliver care in the state where the patient is located as indicated above. If you are not or unsure, please re-schedule the visit: Yes, I confirm.     Note: not billable if this call serves to triage the patient into an appointment for the relevant concern    Yisel Dai is a 80 y.o. female evaluated via telephone on 6/3/2025 for No chief complaint on file.  .           Yisel Dai is a 80 y.o. female who presents here today for complaints of No chief complaint on file.    Interstim Device malfunction confirmed by XRAY with lead detachment noted .         Vitals:  LMP  (LMP Unknown)   Allergies:  Floxin [ofloxacin], Praluent [alirocumab], Questran [cholestyramine], Lovaza [omega-3-acid ethyl esters (fish)], Niacin and related, Statins, Tobramycin, and Tricor [fenofibrate]  Past Medical History:   Diagnosis Date    Arthritis     Asthma     Chronic back pain Years    Depression Many years    Headache Off and on    Hearing loss Not bad    Hyperlipidemia     Hypertension

## 2025-06-11 ENCOUNTER — ANESTHESIA EVENT (OUTPATIENT)
Dept: OPERATING ROOM | Age: 80
End: 2025-06-11
Payer: MEDICARE

## 2025-06-12 ENCOUNTER — APPOINTMENT (OUTPATIENT)
Dept: GENERAL RADIOLOGY | Age: 80
End: 2025-06-12
Attending: OBSTETRICS & GYNECOLOGY
Payer: MEDICARE

## 2025-06-12 ENCOUNTER — HOSPITAL ENCOUNTER (OUTPATIENT)
Age: 80
Setting detail: OUTPATIENT SURGERY
Discharge: HOME | End: 2025-06-12
Attending: OBSTETRICS & GYNECOLOGY | Admitting: OBSTETRICS & GYNECOLOGY
Payer: MEDICARE

## 2025-06-12 ENCOUNTER — ANESTHESIA (OUTPATIENT)
Dept: OPERATING ROOM | Age: 80
End: 2025-06-12
Payer: MEDICARE

## 2025-06-12 VITALS
WEIGHT: 224 LBS | HEART RATE: 82 BPM | BODY MASS INDEX: 36 KG/M2 | SYSTOLIC BLOOD PRESSURE: 137 MMHG | HEIGHT: 66 IN | DIASTOLIC BLOOD PRESSURE: 68 MMHG | TEMPERATURE: 97.7 F | OXYGEN SATURATION: 99 % | RESPIRATION RATE: 18 BRPM

## 2025-06-12 DIAGNOSIS — R52 PAIN: ICD-10-CM

## 2025-06-12 DIAGNOSIS — G89.18 POSTOPERATIVE PAIN: Primary | ICD-10-CM

## 2025-06-12 LAB
ANION GAP SERPL CALCULATED.3IONS-SCNC: 13 MEQ/L (ref 9–15)
BUN SERPL-MCNC: 11 MG/DL (ref 8–23)
CALCIUM SERPL-MCNC: 8.9 MG/DL (ref 8.5–9.9)
CHLORIDE SERPL-SCNC: 98 MEQ/L (ref 95–107)
CO2 SERPL-SCNC: 27 MEQ/L (ref 20–31)
CREAT SERPL-MCNC: 0.67 MG/DL (ref 0.5–0.9)
ERYTHROCYTE [DISTWIDTH] IN BLOOD BY AUTOMATED COUNT: 13.2 % (ref 11.5–14.5)
GLUCOSE SERPL-MCNC: 123 MG/DL (ref 70–99)
HCT VFR BLD AUTO: 36.8 % (ref 37–47)
HGB BLD-MCNC: 12.4 G/DL (ref 12–16)
MCH RBC QN AUTO: 30.1 PG (ref 27–31.3)
MCHC RBC AUTO-ENTMCNC: 33.7 % (ref 33–37)
MCV RBC AUTO: 89.3 FL (ref 79.4–94.8)
PLATELET # BLD AUTO: 167 K/UL (ref 130–400)
POTASSIUM SERPL-SCNC: 3.7 MEQ/L (ref 3.4–4.9)
RBC # BLD AUTO: 4.12 M/UL (ref 4.2–5.4)
SODIUM SERPL-SCNC: 138 MEQ/L (ref 135–144)
WBC # BLD AUTO: 6.9 K/UL (ref 4.8–10.8)

## 2025-06-12 PROCEDURE — 2500000003 HC RX 250 WO HCPCS: Performed by: OBSTETRICS & GYNECOLOGY

## 2025-06-12 PROCEDURE — 6360000002 HC RX W HCPCS: Performed by: OBSTETRICS & GYNECOLOGY

## 2025-06-12 PROCEDURE — 6360000002 HC RX W HCPCS: Performed by: STUDENT IN AN ORGANIZED HEALTH CARE EDUCATION/TRAINING PROGRAM

## 2025-06-12 PROCEDURE — 7100000011 HC PHASE II RECOVERY - ADDTL 15 MIN: Performed by: OBSTETRICS & GYNECOLOGY

## 2025-06-12 PROCEDURE — 80048 BASIC METABOLIC PNL TOTAL CA: CPT

## 2025-06-12 PROCEDURE — 2580000003 HC RX 258: Performed by: OBSTETRICS & GYNECOLOGY

## 2025-06-12 PROCEDURE — C1897 LEAD, NEUROSTIM TEST KIT: HCPCS | Performed by: OBSTETRICS & GYNECOLOGY

## 2025-06-12 PROCEDURE — 64590 INS/RPL PRPH SAC/GSTR NPG/R: CPT | Performed by: OBSTETRICS & GYNECOLOGY

## 2025-06-12 PROCEDURE — 7100000000 HC PACU RECOVERY - FIRST 15 MIN: Performed by: OBSTETRICS & GYNECOLOGY

## 2025-06-12 PROCEDURE — 6360000002 HC RX W HCPCS

## 2025-06-12 PROCEDURE — C1883 ADAPT/EXT, PACING/NEURO LEAD: HCPCS | Performed by: OBSTETRICS & GYNECOLOGY

## 2025-06-12 PROCEDURE — 64561 IMPLANT NEUROELECTRODES: CPT | Performed by: OBSTETRICS & GYNECOLOGY

## 2025-06-12 PROCEDURE — C1889 IMPLANT/INSERT DEVICE, NOC: HCPCS | Performed by: OBSTETRICS & GYNECOLOGY

## 2025-06-12 PROCEDURE — 3600000014 HC SURGERY LEVEL 4 ADDTL 15MIN: Performed by: OBSTETRICS & GYNECOLOGY

## 2025-06-12 PROCEDURE — 2709999900 HC NON-CHARGEABLE SUPPLY: Performed by: OBSTETRICS & GYNECOLOGY

## 2025-06-12 PROCEDURE — 3700000000 HC ANESTHESIA ATTENDED CARE: Performed by: OBSTETRICS & GYNECOLOGY

## 2025-06-12 PROCEDURE — 2500000003 HC RX 250 WO HCPCS

## 2025-06-12 PROCEDURE — C1767 GENERATOR, NEURO NON-RECHARG: HCPCS | Performed by: OBSTETRICS & GYNECOLOGY

## 2025-06-12 PROCEDURE — 3600000004 HC SURGERY LEVEL 4 BASE: Performed by: OBSTETRICS & GYNECOLOGY

## 2025-06-12 PROCEDURE — C1787 PATIENT PROGR, NEUROSTIM: HCPCS | Performed by: OBSTETRICS & GYNECOLOGY

## 2025-06-12 PROCEDURE — 3700000001 HC ADD 15 MINUTES (ANESTHESIA): Performed by: OBSTETRICS & GYNECOLOGY

## 2025-06-12 PROCEDURE — 6370000000 HC RX 637 (ALT 250 FOR IP): Performed by: OBSTETRICS & GYNECOLOGY

## 2025-06-12 PROCEDURE — 7100000001 HC PACU RECOVERY - ADDTL 15 MIN: Performed by: OBSTETRICS & GYNECOLOGY

## 2025-06-12 PROCEDURE — C1778 LEAD, NEUROSTIMULATOR: HCPCS | Performed by: OBSTETRICS & GYNECOLOGY

## 2025-06-12 PROCEDURE — 7100000010 HC PHASE II RECOVERY - FIRST 15 MIN: Performed by: OBSTETRICS & GYNECOLOGY

## 2025-06-12 PROCEDURE — 6370000000 HC RX 637 (ALT 250 FOR IP)

## 2025-06-12 PROCEDURE — 95972 ALYS CPLX SP/PN NPGT W/PRGRM: CPT | Performed by: OBSTETRICS & GYNECOLOGY

## 2025-06-12 PROCEDURE — 85027 COMPLETE CBC AUTOMATED: CPT

## 2025-06-12 DEVICE — ENVELOPE PLSE GENRTR M W2.7XL2.5IN NEURO ANTIBACT ABSRB: Type: IMPLANTABLE DEVICE | Site: BACK | Status: FUNCTIONAL

## 2025-06-12 DEVICE — LEAD NERVE STIM 4.32 MM INTERSTIM SURESCAN: Type: IMPLANTABLE DEVICE | Site: SACRUM | Status: FUNCTIONAL

## 2025-06-12 DEVICE — NEUROSTIMULATOR INTERSTIM X RECHRGE FREE TORQ WRNCH PROD: Type: IMPLANTABLE DEVICE | Site: BACK | Status: FUNCTIONAL

## 2025-06-12 RX ORDER — FENTANYL CITRATE 0.05 MG/ML
50 INJECTION, SOLUTION INTRAMUSCULAR; INTRAVENOUS EVERY 10 MIN PRN
Status: DISCONTINUED | OUTPATIENT
Start: 2025-06-12 | End: 2025-06-12 | Stop reason: HOSPADM

## 2025-06-12 RX ORDER — SODIUM CHLORIDE 9 MG/ML
INJECTION, SOLUTION INTRAVENOUS PRN
Status: DISCONTINUED | OUTPATIENT
Start: 2025-06-12 | End: 2025-06-12 | Stop reason: HOSPADM

## 2025-06-12 RX ORDER — MAGNESIUM HYDROXIDE 1200 MG/15ML
LIQUID ORAL CONTINUOUS PRN
Status: COMPLETED | OUTPATIENT
Start: 2025-06-12 | End: 2025-06-12

## 2025-06-12 RX ORDER — SUCCINYLCHOLINE/SOD CL,ISO/PF 100 MG/5ML
SYRINGE (ML) INTRAVENOUS
Status: DISCONTINUED | OUTPATIENT
Start: 2025-06-12 | End: 2025-06-12 | Stop reason: SDUPTHER

## 2025-06-12 RX ORDER — ACETAMINOPHEN 500 MG
1000 TABLET ORAL EVERY 8 HOURS PRN
Status: DISCONTINUED | OUTPATIENT
Start: 2025-06-12 | End: 2025-06-12 | Stop reason: HOSPADM

## 2025-06-12 RX ORDER — ONDANSETRON 2 MG/ML
4 INJECTION INTRAMUSCULAR; INTRAVENOUS EVERY 6 HOURS PRN
Status: DISCONTINUED | OUTPATIENT
Start: 2025-06-12 | End: 2025-06-12 | Stop reason: HOSPADM

## 2025-06-12 RX ORDER — SULFAMETHOXAZOLE AND TRIMETHOPRIM 800; 160 MG/1; MG/1
1 TABLET ORAL 2 TIMES DAILY
Qty: 14 TABLET | Refills: 0 | Status: SHIPPED | OUTPATIENT
Start: 2025-06-12 | End: 2025-06-19

## 2025-06-12 RX ORDER — SODIUM CHLORIDE, SODIUM LACTATE, POTASSIUM CHLORIDE, CALCIUM CHLORIDE 600; 310; 30; 20 MG/100ML; MG/100ML; MG/100ML; MG/100ML
INJECTION, SOLUTION INTRAVENOUS CONTINUOUS
Status: DISCONTINUED | OUTPATIENT
Start: 2025-06-12 | End: 2025-06-12 | Stop reason: HOSPADM

## 2025-06-12 RX ORDER — SODIUM CHLORIDE 0.9 % (FLUSH) 0.9 %
5-40 SYRINGE (ML) INJECTION EVERY 12 HOURS SCHEDULED
Status: DISCONTINUED | OUTPATIENT
Start: 2025-06-12 | End: 2025-06-12 | Stop reason: HOSPADM

## 2025-06-12 RX ORDER — ONDANSETRON 4 MG/1
4 TABLET, ORALLY DISINTEGRATING ORAL EVERY 8 HOURS PRN
Status: DISCONTINUED | OUTPATIENT
Start: 2025-06-12 | End: 2025-06-12 | Stop reason: HOSPADM

## 2025-06-12 RX ORDER — DIPHENHYDRAMINE HYDROCHLORIDE 50 MG/ML
12.5 INJECTION, SOLUTION INTRAMUSCULAR; INTRAVENOUS
Status: DISCONTINUED | OUTPATIENT
Start: 2025-06-12 | End: 2025-06-12 | Stop reason: HOSPADM

## 2025-06-12 RX ORDER — ONDANSETRON 2 MG/ML
INJECTION INTRAMUSCULAR; INTRAVENOUS
Status: DISCONTINUED | OUTPATIENT
Start: 2025-06-12 | End: 2025-06-12 | Stop reason: SDUPTHER

## 2025-06-12 RX ORDER — LIDOCAINE HYDROCHLORIDE AND EPINEPHRINE 10; 10 MG/ML; UG/ML
INJECTION, SOLUTION INFILTRATION; PERINEURAL PRN
Status: DISCONTINUED | OUTPATIENT
Start: 2025-06-12 | End: 2025-06-12 | Stop reason: ALTCHOICE

## 2025-06-12 RX ORDER — METOCLOPRAMIDE HYDROCHLORIDE 5 MG/ML
10 INJECTION INTRAMUSCULAR; INTRAVENOUS
Status: DISCONTINUED | OUTPATIENT
Start: 2025-06-12 | End: 2025-06-12 | Stop reason: HOSPADM

## 2025-06-12 RX ORDER — SODIUM CHLORIDE 0.9 % (FLUSH) 0.9 %
5-40 SYRINGE (ML) INJECTION PRN
Status: DISCONTINUED | OUTPATIENT
Start: 2025-06-12 | End: 2025-06-12 | Stop reason: HOSPADM

## 2025-06-12 RX ORDER — FAMOTIDINE 20 MG/1
20 TABLET, FILM COATED ORAL 2 TIMES DAILY
Status: DISCONTINUED | OUTPATIENT
Start: 2025-06-12 | End: 2025-06-12 | Stop reason: HOSPADM

## 2025-06-12 RX ORDER — GLYCOPYRROLATE 0.2 MG/ML
INJECTION INTRAMUSCULAR; INTRAVENOUS
Status: DISCONTINUED | OUTPATIENT
Start: 2025-06-12 | End: 2025-06-12 | Stop reason: SDUPTHER

## 2025-06-12 RX ORDER — OXYCODONE HYDROCHLORIDE 5 MG/1
5 TABLET ORAL
Status: DISCONTINUED | OUTPATIENT
Start: 2025-06-12 | End: 2025-06-12 | Stop reason: HOSPADM

## 2025-06-12 RX ORDER — DEXAMETHASONE SODIUM PHOSPHATE 10 MG/ML
INJECTION, SOLUTION INTRA-ARTICULAR; INTRALESIONAL; INTRAMUSCULAR; INTRAVENOUS; SOFT TISSUE
Status: DISCONTINUED | OUTPATIENT
Start: 2025-06-12 | End: 2025-06-12 | Stop reason: SDUPTHER

## 2025-06-12 RX ORDER — ALBUTEROL SULFATE 90 UG/1
INHALANT RESPIRATORY (INHALATION)
Status: DISCONTINUED | OUTPATIENT
Start: 2025-06-12 | End: 2025-06-12 | Stop reason: SDUPTHER

## 2025-06-12 RX ORDER — KETOROLAC TROMETHAMINE 30 MG/ML
30 INJECTION, SOLUTION INTRAMUSCULAR; INTRAVENOUS EVERY 6 HOURS
Status: DISCONTINUED | OUTPATIENT
Start: 2025-06-12 | End: 2025-06-12 | Stop reason: HOSPADM

## 2025-06-12 RX ORDER — HYDRALAZINE HYDROCHLORIDE 20 MG/ML
10 INJECTION INTRAMUSCULAR; INTRAVENOUS ONCE
Status: COMPLETED | OUTPATIENT
Start: 2025-06-12 | End: 2025-06-12

## 2025-06-12 RX ORDER — HYDROMORPHONE HYDROCHLORIDE 1 MG/ML
1 INJECTION, SOLUTION INTRAMUSCULAR; INTRAVENOUS; SUBCUTANEOUS
Status: DISCONTINUED | OUTPATIENT
Start: 2025-06-12 | End: 2025-06-12 | Stop reason: HOSPADM

## 2025-06-12 RX ORDER — OXYCODONE HYDROCHLORIDE 5 MG/1
5 TABLET ORAL EVERY 4 HOURS PRN
Status: DISCONTINUED | OUTPATIENT
Start: 2025-06-12 | End: 2025-06-12 | Stop reason: HOSPADM

## 2025-06-12 RX ORDER — OXYCODONE AND ACETAMINOPHEN 5; 325 MG/1; MG/1
2 TABLET ORAL NIGHTLY PRN
Qty: 10 TABLET | Refills: 0 | Status: SHIPPED | OUTPATIENT
Start: 2025-06-12 | End: 2025-06-17

## 2025-06-12 RX ORDER — MEPERIDINE HYDROCHLORIDE 25 MG/ML
12.5 INJECTION INTRAMUSCULAR; INTRAVENOUS; SUBCUTANEOUS
Status: DISCONTINUED | OUTPATIENT
Start: 2025-06-12 | End: 2025-06-12 | Stop reason: HOSPADM

## 2025-06-12 RX ORDER — EPHEDRINE SULFATE 50 MG/ML
INJECTION, SOLUTION INTRAVENOUS
Status: DISCONTINUED | OUTPATIENT
Start: 2025-06-12 | End: 2025-06-12 | Stop reason: SDUPTHER

## 2025-06-12 RX ORDER — IBUPROFEN 600 MG/1
600 TABLET, FILM COATED ORAL EVERY 6 HOURS PRN
Qty: 60 TABLET | Refills: 1 | Status: SHIPPED | OUTPATIENT
Start: 2025-06-12

## 2025-06-12 RX ORDER — ONDANSETRON 2 MG/ML
4 INJECTION INTRAMUSCULAR; INTRAVENOUS
Status: DISCONTINUED | OUTPATIENT
Start: 2025-06-12 | End: 2025-06-12 | Stop reason: HOSPADM

## 2025-06-12 RX ORDER — OXYCODONE HYDROCHLORIDE 5 MG/1
10 TABLET ORAL EVERY 4 HOURS PRN
Status: DISCONTINUED | OUTPATIENT
Start: 2025-06-12 | End: 2025-06-12 | Stop reason: HOSPADM

## 2025-06-12 RX ORDER — GENTAMICIN 40 MG/ML
INJECTION, SOLUTION INTRAMUSCULAR; INTRAVENOUS PRN
Status: DISCONTINUED | OUTPATIENT
Start: 2025-06-12 | End: 2025-06-12 | Stop reason: ALTCHOICE

## 2025-06-12 RX ORDER — ACETAMINOPHEN 500 MG
1000 TABLET ORAL EVERY 6 HOURS PRN
Qty: 60 TABLET | Refills: 0 | Status: SHIPPED | OUTPATIENT
Start: 2025-06-12

## 2025-06-12 RX ORDER — ALBUTEROL SULFATE 90 UG/1
INHALANT RESPIRATORY (INHALATION)
Status: COMPLETED
Start: 2025-06-12 | End: 2025-06-12

## 2025-06-12 RX ORDER — PROPOFOL 10 MG/ML
INJECTION, EMULSION INTRAVENOUS
Status: DISCONTINUED | OUTPATIENT
Start: 2025-06-12 | End: 2025-06-12 | Stop reason: SDUPTHER

## 2025-06-12 RX ORDER — NALOXONE HYDROCHLORIDE 0.4 MG/ML
INJECTION, SOLUTION INTRAMUSCULAR; INTRAVENOUS; SUBCUTANEOUS PRN
Status: DISCONTINUED | OUTPATIENT
Start: 2025-06-12 | End: 2025-06-12 | Stop reason: HOSPADM

## 2025-06-12 RX ORDER — LIDOCAINE HYDROCHLORIDE 10 MG/ML
1 INJECTION, SOLUTION EPIDURAL; INFILTRATION; INTRACAUDAL; PERINEURAL
Status: DISCONTINUED | OUTPATIENT
Start: 2025-06-12 | End: 2025-06-12 | Stop reason: HOSPADM

## 2025-06-12 RX ORDER — LIDOCAINE HYDROCHLORIDE 20 MG/ML
INJECTION, SOLUTION EPIDURAL; INFILTRATION; INTRACAUDAL; PERINEURAL
Status: DISCONTINUED | OUTPATIENT
Start: 2025-06-12 | End: 2025-06-12 | Stop reason: SDUPTHER

## 2025-06-12 RX ADMIN — PHENYLEPHRINE HYDROCHLORIDE 100 MCG: 10 INJECTION INTRAVENOUS at 13:01

## 2025-06-12 RX ADMIN — DEXAMETHASONE SODIUM PHOSPHATE 10 MG: 10 INJECTION INTRAMUSCULAR; INTRAVENOUS at 12:37

## 2025-06-12 RX ADMIN — EPHEDRINE SULFATE 10 MG: 50 INJECTION, SOLUTION INTRAVENOUS at 13:03

## 2025-06-12 RX ADMIN — GLYCOPYRROLATE 0.2 MG: 0.2 INJECTION INTRAMUSCULAR; INTRAVENOUS at 13:14

## 2025-06-12 RX ADMIN — PHENYLEPHRINE HYDROCHLORIDE 200 MCG: 10 INJECTION INTRAVENOUS at 13:31

## 2025-06-12 RX ADMIN — ONDANSETRON 4 MG: 2 INJECTION, SOLUTION INTRAMUSCULAR; INTRAVENOUS at 13:30

## 2025-06-12 RX ADMIN — EPHEDRINE SULFATE 5 MG: 50 INJECTION, SOLUTION INTRAVENOUS at 13:07

## 2025-06-12 RX ADMIN — PHENYLEPHRINE HYDROCHLORIDE 200 MCG: 10 INJECTION INTRAVENOUS at 13:05

## 2025-06-12 RX ADMIN — Medication 100 MG: at 12:37

## 2025-06-12 RX ADMIN — PHENYLEPHRINE HYDROCHLORIDE 200 MCG: 10 INJECTION INTRAVENOUS at 13:24

## 2025-06-12 RX ADMIN — KETOROLAC TROMETHAMINE 30 MG: 30 INJECTION, SOLUTION INTRAMUSCULAR at 14:49

## 2025-06-12 RX ADMIN — PHENYLEPHRINE HYDROCHLORIDE 200 MCG: 10 INJECTION INTRAVENOUS at 13:14

## 2025-06-12 RX ADMIN — SODIUM CHLORIDE, SODIUM LACTATE, POTASSIUM CHLORIDE, AND CALCIUM CHLORIDE 1000 ML: .6; .31; .03; .02 INJECTION, SOLUTION INTRAVENOUS at 10:34

## 2025-06-12 RX ADMIN — CEFOXITIN SODIUM 2000 MG: 2 POWDER, FOR SOLUTION INTRAVENOUS at 12:48

## 2025-06-12 RX ADMIN — SODIUM CHLORIDE, SODIUM LACTATE, POTASSIUM CHLORIDE, AND CALCIUM CHLORIDE: .6; .31; .03; .02 INJECTION, SOLUTION INTRAVENOUS at 13:30

## 2025-06-12 RX ADMIN — PROPOFOL 200 MG: 10 INJECTION, EMULSION INTRAVENOUS at 12:37

## 2025-06-12 RX ADMIN — EPHEDRINE SULFATE 10 MG: 50 INJECTION, SOLUTION INTRAVENOUS at 13:11

## 2025-06-12 RX ADMIN — PHENYLEPHRINE HYDROCHLORIDE 200 MCG: 10 INJECTION INTRAVENOUS at 13:42

## 2025-06-12 RX ADMIN — LIDOCAINE HYDROCHLORIDE 100 MG: 20 INJECTION, SOLUTION EPIDURAL; INFILTRATION; INTRACAUDAL; PERINEURAL at 12:37

## 2025-06-12 RX ADMIN — PHENYLEPHRINE HYDROCHLORIDE 100 MCG: 10 INJECTION INTRAVENOUS at 13:19

## 2025-06-12 RX ADMIN — PHENYLEPHRINE HYDROCHLORIDE 200 MCG: 10 INJECTION INTRAVENOUS at 13:17

## 2025-06-12 RX ADMIN — HYDRALAZINE HYDROCHLORIDE 10 MG: 20 INJECTION INTRAMUSCULAR; INTRAVENOUS at 14:51

## 2025-06-12 RX ADMIN — PHENYLEPHRINE HYDROCHLORIDE 200 MCG: 10 INJECTION INTRAVENOUS at 13:33

## 2025-06-12 RX ADMIN — OXYCODONE 5 MG: 5 TABLET ORAL at 15:55

## 2025-06-12 RX ADMIN — PHENYLEPHRINE HYDROCHLORIDE 100 MCG: 10 INJECTION INTRAVENOUS at 13:22

## 2025-06-12 RX ADMIN — ALBUTEROL SULFATE 2 PUFF: 90 AEROSOL, METERED RESPIRATORY (INHALATION) at 12:31

## 2025-06-12 ASSESSMENT — PAIN DESCRIPTION - DESCRIPTORS
DESCRIPTORS: ACHING;DISCOMFORT
DESCRIPTORS: DISCOMFORT

## 2025-06-12 ASSESSMENT — PAIN SCALES - GENERAL
PAINLEVEL_OUTOF10: 0
PAINLEVEL_OUTOF10: 0
PAINLEVEL_OUTOF10: 4
PAINLEVEL_OUTOF10: 4
PAINLEVEL_OUTOF10: 0
PAINLEVEL_OUTOF10: 0

## 2025-06-12 ASSESSMENT — PAIN DESCRIPTION - ORIENTATION
ORIENTATION: LEFT;LOWER
ORIENTATION: LOWER;LEFT

## 2025-06-12 ASSESSMENT — PAIN DESCRIPTION - LOCATION
LOCATION: BACK
LOCATION: BACK
LOCATION: INCISION

## 2025-06-12 NOTE — OP NOTE
Operative Note      Patient: Yisel Dai  YOB: 1945  MRN: [id number]    Date of Procedure: 6/12/2025    Pre-Op Diagnosis Codes:      * Other mechanical complication of implanted electronic neurostimulator, generator, initial encounter [T85.193A]    Post-Op Diagnosis:  lead was twisted multiple times and detached from the battery completely which explains the malfunction and is consistent with the x-ray findings .        Procedure(s):  REPLACEMENT MEDTRONIC INTERSTIM STAGE 1 AND STAGE 2    Surgeon(s):  Ramona Mondragon MD    Assistant:   First Assistant: Zoraida Field    Anesthesia: General    Estimated Blood Loss (mL): Minimal    Complications: None    Specimens:   * No specimens in log *    Implants:  Implant Name Type Inv. Item Serial No.  Lot No. LRB No. Used Action   ENVELOPE PLSE GENRTR M W2.7XL2.5IN NEURO ANTIBACT ABSRB - [id number]  ENVELOPE PLSE GENRTR M W2.7XL2.5IN NEURO ANTIBACT ABSRB  [employer] [id number] Left 1 Implanted   LEAD NERVE STIM 4.32 MM INTERSTIM SURESCAN - [id number]  LEAD NERVE STIM 4.32 MM INTERSTIM SURESCAN  [employer] [id number] Left 1 Implanted   NEUROSTIMULATOR INTERSTIM X RECHRGE FREE TORQ WRNCH PROD - [id number]  NEUROSTIMULATOR INTERSTIM X RECHRGE FREE TORQ WRNCH PROD [id number] [employer]  Left 1 Implanted         Drains: * No LDAs found *    Findings:  Infection Present At Time Of Surgery (PATOS) (choose all levels that have infection present):  No infection present  Other Findings: as above     Detailed Description of Procedure:     1) removal : Surgical technique     Patient preparation  The patient is prepped in the normal sterile manner first lying prone under general anaesthesia. IPG dissection It was easy to identify the site of the IPG from the scar of the previous procedure in the lower lumbar quadrant. After locating the site of the IPG, dissection was performed to remove it , a 10 scalpel blade was used to incise  tunneled under the skin and was brought out through an incision on the right upper buttocks at the previous pocket site . Please note that the lidocaine was injected prior to the tunneling. A pouch was created about 1 cm beneath the subcutaneous tissue over the muscle where the actual unit was connected to the lead on the Right upper buttock . Screws were turned and they were dropped. Attention was made to ensure that the lead was all the way in into the InterStim. Irrigation was performed using gentamicin solution after placing the main unit in the pouch. Impedance was checked. Irrigation was again performed with antibiotic irrigation solution. The needle site was closed using 4-0 Monocryl. The pouch was closed using subcutaneous tissue with 2-0 Vicryl and  4-0 Vicryl for subcuticular and dermabond .  Incision covered.     The patient was transferred to PACU in stable condition. Using the clinician , the INS was programmed.  Rate: 14   Pulse Width: 210  Electrode Configuration: C2   Number of available programs: 4  Patient was provided utilization instructions for the patient  prior to discharge.      Ramona Mondragon M.D., F.A.C.O.G      Electronically signed by Ramona Mondragon MD on 6/12/2025 at 1:52 PM

## 2025-06-12 NOTE — ANESTHESIA PRE PROCEDURE
Department of Anesthesiology  Preprocedure Note       Name:  Yisel Dai   Age:  80 y.o.  :  1945                                          MRN:  [id number]         Date:  2025      Surgeon: Surgeon(s):  Ramona Mondragon MD    Procedure: Procedure(s):  REPLACEMENT MEDTRONIC INTERSTIM STAGE 1 AND STAGE 2. ( WILL UPDATE H&P ON ADMIT).e-mailed Estela [facility]    Medications prior to admission:   Prior to Admission medications    Medication Sig Start Date End Date Taking? Authorizing Provider   carvedilol (COREG) 25 MG tablet Take 1 tablet by mouth with breakfast and with evening meal. 25  Yes Yadiel Miramontes MD   ezetimibe (ZETIA) 10 MG tablet Take 1 tablet by mouth daily. 25  Yes Yadiel Miramontes MD   DULoxetine (CYMBALTA) 60 MG extended release capsule Take 1 capsule by mouth daily. 25  Yes Yadiel Miramontes MD   fluticasone-salmeterol (ADVAIR DISKUS) 500-50 MCG/ACT AEPB diskus inhaler Inhale 1 puff into the lungs in the morning and 1 puff in the evening. 24  Yes Yadiel Miramontes MD   fluticasone-salmeterol (ADVAIR DISKUS) 500-50 MCG/ACT AEPB diskus inhaler Inhale 1 puff into the lungs in the morning and 1 puff in the evening. 10/22/24  Yes Yadiel Miramontes MD   doxazosin (CARDURA) 4 MG tablet TAKE 1 TABLET NIGHTLY 10/22/24  Yes Yadiel Miramontes MD   Handicap Placard MISC by Does not apply route Good through 3/18/2029. 3/19/24  Yes Yadiel Miramontes MD   albuterol (PROVENTIL) (2.5 MG/3ML) 0.083% nebulizer solution Take 3 mLs by nebulization every 6 hours as needed for Wheezing 24  Yes Yadiel Miramontes MD   EPINEPHrine (EPIPEN 2-DE) 0.3 MG/0.3ML SOAJ injection Inject 0.3 mLs into the muscle once for 1 dose Use as directed for allergic reaction. Please give generic 6/3/19 6/12/25 Yes Justice Soto MD   Multiple Vitamins-Minerals (MULTIVITAMIN PO) Take by mouth daily    Yes Provider, MD Hung       Current medications:    Current Facility-Administered

## 2025-06-12 NOTE — H&P
Yisel Dai is a 80 y.o. female who presents here today for complaints of No chief complaint on file.         History of Present Illness  The patient presents for evaluation of a device issue.     She reports that her device, which was functioning optimally until 04/2025, has ceased to work. She has not experienced any falls or engaged in activities that could have potentially dislodged the device. She has undergone testing for urinary tract infection. The onset of bedwetting at night prompted her to contact the device company. Despite their efforts to troubleshoot and reprogram the device, there has been no improvement. She recalls an incident where it felt as though she was sitting on the device. She charged the device yesterday and has not undergone any x-ray procedures. The company advised her to seek medical attention.           Vitals:  BP (!) 150/80 (BP Site: Right Upper Arm, Patient Position: Sitting, BP Cuff Size: Large Adult)   Pulse 65   Ht 1.651 m (5' 5\")   Wt 103 kg (227 lb)   LMP  (LMP Unknown)   BMI 37.77 kg/m²   Allergies:  Floxin [ofloxacin], Praluent [alirocumab], Questran [cholestyramine], Lovaza [omega-3-acid ethyl esters (fish)], Niacin and related, Statins, Tobramycin, and Tricor [fenofibrate]  Past Medical History        Past Medical History:   Diagnosis Date    Arthritis      Asthma      Chronic back pain Years    Depression Many years    Headache Off and on    Hearing loss Not bad    Hyperlipidemia      Hypertension      Obesity Years    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    ibuprofen (ADVIL;MOTRIN) 600 mg, Oral, EVERY 6 HOURS PRN    Multiple Vitamins-Minerals (MULTIVITAMIN PO) DAILY    psyllium (METAMUCIL SMOOTH TEXTURE) 58.6 % powder Take 2 teaspoon with 8 to 10 oz water.    sodium-potassium-mag sulfate (SUPREP) 17.5-3.13-1.6 GM/177ML SOLN solution As directed         Patient's medications, allergies, past medical, surgical, social and family histories were reviewed and updated as appropriate.     Review of Systems  As per chief complaint   All other systems reviewed and are negative.     Physical Exam:  Vitals:  BP (!) 150/80 (BP Site: Right Upper Arm, Patient Position: Sitting, BP Cuff Size: Large Adult)   Pulse 65   Ht 1.651 m (5' 5\")   Wt 103 kg (227 lb)   LMP  (LMP Unknown)   BMI 37.77 kg/m²   Lungs: CTAB   Heart : Regular S1/S2, no M/R/G  Abdomen: Soft , NT, ND , + BS   Pelvic exam : deferred      Results        Assessment:        Diagnosis Orders   1. OAB (overactive bladder)  Urinalysis     Culture, Urine     XR SACRUM COCCYX (MIN 2 VIEWS)             Plan:      Assessment & Plan  1. Device malfunction.  An x-ray of the sacral region SHOWS TWISTING OF LEAD AND MIGRATION outside the device . Clearly the reason for the malfunction       Patient here today for interstim replacement . : stage 1 and stage 2 interstim .     Ramona Mondragon M.D., F.A.C.O.G            Ramona Mondragon MD

## 2025-06-12 NOTE — ANESTHESIA POSTPROCEDURE EVALUATION
Department of Anesthesiology  Postprocedure Note    Patient: Yisel Dai  MRN: [id number]  YOB: 1945  Date of evaluation: 6/12/2025    Procedure Summary       Date: 06/12/25 Room / Location: [place] [id number] / [facility]    Anesthesia Start: 1233 Anesthesia Stop: 1426    Procedure: REPLACEMENT MEDTRONIC INTERSTIM STAGE 1 AND STAGE 2 ([place]) Diagnosis:       Other mechanical complication of implanted electronic neurostimulator, generator, initial encounter      (Other mechanical complication of implanted electronic neurostimulator, generator, initial encounter [T85.193A])    Surgeons: Ramona Mondragon MD Responsible Provider: Ludy Michael APRN - CRNA    Anesthesia Type: general ASA Status: 3            Anesthesia Type: No value filed.    Vandana Phase I:      Vandana Phase II:      Anesthesia Post Evaluation    Patient location during evaluation: bedside  Patient participation: complete - patient participated  Level of consciousness: awake and awake and alert  Airway patency: patent  Nausea & Vomiting: no nausea and no vomiting  Cardiovascular status: blood pressure returned to baseline and hemodynamically stable  Respiratory status: acceptable  Hydration status: euvolemic  Pain management: adequate        No notable events documented.

## 2025-06-12 NOTE — DISCHARGE INSTRUCTIONS
Keep incision and dressing dry and clean   Can shower, NO bathing or water immersion at incision   If dressing gets wet , please remove dressing and leave incision open to air , do not recover . [facility]  Outpatient Discharge Instructions    To continue your care at home, please follow the instructions below and any additional discharge instructions given to you by your physician.    GENERAL ANESTHESIA:  Do not drive or operate machinery for 24hrs after discharge,  Do not drink alcohol, take tranquilizers, sleeping medication, or any other medication not directly instructed by your physician,   Do not make any important decisions or sign any legal documents for 24hrs after surgery,  Have someone with you for 24hrs after surgery to assist you as needed.    ACTIVITY:  Light activity for 24hrs,  No heavy lifting or exercise until instructed by your physician,  You may resume normal activities once instructed by your physician,  Special Instruction: ___________________________________________________________________    FLUIDS AND DIET:  An upset stomach or feeling sick (nausea) can commonly occur after surgery and/or pain medication use. To help minimize nausea:  Do not eat a heavy meal soon after your surgery,    Start with water or other clear liquids,  Advance to mild or bland items like Jell-O, dry toast, crackers, etc.,  Avoid caffeine,  Do not drink alcohol for at least 24 hours after surgery,  Your physician may prescribe anti-nausea medication if your nausea continues,  If you are free from nausea for 24hrs, you can advance to your normal diet as tolerated.    OPERATIVE SITE:  A small amount of bleeding or drainage after surgery is normal. Your physician will provide you with specific instructions on how to care for your surgical site and/or dressing.   Try not to touch your surgical site unless necessary,   Always wash your hands BEFORE and AFTER changing your dressing if instructed by your  physician,   Proper handwashing includes wetting your hands with clean water, applying soap, lathering your hands by rubbing them together with soap for 20 seconds, rinsing them with clean water, and drying them with a clean towel. If soap and water is not available, alcohol-based  may be applied by rubbing the hands together and allowing them to dry for 20 seconds.  Special Instructions: __________________________________________________________________      PAIN:  Pain after surgery is normal and should be expected. When you go home, the anesthesia wears off, and you may experience increased discomfort. Your provider will give you specific instructions on what pain medication to take at home. Listed below is additional information on treating pain after surgery:  Pain medication can give you an upset stomach. Unless your provider has instructed you not to eat or drink, the pain medication should be taken with a small amount of food. Eating will decrease the chance of an upset stomach.  Pain medication can take about 20-30 minutes to start working, so do not wait until the pain worsens before taking a dose. Remember, always take over-the-counter and prescription drugs only as directed by your provider.  Unless otherwise instructed by your provider, applying ice on or around your surgical site can be a great way to help minimize pain and swelling after surgery.  Apply ice to the affected area a minimum of 4 times daily for no longer than 15-20 minutes at a time. It is very important to protect your skin by NOT applying ice or ice pack directly to your skin. Always have a towel or pillow case between your skin and the ice. Frozen vegetable packs like peas or corn make great inexpensive alternatives for use as an icepack.    FOLLOW UP CARE - Call your Physician if any of the following occur:  Increased swelling, redness, warmth, hardness around operative area,  Blood soaked dressings (small amounts of oozing

## 2025-06-30 ENCOUNTER — OFFICE VISIT (OUTPATIENT)
Dept: OBGYN CLINIC | Age: 80
End: 2025-06-30

## 2025-06-30 VITALS
DIASTOLIC BLOOD PRESSURE: 62 MMHG | HEART RATE: 76 BPM | HEIGHT: 65 IN | BODY MASS INDEX: 37.49 KG/M2 | SYSTOLIC BLOOD PRESSURE: 126 MMHG | WEIGHT: 225 LBS

## 2025-06-30 DIAGNOSIS — N32.81 OAB (OVERACTIVE BLADDER): ICD-10-CM

## 2025-06-30 DIAGNOSIS — Z09 POSTOP CHECK: Primary | ICD-10-CM

## 2025-07-09 DIAGNOSIS — F33.2 SEVERE EPISODE OF RECURRENT MAJOR DEPRESSIVE DISORDER, WITHOUT PSYCHOTIC FEATURES (HCC): ICD-10-CM

## 2025-07-09 DIAGNOSIS — E78.00 PURE HYPERCHOLESTEROLEMIA: ICD-10-CM

## 2025-07-09 RX ORDER — EZETIMIBE 10 MG/1
10 TABLET ORAL DAILY
Qty: 90 TABLET | Refills: 0 | Status: SHIPPED | OUTPATIENT
Start: 2025-07-09

## 2025-07-09 RX ORDER — DULOXETIN HYDROCHLORIDE 60 MG/1
60 CAPSULE, DELAYED RELEASE ORAL DAILY
Qty: 90 CAPSULE | Refills: 0 | Status: SHIPPED | OUTPATIENT
Start: 2025-07-09

## 2025-07-22 DIAGNOSIS — I10 ESSENTIAL HYPERTENSION: ICD-10-CM

## 2025-07-22 RX ORDER — DOXAZOSIN 4 MG/1
4 TABLET ORAL NIGHTLY
Qty: 90 TABLET | Refills: 0 | Status: SHIPPED | OUTPATIENT
Start: 2025-07-22

## 2025-08-20 DIAGNOSIS — I10 ESSENTIAL HYPERTENSION: ICD-10-CM

## 2025-08-20 RX ORDER — DOXAZOSIN 4 MG/1
4 TABLET ORAL NIGHTLY
Qty: 90 TABLET | Refills: 0 | OUTPATIENT
Start: 2025-08-20

## 2025-08-21 ENCOUNTER — TELEPHONE (OUTPATIENT)
Dept: OBGYN CLINIC | Age: 80
End: 2025-08-21

## 2025-08-21 RX ORDER — FLUCONAZOLE 150 MG/1
TABLET ORAL
Qty: 3 TABLET | Refills: 0 | Status: SHIPPED | OUTPATIENT
Start: 2025-08-21

## 2025-09-04 ENCOUNTER — CLINICAL SUPPORT (OUTPATIENT)
Dept: OBGYN CLINIC | Age: 80
End: 2025-09-04

## 2025-09-04 DIAGNOSIS — M79.18 BUTTOCK PAIN: ICD-10-CM

## 2025-09-06 LAB
BACTERIA UR CULT: ABNORMAL
BACTERIA UR CULT: ABNORMAL
ORGANISM: ABNORMAL

## (undated) DEVICE — KIT ARMOR C DRP COLLAPSIBLE AND SELF EXP TOP CVR FOR FLUOROSCOPIC

## (undated) DEVICE — NEUROSTIMULATOR EXT SM LTWT SGL BTTN H2O RESIST WIRELESS

## (undated) DEVICE — LIQUIBAND RAPID ADHESIVE 36/CS 0.8ML: Brand: MEDLINE

## (undated) DEVICE — GOWN,AURORA,NONREINFORCED,LARGE: Brand: MEDLINE

## (undated) DEVICE — GLOVE SURG SZ 65 THK91MIL LTX FREE SYN POLYISOPRENE

## (undated) DEVICE — PROGRAMMER SMART COMM W/HANDSET F/SACRAL NRVE STIMULATORS

## (undated) DEVICE — FORCEPS BX L240CM JAW DIA2.8MM L CAP W/ NDL MIC MESH TOOTH

## (undated) DEVICE — NEPTUNE E-SEP SMOKE EVACUATION PENCIL, COATED, 70MM BLADE, PUSH BUTTON SWITCH: Brand: NEPTUNE E-SEP

## (undated) DEVICE — 3M™ STERI-STRIP™ REINFORCED ADHESIVE SKIN CLOSURES, R1547, 1/2 IN X 4 IN (12 MM X 100 MM), 6 STRIPS/ENVELOPE: Brand: 3M™ STERI-STRIP™

## (undated) DEVICE — SUTURE PROL SZ 0 L30IN NONABSORBABLE BLU MO-6 L26MM 1/2 CIR 8418H

## (undated) DEVICE — GENERAL MINOR: Brand: MEDLINE INDUSTRIES, INC.

## (undated) DEVICE — SUTURE VCRL SZ 4-0 L18IN ABSRB UD L19MM PS-2 3/8 CIR PRIM J496H

## (undated) DEVICE — PATIENT RETURN ELECTRODE, SINGLE-USE, NON CONTACT QUALITY MONITORING, ADULT, WITH 9 FT (2.7 M) CORD, FOR PATIENTS WEIGHING OVER 33LBS. (15KG): Brand: MEGADYNE

## (undated) DEVICE — SPONGE,NEURO,1"X1",XR,STRL,LF,10/PK: Brand: MEDLINE

## (undated) DEVICE — GOWN,SIRUS,POLYRNF,BRTHSLV,XLN/XL,20/CS: Brand: MEDLINE

## (undated) DEVICE — ELECTRODE PT RET AD L9FT HI MOIST COND ADH HYDRGEL CORDED

## (undated) DEVICE — KIT EVAC 400CC PVC RADPQ Y CONN

## (undated) DEVICE — C-ARM: Brand: UNBRANDED

## (undated) DEVICE — 1010 S-DRAPE TOWEL DRAPE 10/BX: Brand: STERI-DRAPE™

## (undated) DEVICE — GAUZE,SPONGE,2"X2",8PLY,STERILE,LF,2'S: Brand: MEDLINE

## (undated) DEVICE — GLOVE ORANGE PI 8 1/2   MSG9085

## (undated) DEVICE — COVER LT HNDL BLU PLAS

## (undated) DEVICE — TUBING, SUCTION, 1/4" X 10', STRAIGHT: Brand: MEDLINE

## (undated) DEVICE — TRAP POLYP BALEEN

## (undated) DEVICE — GAUZE,SPONGE,4"X4",12PLY,STERILE,LF,2'S: Brand: MEDLINE

## (undated) DEVICE — STIMULATOR NERVE 4.32 MM PERC EXTN KT INTERSTIM QUAD

## (undated) DEVICE — HYPODERMIC SAFETY NEEDLE: Brand: MAGELLAN

## (undated) DEVICE — DRESSING HYDROFIBER AQUACEL AG ADVANTAGE 3.5X6 IN

## (undated) DEVICE — SPONGE,LAP,4"X18",XR,ST,5/PK,40PK/CS: Brand: MEDLINE INDUSTRIES, INC.

## (undated) DEVICE — SINGLE PORT MANIFOLD: Brand: NEPTUNE 2

## (undated) DEVICE — STRIP,CLOSURE,WOUND,MEDI-STRIP,1/2X4: Brand: MEDLINE

## (undated) DEVICE — SWABSTICK MEDICATED 10% POVIDONE IOD PVP SGL ANTISEP SAT

## (undated) DEVICE — NEEDLE NERVE STIM FORAMEN 5 IN INTERSTIM

## (undated) DEVICE — SUTURE MONOCRYL SZ 4-0 L27IN ABSRB UD L19MM PS-2 1/2 CIR PRIM Y426H

## (undated) DEVICE — INSTINCT PLUS ENDOSCOPIC CLIPPING DEVICE: Brand: INSTINCT

## (undated) DEVICE — GLOVE ORANGE PI 8   MSG9080

## (undated) DEVICE — BRUSH ENDO CLN L90.5IN SHTH DIA1.7MM BRIST DIA5-7MM 2-6MM

## (undated) DEVICE — TOWEL,OR,DSP,ST,BLUE,STD,4/PK,20PK/CS: Brand: MEDLINE

## (undated) DEVICE — INTENDED TO BE USED TO OCCLUDE, RETRACT AND IDENTIFY ARTERIES, VEINS, TENDONS AND NERVES IN SURGICAL PROCEDURES: Brand: STERION®  VESSEL LOOP

## (undated) DEVICE — SYRINGE MED 10ML LUERLOCK TIP W/O SFTY DISP

## (undated) DEVICE — CARBIDE MATCH HEAD

## (undated) DEVICE — SUTURE VCRL + SZ 3-0 L27IN ABSRB UD L26MM SH 1/2 CIR VCP416H

## (undated) DEVICE — SUTURE PERMAHAND SZ 2-0 L12X18IN NONABSORBABLE BLK SILK A185H

## (undated) DEVICE — SUTURE VCRL SZ 3-0 L36IN ABSRB UD L36MM CT-1 1/2 CIR J944H

## (undated) DEVICE — DRAIN SURG L18IN DIA1IN PRECUT UNIF WALL THICKNESS PENROSE

## (undated) DEVICE — APPLICATOR MEDICATED 26 CC SOLUTION HI LT ORNG CHLORAPREP

## (undated) DEVICE — SYRINGE MED 10ML TRNSLUC BRL PLUNG BLK MRK POLYPR CTRL

## (undated) DEVICE — SUTURE PERMA-HAND SZ 2-0 L30IN NONABSORBABLE BLK L26MM SH K833H

## (undated) DEVICE — SUTURE VCRL SZ 3-0 L54IN ABSRB VLT LIGAPAK REEL NDL J205G

## (undated) DEVICE — 4-PORT MANIFOLD: Brand: NEPTUNE 2

## (undated) DEVICE — INTENDED FOR TISSUE SEPARATION, AND OTHER PROCEDURES THAT REQUIRE A SHARP SURGICAL BLADE TO PUNCTURE OR CUT.: Brand: BARD-PARKER ® CARBON RIB-BACK BLADES

## (undated) DEVICE — SUTURE VICRYL SZ 2-0 L36IN ABSRB UD L36MM CT-1 1/2 CIR J945H

## (undated) DEVICE — TUBE SET 96 MM 64 MM H2O PERISTALTIC STD AUX CHANNEL

## (undated) DEVICE — GAUZE,SPONGE,4"X4",16PLY,XRAY,STRL,LF: Brand: MEDLINE

## (undated) DEVICE — TRAY PREP DRY W/ PREM GLV 2 APPL 6 SPNG 2 UNDPD 1 OVERWRAP

## (undated) DEVICE — FLOSEAL WITH RECOTHROM - 10ML.: Brand: FLOSEAL HEMOSTATIC MATRIX

## (undated) DEVICE — LABEL MED MINI W/ MARKER

## (undated) DEVICE — PACK,LAPAROTOMY,NO GOWNS: Brand: MEDLINE

## (undated) DEVICE — YANKAUER,BULB TIP,W/O VENT,RIGID,STERILE: Brand: MEDLINE

## (undated) DEVICE — GLOVE SURG 5.5 PF POLYISOPRENE WHT STRL SENSICARE MIC

## (undated) DEVICE — SUTURE VCRL + SZ 0 L27IN ABSRB VLT L26MM UR-6 5/8 CIR VCP603H

## (undated) DEVICE — SNARE ENDOSCP L240CM SHTH DIA24MM LOOP W10MM POLYP RND REINF

## (undated) DEVICE — COUNTER NDL 40 COUNT HLD 70 FOAM BLK ADH W/ MAG

## (undated) DEVICE — Device: Brand: ENDO SMARTCAP

## (undated) DEVICE — NEURO PACK: Brand: MEDLINE INDUSTRIES, INC.

## (undated) DEVICE — TTL1LYR 16FR10ML 100%SIL TMPST TR: Brand: MEDLINE

## (undated) DEVICE — ELECTROSURGICAL PENCIL BUTTON SWITCH E-Z CLEAN COATED BLADE ELECTRODE 10 FT (3 M) CORD HOLSTER: Brand: MEGADYNE

## (undated) DEVICE — MARKER SURG SKIN GENTIAN VLT REG TIP W/ 6IN RUL

## (undated) DEVICE — SUTURE NRLN SZ 0 L18IN NONABSORBABLE BLK L36MM CT-1 1/2 CIR C521D

## (undated) DEVICE — BINDER ABD 2XL H12XL60 75IN UNISX STD E 4 PNL DISPOSABLE

## (undated) DEVICE — SYRINGE MED 30ML STD CLR PLAS LUERLOCK TIP N CTRL DISP

## (undated) DEVICE — DRAIN SURG 10FR 100% SIL RND END PERF W/ TRCR

## (undated) DEVICE — BLADE,CARBON-STEEL,11,STRL,DISPOSABLE,TB: Brand: MEDLINE

## (undated) DEVICE — SUTURE MCRYL SZ 4-0 L27IN ABSRB UD L19MM PS-2 1/2 CIR PRIM Y426H

## (undated) DEVICE — NEEDLE SPNL 18GA L3.5IN W/ QNCKE SHARPER BVL DURA CLICK

## (undated) DEVICE — SYRINGE IRRIG 60ML SFT PLIABLE BLB EZ TO GRP 1 HND USE W/

## (undated) DEVICE — SUTURE VCRL SZ 2-0 L18IN ABSRB UD L26MM CP-2 1/2 CIR REV J762D